# Patient Record
Sex: MALE | Race: WHITE | NOT HISPANIC OR LATINO | Employment: OTHER | URBAN - METROPOLITAN AREA
[De-identification: names, ages, dates, MRNs, and addresses within clinical notes are randomized per-mention and may not be internally consistent; named-entity substitution may affect disease eponyms.]

---

## 2020-05-20 ENCOUNTER — HOSPITAL ENCOUNTER (INPATIENT)
Facility: HOSPITAL | Age: 60
LOS: 9 days | Discharge: HOME | DRG: 280 | End: 2020-05-29
Attending: EMERGENCY MEDICINE | Admitting: INTERNAL MEDICINE
Payer: MEDICAID

## 2020-05-20 DIAGNOSIS — I50.9 CONGESTIVE HEART FAILURE, UNSPECIFIED HF CHRONICITY, UNSPECIFIED HEART FAILURE TYPE: ICD-10-CM

## 2020-05-20 DIAGNOSIS — I47.10 SVT (SUPRAVENTRICULAR TACHYCARDIA): ICD-10-CM

## 2020-05-20 DIAGNOSIS — F41.9 ANXIETY: ICD-10-CM

## 2020-05-20 DIAGNOSIS — I21.4 NSTEMI (NON-ST ELEVATED MYOCARDIAL INFARCTION): ICD-10-CM

## 2020-05-20 DIAGNOSIS — R00.0 TACHYCARDIA: ICD-10-CM

## 2020-05-20 DIAGNOSIS — R06.03 RESPIRATORY DISTRESS, ACUTE: Primary | ICD-10-CM

## 2020-05-20 DIAGNOSIS — I50.9 CHF (CONGESTIVE HEART FAILURE): ICD-10-CM

## 2020-05-20 PROBLEM — I50.41 ACUTE COMBINED SYSTOLIC AND DIASTOLIC CHF, NYHA CLASS 4: Status: ACTIVE | Noted: 2020-05-20

## 2020-05-20 PROBLEM — J44.9 COPD (CHRONIC OBSTRUCTIVE PULMONARY DISEASE): Status: ACTIVE | Noted: 2020-05-20

## 2020-05-20 PROBLEM — R79.89 ELEVATED TROPONIN: Status: ACTIVE | Noted: 2020-05-20

## 2020-05-20 PROBLEM — E87.20 LACTIC ACIDOSIS: Status: ACTIVE | Noted: 2020-05-20

## 2020-05-20 PROBLEM — R94.31 ABNORMAL ECG: Status: ACTIVE | Noted: 2020-05-20

## 2020-05-20 PROBLEM — I10 ESSENTIAL HYPERTENSION: Status: ACTIVE | Noted: 2020-05-20

## 2020-05-20 PROBLEM — J96.00 RESPIRATORY FAILURE, ACUTE: Status: ACTIVE | Noted: 2020-05-20

## 2020-05-20 LAB
ALBUMIN SERPL BCP-MCNC: 3.5 G/DL (ref 3.5–5.2)
ALLENS TEST: ABNORMAL
ALP SERPL-CCNC: 171 U/L (ref 55–135)
ALT SERPL W/O P-5'-P-CCNC: 10 U/L (ref 10–44)
ANION GAP SERPL CALC-SCNC: 18 MMOL/L (ref 8–16)
AORTIC ROOT ANNULUS: 3.6 CM
APTT BLDCRRT: 27.6 SEC (ref 21–32)
ASCENDING AORTA: 2.81 CM
AST SERPL-CCNC: 14 U/L (ref 10–40)
AV INDEX (PROSTH): 0.72
AV MEAN GRADIENT: 2 MMHG
AV PEAK GRADIENT: 3 MMHG
AV VALVE AREA: 2.41 CM2
AV VELOCITY RATIO: 0.67
B-OH-BUTYR BLD STRIP-SCNC: 0.5 MMOL/L (ref 0–0.5)
BASOPHILS # BLD AUTO: 0.02 K/UL (ref 0–0.2)
BASOPHILS # BLD AUTO: 0.15 K/UL (ref 0–0.2)
BASOPHILS NFR BLD: 0.1 % (ref 0–1.9)
BASOPHILS NFR BLD: 1.1 % (ref 0–1.9)
BILIRUB SERPL-MCNC: 0.4 MG/DL (ref 0.1–1)
BNP SERPL-MCNC: 1197 PG/ML (ref 0–99)
BSA FOR ECHO PROCEDURE: 2.09 M2
BUN SERPL-MCNC: 20 MG/DL (ref 8–23)
CALCIUM SERPL-MCNC: 9 MG/DL (ref 8.7–10.5)
CHLORIDE SERPL-SCNC: 107 MMOL/L (ref 95–110)
CK SERPL-CCNC: 35 U/L (ref 20–200)
CO2 SERPL-SCNC: 16 MMOL/L (ref 23–29)
CREAT SERPL-MCNC: 2.1 MG/DL (ref 0.5–1.4)
CRP SERPL-MCNC: 42.9 MG/L (ref 0–8.2)
CV ECHO LV RWT: 0.78 CM
DELSYS: ABNORMAL
DIFFERENTIAL METHOD: ABNORMAL
DIFFERENTIAL METHOD: ABNORMAL
DOP CALC AO PEAK VEL: 0.9 M/S
DOP CALC AO VTI: 14.9 CM
DOP CALC LVOT AREA: 3.3 CM2
DOP CALC LVOT DIAMETER: 2.06 CM
DOP CALC LVOT PEAK VEL: 0.6 M/S
DOP CALC LVOT STROKE VOLUME: 35.84 CM3
DOP CALCLVOT PEAK VEL VTI: 10.76 CM
E WAVE DECELERATION TIME: 146.42 MSEC
E/A RATIO: 3.35
E/E' RATIO: 15.82 M/S
ECHO LV POSTERIOR WALL: 1.74 CM (ref 0.6–1.1)
EOSINOPHIL # BLD AUTO: 0 K/UL (ref 0–0.5)
EOSINOPHIL # BLD AUTO: 0.3 K/UL (ref 0–0.5)
EOSINOPHIL NFR BLD: 0 % (ref 0–8)
EOSINOPHIL NFR BLD: 2.4 % (ref 0–8)
EP: 6
ERYTHROCYTE [DISTWIDTH] IN BLOOD BY AUTOMATED COUNT: 17.5 % (ref 11.5–14.5)
ERYTHROCYTE [DISTWIDTH] IN BLOOD BY AUTOMATED COUNT: 18.4 % (ref 11.5–14.5)
ERYTHROCYTE [SEDIMENTATION RATE] IN BLOOD BY WESTERGREN METHOD: 14 MM/H
EST. GFR  (AFRICAN AMERICAN): 38 ML/MIN/1.73 M^2
EST. GFR  (NON AFRICAN AMERICAN): 33 ML/MIN/1.73 M^2
FERRITIN SERPL-MCNC: 134 NG/ML (ref 20–300)
FIO2: 60
FRACTIONAL SHORTENING: 14 % (ref 28–44)
GLUCOSE SERPL-MCNC: 291 MG/DL (ref 70–110)
HCO3 UR-SCNC: 17.6 MMOL/L (ref 24–28)
HCT VFR BLD AUTO: 40.7 % (ref 40–54)
HCT VFR BLD AUTO: 46.7 % (ref 40–54)
HGB BLD-MCNC: 14.2 G/DL (ref 14–18)
HGB BLD-MCNC: 15.5 G/DL (ref 14–18)
IMM GRANULOCYTES # BLD AUTO: 0.16 K/UL (ref 0–0.04)
IMM GRANULOCYTES # BLD AUTO: 0.28 K/UL (ref 0–0.04)
IMM GRANULOCYTES NFR BLD AUTO: 1.2 % (ref 0–0.5)
IMM GRANULOCYTES NFR BLD AUTO: 2 % (ref 0–0.5)
INR PPP: 0.9 (ref 0.8–1.2)
INTERVENTRICULAR SEPTUM: 1.64 CM (ref 0.6–1.1)
IP: 12
IVRT: 51.38 MSEC
LA MAJOR: 3.42 CM
LA MINOR: 2.79 CM
LA WIDTH: 3.73 CM
LACTATE SERPL-SCNC: 2.3 MMOL/L (ref 0.5–2.2)
LACTATE SERPL-SCNC: 6.3 MMOL/L (ref 0.5–2.2)
LDH SERPL L TO P-CCNC: 285 U/L (ref 110–260)
LEFT ATRIUM SIZE: 3.46 CM
LEFT ATRIUM VOLUME INDEX: 16.4 ML/M2
LEFT ATRIUM VOLUME: 33.71 CM3
LEFT INTERNAL DIMENSION IN SYSTOLE: 3.85 CM (ref 2.1–4)
LEFT VENTRICLE DIASTOLIC VOLUME INDEX: 44.92 ML/M2
LEFT VENTRICLE DIASTOLIC VOLUME: 92.19 ML
LEFT VENTRICLE MASS INDEX: 161 G/M2
LEFT VENTRICLE SYSTOLIC VOLUME INDEX: 31.2 ML/M2
LEFT VENTRICLE SYSTOLIC VOLUME: 63.94 ML
LEFT VENTRICULAR INTERNAL DIMENSION IN DIASTOLE: 4.49 CM (ref 3.5–6)
LEFT VENTRICULAR MASS: 330.84 G
LV LATERAL E/E' RATIO: 12.43 M/S
LV SEPTAL E/E' RATIO: 21.75 M/S
LYMPHOCYTES # BLD AUTO: 1 K/UL (ref 1–4.8)
LYMPHOCYTES # BLD AUTO: 3.8 K/UL (ref 1–4.8)
LYMPHOCYTES NFR BLD: 27.1 % (ref 18–48)
LYMPHOCYTES NFR BLD: 7 % (ref 18–48)
MAGNESIUM SERPL-MCNC: 1.9 MG/DL (ref 1.6–2.6)
MCH RBC QN AUTO: 32.3 PG (ref 27–31)
MCH RBC QN AUTO: 32.5 PG (ref 27–31)
MCHC RBC AUTO-ENTMCNC: 33.2 G/DL (ref 32–36)
MCHC RBC AUTO-ENTMCNC: 34.9 G/DL (ref 32–36)
MCV RBC AUTO: 93 FL (ref 82–98)
MCV RBC AUTO: 97 FL (ref 82–98)
MODE: ABNORMAL
MONOCYTES # BLD AUTO: 0.3 K/UL (ref 0.3–1)
MONOCYTES # BLD AUTO: 1 K/UL (ref 0.3–1)
MONOCYTES NFR BLD: 2.2 % (ref 4–15)
MONOCYTES NFR BLD: 7 % (ref 4–15)
MV PEAK A VEL: 0.26 M/S
MV PEAK E VEL: 0.87 M/S
NEUTROPHILS # BLD AUTO: 12.4 K/UL (ref 1.8–7.7)
NEUTROPHILS # BLD AUTO: 8.4 K/UL (ref 1.8–7.7)
NEUTROPHILS NFR BLD: 60.4 % (ref 38–73)
NEUTROPHILS NFR BLD: 89.5 % (ref 38–73)
NRBC BLD-RTO: 0 /100 WBC
NRBC BLD-RTO: 0 /100 WBC
PCO2 BLDA: 46.7 MMHG (ref 35–45)
PH SMN: 7.18 [PH] (ref 7.35–7.45)
PHOSPHATE SERPL-MCNC: 4 MG/DL (ref 2.7–4.5)
PISA TR MAX VEL: 3.01 M/S
PLATELET # BLD AUTO: 182 K/UL (ref 150–350)
PLATELET # BLD AUTO: 228 K/UL (ref 150–350)
PMV BLD AUTO: 11.9 FL (ref 9.2–12.9)
PMV BLD AUTO: 12.9 FL (ref 9.2–12.9)
PO2 BLDA: 82 MMHG (ref 80–100)
POC BE: -11 MMOL/L
POC SATURATED O2: 93 % (ref 95–100)
POTASSIUM SERPL-SCNC: 4.4 MMOL/L (ref 3.5–5.1)
PROCALCITONIN SERPL IA-MCNC: 0.12 NG/ML
PROT SERPL-MCNC: 7.8 G/DL (ref 6–8.4)
PROTHROMBIN TIME: 9.8 SEC (ref 9–12.5)
RA MAJOR: 3.05 CM
RBC # BLD AUTO: 4.37 M/UL (ref 4.6–6.2)
RBC # BLD AUTO: 4.8 M/UL (ref 4.6–6.2)
SAMPLE: ABNORMAL
SARS-COV-2 RDRP RESP QL NAA+PROBE: NEGATIVE
SINUS: 3.29 CM
SITE: ABNORMAL
SODIUM SERPL-SCNC: 141 MMOL/L (ref 136–145)
STJ: 3 CM
TDI LATERAL: 0.07 M/S
TDI SEPTAL: 0.04 M/S
TDI: 0.06 M/S
TR MAX PG: 36 MMHG
TRICUSPID ANNULAR PLANE SYSTOLIC EXCURSION: 1.39 CM
TROPONIN I SERPL DL<=0.01 NG/ML-MCNC: 0.09 NG/ML (ref 0–0.03)
TROPONIN I SERPL DL<=0.01 NG/ML-MCNC: 1.18 NG/ML (ref 0–0.03)
TROPONIN I SERPL DL<=0.01 NG/ML-MCNC: 2.69 NG/ML (ref 0–0.03)
WBC # BLD AUTO: 13.8 K/UL (ref 3.9–12.7)
WBC # BLD AUTO: 13.83 K/UL (ref 3.9–12.7)

## 2020-05-20 PROCEDURE — 85025 COMPLETE CBC W/AUTO DIFF WBC: CPT

## 2020-05-20 PROCEDURE — 80053 COMPREHEN METABOLIC PANEL: CPT

## 2020-05-20 PROCEDURE — 85730 THROMBOPLASTIN TIME PARTIAL: CPT

## 2020-05-20 PROCEDURE — 83605 ASSAY OF LACTIC ACID: CPT

## 2020-05-20 PROCEDURE — 93005 ELECTROCARDIOGRAM TRACING: CPT

## 2020-05-20 PROCEDURE — 25000003 PHARM REV CODE 250: Performed by: EMERGENCY MEDICINE

## 2020-05-20 PROCEDURE — 99900035 HC TECH TIME PER 15 MIN (STAT)

## 2020-05-20 PROCEDURE — 63600175 PHARM REV CODE 636 W HCPCS: Performed by: EMERGENCY MEDICINE

## 2020-05-20 PROCEDURE — 96375 TX/PRO/DX INJ NEW DRUG ADDON: CPT

## 2020-05-20 PROCEDURE — 83615 LACTATE (LD) (LDH) ENZYME: CPT

## 2020-05-20 PROCEDURE — U0002 COVID-19 LAB TEST NON-CDC: HCPCS

## 2020-05-20 PROCEDURE — 99233 SBSQ HOSP IP/OBS HIGH 50: CPT | Mod: 25,,, | Performed by: INTERNAL MEDICINE

## 2020-05-20 PROCEDURE — 82550 ASSAY OF CK (CPK): CPT

## 2020-05-20 PROCEDURE — 27000190 HC CPAP FULL FACE MASK W/VALVE

## 2020-05-20 PROCEDURE — 83735 ASSAY OF MAGNESIUM: CPT

## 2020-05-20 PROCEDURE — 83880 ASSAY OF NATRIURETIC PEPTIDE: CPT

## 2020-05-20 PROCEDURE — 82728 ASSAY OF FERRITIN: CPT

## 2020-05-20 PROCEDURE — 84100 ASSAY OF PHOSPHORUS: CPT

## 2020-05-20 PROCEDURE — 94660 CPAP INITIATION&MGMT: CPT

## 2020-05-20 PROCEDURE — 86140 C-REACTIVE PROTEIN: CPT

## 2020-05-20 PROCEDURE — 63600175 PHARM REV CODE 636 W HCPCS: Performed by: INTERNAL MEDICINE

## 2020-05-20 PROCEDURE — 84484 ASSAY OF TROPONIN QUANT: CPT | Mod: 91

## 2020-05-20 PROCEDURE — 99233 PR SUBSEQUENT HOSPITAL CARE,LEVL III: ICD-10-PCS | Mod: 25,,, | Performed by: INTERNAL MEDICINE

## 2020-05-20 PROCEDURE — 85610 PROTHROMBIN TIME: CPT

## 2020-05-20 PROCEDURE — 36600 WITHDRAWAL OF ARTERIAL BLOOD: CPT

## 2020-05-20 PROCEDURE — 99291 CRITICAL CARE FIRST HOUR: CPT | Mod: 25

## 2020-05-20 PROCEDURE — 82803 BLOOD GASES ANY COMBINATION: CPT

## 2020-05-20 PROCEDURE — 84145 PROCALCITONIN (PCT): CPT

## 2020-05-20 PROCEDURE — 93010 EKG 12-LEAD: ICD-10-PCS | Mod: ,,, | Performed by: INTERNAL MEDICINE

## 2020-05-20 PROCEDURE — 93010 ELECTROCARDIOGRAM REPORT: CPT | Mod: ,,, | Performed by: INTERNAL MEDICINE

## 2020-05-20 PROCEDURE — 87040 BLOOD CULTURE FOR BACTERIA: CPT

## 2020-05-20 PROCEDURE — 84484 ASSAY OF TROPONIN QUANT: CPT

## 2020-05-20 PROCEDURE — 96365 THER/PROPH/DIAG IV INF INIT: CPT

## 2020-05-20 PROCEDURE — 82010 KETONE BODYS QUAN: CPT

## 2020-05-20 PROCEDURE — 83605 ASSAY OF LACTIC ACID: CPT | Mod: 91

## 2020-05-20 PROCEDURE — 11000001 HC ACUTE MED/SURG PRIVATE ROOM

## 2020-05-20 RX ORDER — ACETAMINOPHEN 325 MG/1
650 TABLET ORAL EVERY 4 HOURS PRN
Status: DISCONTINUED | OUTPATIENT
Start: 2020-05-20 | End: 2020-05-29 | Stop reason: HOSPADM

## 2020-05-20 RX ORDER — ASPIRIN 325 MG
325 TABLET ORAL
Status: COMPLETED | OUTPATIENT
Start: 2020-05-20 | End: 2020-05-20

## 2020-05-20 RX ORDER — LABETALOL HYDROCHLORIDE 5 MG/ML
20 INJECTION, SOLUTION INTRAVENOUS
Status: COMPLETED | OUTPATIENT
Start: 2020-05-20 | End: 2020-05-20

## 2020-05-20 RX ORDER — ADENOSINE 3 MG/ML
6 INJECTION INTRAVENOUS
Status: DISCONTINUED | OUTPATIENT
Start: 2020-05-20 | End: 2020-05-20

## 2020-05-20 RX ORDER — METHYLPREDNISOLONE SOD SUCC 125 MG
125 VIAL (EA) INJECTION
Status: COMPLETED | OUTPATIENT
Start: 2020-05-20 | End: 2020-05-20

## 2020-05-20 RX ORDER — FUROSEMIDE 10 MG/ML
40 INJECTION INTRAMUSCULAR; INTRAVENOUS
Status: DISCONTINUED | OUTPATIENT
Start: 2020-05-20 | End: 2020-05-23

## 2020-05-20 RX ORDER — SODIUM CHLORIDE 0.9 % (FLUSH) 0.9 %
10 SYRINGE (ML) INJECTION
Status: DISCONTINUED | OUTPATIENT
Start: 2020-05-20 | End: 2020-05-29 | Stop reason: HOSPADM

## 2020-05-20 RX ORDER — HEPARIN SODIUM,PORCINE/D5W 25000/250
12 INTRAVENOUS SOLUTION INTRAVENOUS CONTINUOUS
Status: DISCONTINUED | OUTPATIENT
Start: 2020-05-20 | End: 2020-05-25

## 2020-05-20 RX ORDER — ADENOSINE 3 MG/ML
INJECTION, SOLUTION INTRAVENOUS
Status: DISPENSED
Start: 2020-05-20 | End: 2020-05-20

## 2020-05-20 RX ORDER — ADENOSINE 3 MG/ML
6 INJECTION INTRAVENOUS
Status: COMPLETED | OUTPATIENT
Start: 2020-05-20 | End: 2020-05-20

## 2020-05-20 RX ORDER — METOPROLOL TARTRATE 1 MG/ML
5 INJECTION, SOLUTION INTRAVENOUS
Status: COMPLETED | OUTPATIENT
Start: 2020-05-20 | End: 2020-05-20

## 2020-05-20 RX ORDER — FUROSEMIDE 10 MG/ML
40 INJECTION INTRAMUSCULAR; INTRAVENOUS
Status: COMPLETED | OUTPATIENT
Start: 2020-05-20 | End: 2020-05-20

## 2020-05-20 RX ORDER — IPRATROPIUM BROMIDE AND ALBUTEROL SULFATE 2.5; .5 MG/3ML; MG/3ML
3 SOLUTION RESPIRATORY (INHALATION) EVERY 6 HOURS PRN
Status: DISCONTINUED | OUTPATIENT
Start: 2020-05-21 | End: 2020-05-21

## 2020-05-20 RX ORDER — ADENOSINE 3 MG/ML
12 INJECTION, SOLUTION INTRAVENOUS
Status: COMPLETED | OUTPATIENT
Start: 2020-05-20 | End: 2020-05-20

## 2020-05-20 RX ORDER — IPRATROPIUM BROMIDE AND ALBUTEROL SULFATE 2.5; .5 MG/3ML; MG/3ML
3 SOLUTION RESPIRATORY (INHALATION)
Status: DISCONTINUED | OUTPATIENT
Start: 2020-05-20 | End: 2020-05-20

## 2020-05-20 RX ADMIN — NITROGLYCERIN 1 INCH: 20 OINTMENT TOPICAL at 09:05

## 2020-05-20 RX ADMIN — ADENOSINE 12 MG: 3 INJECTION, SOLUTION INTRAVENOUS at 08:05

## 2020-05-20 RX ADMIN — ASPIRIN 325 MG: 325 TABLET, FILM COATED ORAL at 09:05

## 2020-05-20 RX ADMIN — HEPARIN SODIUM 12 UNITS/KG/HR: 10000 INJECTION, SOLUTION INTRAVENOUS at 10:05

## 2020-05-20 RX ADMIN — LABETALOL HYDROCHLORIDE 20 MG: 5 INJECTION, SOLUTION INTRAVENOUS at 10:05

## 2020-05-20 RX ADMIN — FUROSEMIDE 40 MG: 10 INJECTION, SOLUTION INTRAMUSCULAR; INTRAVENOUS at 10:05

## 2020-05-20 RX ADMIN — ADENOSINE 6 MG: 3 INJECTION INTRAVENOUS at 08:05

## 2020-05-20 RX ADMIN — FUROSEMIDE 40 MG: 10 INJECTION, SOLUTION INTRAMUSCULAR; INTRAVENOUS at 09:05

## 2020-05-20 RX ADMIN — METOPROLOL TARTRATE 5 MG: 5 INJECTION INTRAVENOUS at 08:05

## 2020-05-20 RX ADMIN — CEFTRIAXONE 1 G: 1 INJECTION, SOLUTION INTRAVENOUS at 09:05

## 2020-05-20 RX ADMIN — METHYLPREDNISOLONE SODIUM SUCCINATE 125 MG: 125 INJECTION, POWDER, FOR SOLUTION INTRAMUSCULAR; INTRAVENOUS at 08:05

## 2020-05-20 NOTE — PROGRESS NOTES
Rt CALLED BACK TO RM. PT STATED HE COULDN'T BREATHE.  POST BEING SAT UP IN SEMI MARTÍNEZ POSITION.. Rt LAID PT BACK AND CHANGED PT BACK TO 12/6 AND INCREASED FIO2 60% SATS WERE 88% NOW 92%.

## 2020-05-20 NOTE — Clinical Note
Catheter is inserted into the ostium   right coronary artery. Angiography performed of the right coronary arteries in multiple views.All catheter exchanges occurs over the wire.

## 2020-05-20 NOTE — Clinical Note
Prepped: left radial and left brachial. Prepped with: ChloraPrep. The site was clipped. The patient was draped.

## 2020-05-20 NOTE — ED NOTES
The patient is resting quietly, eyes closed, arouses easily to stimuli. Airway is open and patent, respirations are spontaneous, normal respiratory effort and rate noted with bipap in place, skin warm and dry, appearance: in no acute distress and resting comfortably.

## 2020-05-20 NOTE — ED NOTES
Patient's sister, Lilian Hernandez ([phone]), called to offer information on the patient and would like an update. Primary nurse notified.

## 2020-05-20 NOTE — H&P (VIEW-ONLY)
[facility] -   Cardiology  Consult Note    Patient Name: Ceasar Hernandez  MRN: [id number]  Admission Date: 5/20/2020  Hospital Length of Stay: 0 days  Code Status: No Order   Attending Provider: No att. providers found   Consulting Provider: Rom Hall MD  Primary Care Physician: Provider Not[employer]ystem  Principal Problem:<principal problem not specified>    Patient information was obtained from past medical records and ER records.     Inpatient consult to Cardiology  Consult performed by: Rom Hall MD  Consult ordered by: Davis Lugo MD  Reason for consult: CHF        Subjective:     Chief Complaint:  DYSPNEA     HPI:   Pt presents with acute respiratory failure.  Per ER chart, suspicious for Covid 19.  Cardiology consult performed based on communication from ER physician and chart review.  He has h/o COPD.  Pt presented to ER with c/o dyspnea since yesterday with associated wheezing.  Per chart no cp sxs.  ECG in ER showed possible SVT.  Adenosine administered and f/u ecgs looked more like sinus tachycardia.  BP very high on arrival 217/109.  Given Labetalol in ER.    BNP 1194  Troponin 0.09  Lactic acid +  ecg with inferolateral st-t abnl.  No old records to compare/review.    Past Medical History:   Diagnosis Date    COPD (chronic obstructive pulmonary disease)        Past Surgical History:   Procedure Laterality Date    FRACTURE SURGERY         Review of patient's allergies indicates:  No Known Allergies    No current facility-administered medications on file prior to encounter.      No current outpatient medications on file prior to encounter.     Family History     None        Tobacco Use    Smoking status: Former Smoker   Substance and Sexual Activity    Alcohol use: Not Currently    Drug use: Never    Sexual activity: Not on file     Review of Systems   Constitution: Negative.   HENT: Negative.    Eyes: Negative.    Cardiovascular: Positive for dyspnea on exertion.    Respiratory: Positive for shortness of breath and wheezing.    Endocrine: Negative.    Hematologic/Lymphatic: Negative.    Skin: Negative.    Musculoskeletal: Negative.    Gastrointestinal: Negative.    Genitourinary: Negative.    Neurological: Negative.    Psychiatric/Behavioral: Negative.    Allergic/Immunologic: Negative.      Objective:     Vital Signs (Most Recent):  Temp: 98.7 °F (37.1 °C) (05/20/20 1502)  Pulse: 97 (05/20/20 1531)  Resp: (!) 24 (05/20/20 1531)  BP: (!) 139/95 (05/20/20 1531)  SpO2: 97 % (05/20/20 1531) Vital Signs (24h Range):  Temp:  [98.5 °F (36.9 °C)-98.7 °F (37.1 °C)] 98.7 °F (37.1 °C)  Pulse:  [] 97  Resp:  [21-44] 24  SpO2:  [90 %-98 %] 97 %  BP: (126-217)/() 139/95     Weight: 89.4 kg (197 lb)  Body mass index is 29.09 kg/m².    SpO2: 97 %  O2 Device (Oxygen Therapy): BiPAP      Intake/Output Summary (Last 24 hours) at 5/20/2020 1550  Last data filed at 5/20/2020 0944  Gross per 24 hour   Intake 50 ml   Output --   Net 50 ml       Lines/Drains/Airways     Peripheral Intravenous Line                 Peripheral IV - Single Lumen 05/20/20 0810 20 G Left Antecubital less than 1 day                Physical Exam     Not examined due to possible Covid 19 infection.      Significant Labs:   BMP:   Recent Labs   Lab 05/20/20  0811   *      K 4.4      CO2 16*   BUN 20   CREATININE 2.1*   CALCIUM 9.0   , CMP   Recent Labs   Lab 05/20/20  0811      K 4.4      CO2 16*   *   BUN 20   CREATININE 2.1*   CALCIUM 9.0   PROT 7.8   ALBUMIN 3.5   BILITOT 0.4   ALKPHOS 171*   AST 14   ALT 10   ANIONGAP 18*   ESTGFRAFRICA 38*   EGFRNONAA 33*   , CBC   Recent Labs   Lab 05/20/20  0811   WBC 13.83*   HGB 15.5   HCT 46.7      , INR No results for input(s): INR, PROTIME in the last 48 hours. and Troponin   Recent Labs   Lab 05/20/20  0811   TROPONINI 0.092*       Significant Imaging: Echocardiogram:   Transthoracic echo (TTE) complete (Cupid Only):    Results for orders placed or performed during the hospital encounter of 05/20/20   Echo Color Flow Doppler? Yes   Result Value Ref Range    Ascending aorta 2.81 cm    STJ 3.00 cm    AV mean gradient 2 mmHg    Ao peak papi 0.90 m/s    Ao VTI 14.90 cm    IVRT 51.38 msec    IVS 1.64 (A) 0.6 - 1.1 cm    LA size 3.46 cm    Left Atrium Major Axis 3.42 cm    Left Atrium Minor Axis 2.79 cm    LVIDD 4.49 3.5 - 6.0 cm    LVIDS 3.85 2.1 - 4.0 cm    LVOT diameter 2.06 cm    LVOT peak VTI 10.76 cm    PW 1.74 (A) 0.6 - 1.1 cm    MV Peak A Papi 0.26 m/s    E wave decelartion time 146.42 msec    MV Peak E Papi 0.87 m/s    RA Major Axis 3.05 cm    Sinus 3.29 cm    TAPSE 1.39 cm    TR Max Papi 3.01 m/s    TDI LATERAL 0.07 m/s    TDI SEPTAL 0.04 m/s    LA WIDTH 3.73 cm    Ao root annulus 3.60 cm    LV Diastolic Volume 92.19 mL    LV Systolic Volume 63.94 mL    LVOT peak papi 0.60 m/s    LV LATERAL E/E' RATIO 12.43 m/s    LV SEPTAL E/E' RATIO 21.75 m/s    FS 14 %    LA volume 33.71 cm3    LV mass 330.84 g    Left Ventricle Relative Wall Thickness 0.78 cm    AV valve area 2.41 cm2    AV Velocity Ratio 0.67     AV index (prosthetic) 0.72     E/A ratio 3.35     Mean e' 0.06 m/s    LVOT area 3.3 cm2    LVOT stroke volume 35.84 cm3    AV peak gradient 3 mmHg    E/E' ratio 15.82 m/s    LV Systolic Volume Index 31.2 mL/m2    LV Diastolic Volume Index 44.92 mL/m2    LA Volume Index 16.4 mL/m2    LV Mass Index 161 g/m2    Triscuspid Valve Regurgitation Peak Gradient 36 mmHg    BSA 2.09 m2    Narrative    · Severely decreased left ventricular systolic function. The estimated   ejection fraction is 20%.  · Grade III (severe) left ventricular diastolic dysfunction consistent   with restrictive physiology.  · Normal right ventricular systolic function.  · Concentric left ventricular hypertrophy.        Assessment and Plan:     PT HAS ACUTE CHF EXACERBATION WITH ELEVATED BNP AND ECHOCARDIOGRAM TODAY SHOWS LVEF 20%, DIASTOLIC CHF.  NORMAL RV  FUNCTION.  ELEVATED TROPONIN COULD BE SUPPLY/DEMAND ISCHEMIA WITH CHF, POSSIBLE SEPSIS OR ? COVID 19.  CANNOT RULE OUT UNDERLYING A FLUTTER ON INITIAL ECG BUT MOST RECENT APPEARS MORE SINUS TACHYCARDIA.  NEEDS ADMISSION TO ICU.  SERIAL CARDIAC ENZYMES.  RECOMMEND STARTING IV HEPARIN GTT (IF NO CONTRAINDICATIONS) FOR TIME BEING UNTIL TROPONIN LEVELS FURTHER DELINEATED.  IV LASIX 40 MG BID.  HTN CONTROL.  RECHECK LACTIC ACID.  TREAT FOR POSSIBLE SEPSIS.  POSSIBLE COVID ?  CONSIDER LOW DOSE DOBUTAMINE FOR INOTROPE SUPPORT IF FAILS TO IMPROVE BUT USE CAUTION WITH ELEVATED HR AND HTN ISSUES.  WILL FOLLOW.        Lactic acidosis  See management plan detailed above.     COPD (chronic obstructive pulmonary disease)  Per hospital medicine, critical care mgt.    Essential hypertension  HTN control.    Elevated troponin  See management plan detailed above.     Abnormal ECG  See management plan detailed above.       Acute combined systolic and diastolic CHF, NYHA class 4  See management plan detailed above.     Respiratory failure, acute  Per hospital medicine, critical care mgt.      VTE Risk Mitigation (From admission, onward)    None          Thank you for your consult.      Rom Hall MD  Cardiology   [facility] -

## 2020-05-20 NOTE — PROGRESS NOTES
RT called to ER . PT sob. Arrived via ambulance. PT on NRB at 15 lpm.  Ordered 3 nebs. .  pt. In SVT ok for RT to hold per physician,. BIPAP placed on PT 12/6 and 60 % . Post 5mins AGS drawn Results shown to Dr. Lugo.  RT than decreased fio2 50% and I- 10 and rate to 12.  RT educated pt on Indication and goals on all the above.

## 2020-05-20 NOTE — ED NOTES
"Pt lying in bed with bipap in place. AAO x 4. Skin warm and dry. Pt still slightly tachypneic but states "I feel a little better". Denies any needs or assist at this time.     "

## 2020-05-20 NOTE — Clinical Note
The catheter is removed from the  distal suprarenal abdominal aorta. All catheter exchanges occurs over the wire. .

## 2020-05-20 NOTE — CONSULTS
[facility] -   Cardiology  Consult Note    Patient Name: Ceasar Hernandez  MRN: [id number]  Admission Date: 5/20/2020  Hospital Length of Stay: 0 days  Code Status: No Order   Attending Provider: No att. providers found   Consulting Provider: Rom Hall MD  Primary Care Physician: Provider [employer]  Principal Problem:<principal problem not specified>    Patient information was obtained from past medical records and ER records.     Inpatient consult to Cardiology  Consult performed by: Rom Hall MD  Consult ordered by: Davis Lugo MD  Reason for consult: CHF        Subjective:     Chief Complaint:  DYSPNEA     HPI:   Pt presents with acute respiratory failure.  Per ER chart, suspicious for Covid 19.  Cardiology consult performed based on communication from ER physician and chart review.  He has h/o COPD.  Pt presented to ER with c/o dyspnea since yesterday with associated wheezing.  Per chart no cp sxs.  ECG in ER showed possible SVT.  Adenosine administered and f/u ecgs looked more like sinus tachycardia.  BP very high on arrival 217/109.  Given Labetalol in ER.    BNP 1194  Troponin 0.09  Lactic acid +  ecg with inferolateral st-t abnl.  No old records to compare/review.    Past Medical History:   Diagnosis Date    COPD (chronic obstructive pulmonary disease)        Past Surgical History:   Procedure Laterality Date    FRACTURE SURGERY         Review of patient's allergies indicates:  No Known Allergies    No current facility-administered medications on file prior to encounter.      No current outpatient medications on file prior to encounter.     Family History     None        Tobacco Use    Smoking status: Former Smoker   Substance and Sexual Activity    Alcohol use: Not Currently    Drug use: Never    Sexual activity: Not on file     Review of Systems   Constitution: Negative.   HENT: Negative.    Eyes: Negative.    Cardiovascular: Positive for dyspnea on exertion.    Respiratory: Positive for shortness of breath and wheezing.    Endocrine: Negative.    Hematologic/Lymphatic: Negative.    Skin: Negative.    Musculoskeletal: Negative.    Gastrointestinal: Negative.    Genitourinary: Negative.    Neurological: Negative.    Psychiatric/Behavioral: Negative.    Allergic/Immunologic: Negative.      Objective:     Vital Signs (Most Recent):  Temp: 98.7 °F (37.1 °C) (05/20/20 1502)  Pulse: 97 (05/20/20 1531)  Resp: (!) 24 (05/20/20 1531)  BP: (!) 139/95 (05/20/20 1531)  SpO2: 97 % (05/20/20 1531) Vital Signs (24h Range):  Temp:  [98.5 °F (36.9 °C)-98.7 °F (37.1 °C)] 98.7 °F (37.1 °C)  Pulse:  [] 97  Resp:  [21-44] 24  SpO2:  [90 %-98 %] 97 %  BP: (126-217)/() 139/95     Weight: 89.4 kg (197 lb)  Body mass index is 29.09 kg/m².    SpO2: 97 %  O2 Device (Oxygen Therapy): BiPAP      Intake/Output Summary (Last 24 hours) at 5/20/2020 1550  Last data filed at 5/20/2020 0944  Gross per 24 hour   Intake 50 ml   Output --   Net 50 ml       Lines/Drains/Airways     Peripheral Intravenous Line                 Peripheral IV - Single Lumen 05/20/20 0810 20 G Left Antecubital less than 1 day                Physical Exam     Not examined due to possible Covid 19 infection.      Significant Labs:   BMP:   Recent Labs   Lab 05/20/20  0811   *      K 4.4      CO2 16*   BUN 20   CREATININE 2.1*   CALCIUM 9.0   , CMP   Recent Labs   Lab 05/20/20  0811      K 4.4      CO2 16*   *   BUN 20   CREATININE 2.1*   CALCIUM 9.0   PROT 7.8   ALBUMIN 3.5   BILITOT 0.4   ALKPHOS 171*   AST 14   ALT 10   ANIONGAP 18*   ESTGFRAFRICA 38*   EGFRNONAA 33*   , CBC   Recent Labs   Lab 05/20/20  0811   WBC 13.83*   HGB 15.5   HCT 46.7      , INR No results for input(s): INR, PROTIME in the last 48 hours. and Troponin   Recent Labs   Lab 05/20/20  0811   TROPONINI 0.092*       Significant Imaging: Echocardiogram:   Transthoracic echo (TTE) complete (Cupid Only):    Results for orders placed or performed during the hospital encounter of 05/20/20   Echo Color Flow Doppler? Yes   Result Value Ref Range    Ascending aorta 2.81 cm    STJ 3.00 cm    AV mean gradient 2 mmHg    Ao peak papi 0.90 m/s    Ao VTI 14.90 cm    IVRT 51.38 msec    IVS 1.64 (A) 0.6 - 1.1 cm    LA size 3.46 cm    Left Atrium Major Axis 3.42 cm    Left Atrium Minor Axis 2.79 cm    LVIDD 4.49 3.5 - 6.0 cm    LVIDS 3.85 2.1 - 4.0 cm    LVOT diameter 2.06 cm    LVOT peak VTI 10.76 cm    PW 1.74 (A) 0.6 - 1.1 cm    MV Peak A Papi 0.26 m/s    E wave decelartion time 146.42 msec    MV Peak E Papi 0.87 m/s    RA Major Axis 3.05 cm    Sinus 3.29 cm    TAPSE 1.39 cm    TR Max Papi 3.01 m/s    TDI LATERAL 0.07 m/s    TDI SEPTAL 0.04 m/s    LA WIDTH 3.73 cm    Ao root annulus 3.60 cm    LV Diastolic Volume 92.19 mL    LV Systolic Volume 63.94 mL    LVOT peak papi 0.60 m/s    LV LATERAL E/E' RATIO 12.43 m/s    LV SEPTAL E/E' RATIO 21.75 m/s    FS 14 %    LA volume 33.71 cm3    LV mass 330.84 g    Left Ventricle Relative Wall Thickness 0.78 cm    AV valve area 2.41 cm2    AV Velocity Ratio 0.67     AV index (prosthetic) 0.72     E/A ratio 3.35     Mean e' 0.06 m/s    LVOT area 3.3 cm2    LVOT stroke volume 35.84 cm3    AV peak gradient 3 mmHg    E/E' ratio 15.82 m/s    LV Systolic Volume Index 31.2 mL/m2    LV Diastolic Volume Index 44.92 mL/m2    LA Volume Index 16.4 mL/m2    LV Mass Index 161 g/m2    Triscuspid Valve Regurgitation Peak Gradient 36 mmHg    BSA 2.09 m2    Narrative    · Severely decreased left ventricular systolic function. The estimated   ejection fraction is 20%.  · Grade III (severe) left ventricular diastolic dysfunction consistent   with restrictive physiology.  · Normal right ventricular systolic function.  · Concentric left ventricular hypertrophy.        Assessment and Plan:     PT HAS ACUTE CHF EXACERBATION WITH ELEVATED BNP AND ECHOCARDIOGRAM TODAY SHOWS LVEF 20%, DIASTOLIC CHF.  NORMAL RV  FUNCTION.  ELEVATED TROPONIN COULD BE SUPPLY/DEMAND ISCHEMIA WITH CHF, POSSIBLE SEPSIS OR ? COVID 19.  CANNOT RULE OUT UNDERLYING A FLUTTER ON INITIAL ECG BUT MOST RECENT APPEARS MORE SINUS TACHYCARDIA.  NEEDS ADMISSION TO ICU.  SERIAL CARDIAC ENZYMES.  RECOMMEND STARTING IV HEPARIN GTT (IF NO CONTRAINDICATIONS) FOR TIME BEING UNTIL TROPONIN LEVELS FURTHER DELINEATED.  IV LASIX 40 MG BID.  HTN CONTROL.  RECHECK LACTIC ACID.  TREAT FOR POSSIBLE SEPSIS.  POSSIBLE COVID ?  CONSIDER LOW DOSE DOBUTAMINE FOR INOTROPE SUPPORT IF FAILS TO IMPROVE BUT USE CAUTION WITH ELEVATED HR AND HTN ISSUES.  WILL FOLLOW.        Lactic acidosis  See management plan detailed above.     COPD (chronic obstructive pulmonary disease)  Per hospital medicine, critical care mgt.    Essential hypertension  HTN control.    Elevated troponin  See management plan detailed above.     Abnormal ECG  See management plan detailed above.       Acute combined systolic and diastolic CHF, NYHA class 4  See management plan detailed above.     Respiratory failure, acute  Per hospital medicine, critical care mgt.      VTE Risk Mitigation (From admission, onward)    None          Thank you for your consult.      Rom Hall MD  Cardiology   [facility] -

## 2020-05-20 NOTE — Clinical Note
The PA catheter is repositioned to the main pulmonary artery. Hemodynamics performed. Cardiac output obtained.

## 2020-05-20 NOTE — Clinical Note
115 ml injected throughout the case. 35 mL total wasted during the case. 150 mL total used in the case.

## 2020-05-20 NOTE — ED NOTES
Dr Lugo at bedside with Susie RN and Sharron RN to push adenosine. Pt connected to EKG to run a continuous rhythm during administration

## 2020-05-20 NOTE — ED NOTES
The patient is resting quietly, eyes closed, arouses easily to stimuli. Airway is open and patent, respirations are spontaneous with bipap in place, skin warm and dry, appearance: in no acute distress and resting comfortably.

## 2020-05-20 NOTE — ED NOTES
Pt lying in bed comfortably with bipap in place. AAO x 4, pt states he feels much better at this time.  Skin warm and dry. Resp even and unlabored with equal chest rise and fall. Denies any needs or assist at this time.

## 2020-05-20 NOTE — ED PROVIDER NOTES
SCRIBE #1 NOTE: I, Natalya Aguirer, am scribing for, and in the presence of, Davis Lugo MD. I have scribed the entire note.       History     Chief Complaint   Patient presents with    Shortness of Breath     pt presents to ED with shortness of breath      Review of patient's allergies indicates:  No Known Allergies      History of Present Illness     HPI    5/20/2020, 8:11 AM  History obtained from [facility] and patient      History of Present Illness: Ceasar Hernandez is a 60 y.o. male patient with a h/o COPD who presents to the Emergency Department for evaluation of SOB which onset yesterday. Symptoms are constant and moderate in severity. No mitigating or exacerbating factors reported. Associated sxs include wheezing. Patient denies any CP, fever, n/v, chills, and all other sxs at this time. No prior Tx reported. No further complaints or concerns at this time.       Arrival mode: [facility]    PCP: [facility]      Past Medical History:  Past Medical History:   Diagnosis Date    COPD (chronic obstructive pulmonary disease)     Gout     Hypertension        Past Surgical History:  Past Surgical History:   Procedure Laterality Date    FRACTURE SURGERY           Family History:  History reviewed. No pertinent family history.    Social History:   Social History     Tobacco Use    Smoking status: Former Smoker   Substance and Sexual Activity    Alcohol use: Not Currently    Drug use: Never    Sexual activity: Unknown        Review of Systems     Review of Systems   Constitutional: Negative for chills and fever.   HENT: Negative for sore throat.    Respiratory: Positive for shortness of breath and wheezing.    Cardiovascular: Negative for chest pain.   Gastrointestinal: Negative for nausea and vomiting.   Genitourinary: Negative for dysuria.   Musculoskeletal: Negative for back pain.   Skin: Negative for rash.   Neurological: Negative for headaches.   Hematological: Does not bruise/bleed easily.    All other systems reviewed and are negative.       Physical Exam     Initial Vitals   BP Pulse Resp Temp SpO2   05/20/20 0807 05/20/20 0805 05/20/20 0807 05/20/20 0831 05/20/20 0807   (!) 217/109 (!) 144 (!) 31 98.5 °F (36.9 °C) 95 %      MAP       --                 Physical Exam  Nursing Notes and Vital Signs Reviewed.  Constitutional: Well-developed and well-nourished.   Head: Atraumatic. Normocephalic.  Eyes: EOM intact. No scleral icterus.  ENT: Mucous membranes are moist. Oropharynx is clear and symmetric.    Neck: Supple. Full ROM. No lymphadenopathy.  Cardiovascular: Tachycardic. Regular rhythm. No murmurs, rubs, or gallops. Distal pulses are 2+ and symmetric.  Pulmonary/Chest: Mild respiratory distress. Tachypneic. Diminished breath sounds bilaterally. Wheezing. No rales.  Abdominal: Soft and non-distended.  There is no tenderness.  No rebound, guarding, or rigidity. Good bowel sounds.  Genitourinary: No CVA tenderness  Musculoskeletal: Moves all extremities. No obvious deformities. No calf tenderness.  Skin: Warm and dry.  Neurological:  Alert, awake, and appropriate.  Normal speech.  No acute focal neurological deficits are appreciated.  Psychiatric: Normal affect. Good eye contact. Appropriate in content.     ED Course   Critical Care  Date/Time: 5/20/2020 10:13 AM  Performed by: Davis Lugo MD  Authorized by: Davis Lugo MD   Direct patient critical care time: 17 minutes  Additional history critical care time: 4 minutes  Ordering / reviewing critical care time: 14 minutes  Documentation critical care time: 15 minutes  Total critical care time (exclusive of procedural time) : 50 minutes  Critical care time was exclusive of separately billable procedures and treating other patients and teaching time.  Critical care was necessary to treat or prevent imminent or life-threatening deterioration of the following conditions: respiratory failure.  Critical care was time spent  personally by me on the following activities: blood draw for specimens, development of treatment plan with patient or surrogate, discussions with consultants, interpretation of cardiac output measurements, evaluation of patient's response to treatment, examination of patient, obtaining history from patient or surrogate, ordering and performing treatments and interventions, ordering and review of laboratory studies, ordering and review of radiographic studies, pulse oximetry, re-evaluation of patient's condition and review of old charts.        ED Vital Signs:  Vitals:    05/21/20 0535 05/21/20 0550 05/21/20 0605 05/21/20 0620   BP: (!) 147/112 (!) 157/93 121/65 (!) 167/109   Pulse: 105 109 (!) 112 110   Resp: 20 19 (!) 23 (!) 22   Temp:       TempSrc:       SpO2: 99% 95% 96% 96%   Weight:       Height:        05/21/20 0635 05/21/20 0650 05/21/20 0700 05/21/20 0730   BP: (!) 168/111 (!) 161/110 (!) 174/96    Pulse: 110 109 109    Resp: 17 16 17    Temp:    98 °F (36.7 °C)   TempSrc:    Skin   SpO2: 95% 96% 95%    Weight:       Height:        05/21/20 0800 05/21/20 0828 05/21/20 0900 05/21/20 1000   BP: (!) 156/100  (!) 160/97 (!) 153/91   Pulse: 110 (!) 111 (!) 111 109   Resp: (!) 25 19 (!) 22 (!) 23   Temp:       TempSrc:       SpO2: 96% 97% 95% 96%   Weight:       Height:        05/21/20 1100 05/21/20 1200 05/21/20 1300   BP: (!) 159/92 (!) 145/101 131/83   Pulse: (!) 111 (!) 116 (!) 115   Resp: 18 (!) 23    Temp: 98.1 °F (36.7 °C)     TempSrc: Skin     SpO2: 95% 96% 97%   Weight:      Height:          Abnormal Lab Results:  Labs Reviewed   CBC W/ AUTO DIFFERENTIAL - Abnormal; Notable for the following components:       Result Value    WBC 13.83 (*)     Mean Corpuscular Hemoglobin 32.3 (*)     RDW 18.4 (*)     Immature Granulocytes 2.0 (*)     Gran # (ANC) 8.4 (*)     Immature Grans (Abs) 0.28 (*)     All other components within normal limits   COMPREHENSIVE METABOLIC PANEL - Abnormal; Notable for the following  components:    CO2 16 (*)     Glucose 291 (*)     Creatinine 2.1 (*)     Alkaline Phosphatase 171 (*)     Anion Gap 18 (*)     eGFR if  38 (*)     eGFR if non  33 (*)     All other components within normal limits   C-REACTIVE PROTEIN - Abnormal; Notable for the following components:    CRP 42.9 (*)     All other components within normal limits   LACTATE DEHYDROGENASE - Abnormal; Notable for the following components:     (*)     All other components within normal limits   LACTIC ACID, PLASMA - Abnormal; Notable for the following components:    Lactate (Lactic Acid) 6.3 (*)     All other components within normal limits    Narrative:       LACTIC ACID critical result(s) called and verbal readback obtained   from LUCY FERRARI RN by VALERIE 05/20/2020 08:52   TROPONIN I - Abnormal; Notable for the following components:    Troponin I 0.092 (*)     All other components within normal limits   B-TYPE NATRIURETIC PEPTIDE - Abnormal; Notable for the following components:    BNP 1,197 (*)     All other components within normal limits   TROPONIN I - Abnormal; Notable for the following components:    Troponin I 1.180 (*)     All other components within normal limits   CBC W/ AUTO DIFFERENTIAL - Abnormal; Notable for the following components:    WBC 13.80 (*)     RBC 4.37 (*)     Mean Corpuscular Hemoglobin 32.5 (*)     RDW 17.5 (*)     Immature Granulocytes 1.2 (*)     Gran # (ANC) 12.4 (*)     Immature Grans (Abs) 0.16 (*)     Gran% 89.5 (*)     Lymph% 7.0 (*)     Mono% 2.2 (*)     All other components within normal limits    Narrative:     (if patient is on warfarin prior to heparin therapy)   LACTIC ACID, PLASMA - Abnormal; Notable for the following components:    Lactate (Lactic Acid) 2.3 (*)     All other components within normal limits    Narrative:     (if patient is on warfarin prior to heparin therapy)   TROPONIN I - Abnormal; Notable for the following components:    Troponin I 2.686  (*)     All other components within normal limits    Narrative:     (if patient is on warfarin prior to heparin therapy)   ISTAT PROCEDURE - Abnormal; Notable for the following components:    POC PH 7.185 (*)     POC PCO2 46.7 (*)     POC HCO3 17.6 (*)     POC SATURATED O2 93 (*)     All other components within normal limits   CULTURE, BLOOD    Narrative:     Aerobic and anaerobic   CULTURE, BLOOD    Narrative:     Aerobic and anaerobic   FERRITIN   CK   SARS-COV-2 RNA AMPLIFICATION, QUAL    Narrative:     What symptom criteria does the patient meet?->Shortness of  breath or difficulty breathing   PROCALCITONIN   BETA - HYDROXYBUTYRATE, SERUM   APTT    Narrative:     (if patient is on warfarin prior to heparin therapy)   PROTIME-INR    Narrative:     (if patient is on warfarin prior to heparin therapy)   MAGNESIUM    Narrative:     (if patient is on warfarin prior to heparin therapy)   PHOSPHORUS    Narrative:     (if patient is on warfarin prior to heparin therapy)        All Lab Results:  Results for orders placed or performed during the hospital encounter of 05/20/20   Blood culture x two cultures. Draw prior to antibiotics.   Result Value Ref Range    Blood Culture, Routine No Growth to date    Blood culture x two cultures. Draw prior to antibiotics.   Result Value Ref Range    Blood Culture, Routine No Growth to date    CBC auto differential   Result Value Ref Range    WBC 13.83 (H) 3.90 - 12.70 K/uL    RBC 4.80 4.60 - 6.20 M/uL    Hemoglobin 15.5 14.0 - 18.0 g/dL    Hematocrit 46.7 40.0 - 54.0 %    Mean Corpuscular Volume 97 82 - 98 fL    Mean Corpuscular Hemoglobin 32.3 (H) 27.0 - 31.0 pg    Mean Corpuscular Hemoglobin Conc 33.2 32.0 - 36.0 g/dL    RDW 18.4 (H) 11.5 - 14.5 %    Platelets 228 150 - 350 K/uL    MPV 12.9 9.2 - 12.9 fL    Immature Granulocytes 2.0 (H) 0.0 - 0.5 %    Gran # (ANC) 8.4 (H) 1.8 - 7.7 K/uL    Immature Grans (Abs) 0.28 (H) 0.00 - 0.04 K/uL    Lymph # 3.8 1.0 - 4.8 K/uL    Mono # 1.0  0.3 - 1.0 K/uL    Eos # 0.3 0.0 - 0.5 K/uL    Baso # 0.15 0.00 - 0.20 K/uL    nRBC 0 0 /100 WBC    Gran% 60.4 38.0 - 73.0 %    Lymph% 27.1 18.0 - 48.0 %    Mono% 7.0 4.0 - 15.0 %    Eosinophil% 2.4 0.0 - 8.0 %    Basophil% 1.1 0.0 - 1.9 %    Differential Method Automated    Comprehensive metabolic panel   Result Value Ref Range    Sodium 141 136 - 145 mmol/L    Potassium 4.4 3.5 - 5.1 mmol/L    Chloride 107 95 - 110 mmol/L    CO2 16 (L) 23 - 29 mmol/L    Glucose 291 (H) 70 - 110 mg/dL    BUN, Bld 20 8 - 23 mg/dL    Creatinine 2.1 (H) 0.5 - 1.4 mg/dL    Calcium 9.0 8.7 - 10.5 mg/dL    Total Protein 7.8 6.0 - 8.4 g/dL    Albumin 3.5 3.5 - 5.2 g/dL    Total Bilirubin 0.4 0.1 - 1.0 mg/dL    Alkaline Phosphatase 171 (H) 55 - 135 U/L    AST 14 10 - 40 U/L    ALT 10 10 - 44 U/L    Anion Gap 18 (H) 8 - 16 mmol/L    eGFR if African American 38 (A) >60 mL/min/1.73 m^2    eGFR if non African American 33 (A) >60 mL/min/1.73 m^2   C-Reactive Protein   Result Value Ref Range    CRP 42.9 (H) 0.0 - 8.2 mg/L   Ferritin   Result Value Ref Range    Ferritin 134 20.0 - 300.0 ng/mL   Lactate Dehydrogenase   Result Value Ref Range     (H) 110 - 260 U/L   CK   Result Value Ref Range    CPK 35 20 - 200 U/L   Lactic Acid, Plasma   Result Value Ref Range    Lactate (Lactic Acid) 6.3 (HH) 0.5 - 2.2 mmol/L   Troponin I   Result Value Ref Range    Troponin I 0.092 (H) 0.000 - 0.026 ng/mL   COVID-19 Rapid Screening   Result Value Ref Range    SARS-CoV-2 RNA, Amplification, Qual Negative Negative   Procalcitonin   Result Value Ref Range    Procalcitonin 0.12 <0.25 ng/mL   Brain Natriuretic Peptide   Result Value Ref Range    BNP 1,197 (H) 0 - 99 pg/mL   Beta - Hydroxybutyrate, Serum   Result Value Ref Range    Beta-Hydroxybutyrate 0.5 0.0 - 0.5 mmol/L   Troponin I   Result Value Ref Range    Troponin I 1.180 (H) 0.000 - 0.026 ng/mL   APTT   Result Value Ref Range    aPTT 27.6 21.0 - 32.0 sec   Protime-INR   Result Value Ref Range     Prothrombin Time 9.8 9.0 - 12.5 sec    INR 0.9 0.8 - 1.2   CBC auto differential   Result Value Ref Range    WBC 13.80 (H) 3.90 - 12.70 K/uL    RBC 4.37 (L) 4.60 - 6.20 M/uL    Hemoglobin 14.2 14.0 - 18.0 g/dL    Hematocrit 40.7 40.0 - 54.0 %    Mean Corpuscular Volume 93 82 - 98 fL    Mean Corpuscular Hemoglobin 32.5 (H) 27.0 - 31.0 pg    Mean Corpuscular Hemoglobin Conc 34.9 32.0 - 36.0 g/dL    RDW 17.5 (H) 11.5 - 14.5 %    Platelets 182 150 - 350 K/uL    MPV 11.9 9.2 - 12.9 fL    Immature Granulocytes 1.2 (H) 0.0 - 0.5 %    Gran # (ANC) 12.4 (H) 1.8 - 7.7 K/uL    Immature Grans (Abs) 0.16 (H) 0.00 - 0.04 K/uL    Lymph # 1.0 1.0 - 4.8 K/uL    Mono # 0.3 0.3 - 1.0 K/uL    Eos # 0.0 0.0 - 0.5 K/uL    Baso # 0.02 0.00 - 0.20 K/uL    nRBC 0 0 /100 WBC    Gran% 89.5 (H) 38.0 - 73.0 %    Lymph% 7.0 (L) 18.0 - 48.0 %    Mono% 2.2 (L) 4.0 - 15.0 %    Eosinophil% 0.0 0.0 - 8.0 %    Basophil% 0.1 0.0 - 1.9 %    Differential Method Automated    Lactic acid, plasma   Result Value Ref Range    Lactate (Lactic Acid) 2.3 (H) 0.5 - 2.2 mmol/L   Magnesium   Result Value Ref Range    Magnesium 1.9 1.6 - 2.6 mg/dL   Phosphorus   Result Value Ref Range    Phosphorus 4.0 2.7 - 4.5 mg/dL   Troponin I   Result Value Ref Range    Troponin I 2.686 (H) 0.000 - 0.026 ng/mL   APTT   Result Value Ref Range    aPTT 27.4 21.0 - 32.0 sec   Renal function panel   Result Value Ref Range    Glucose 151 (H) 70 - 110 mg/dL    Sodium 140 136 - 145 mmol/L    Potassium 4.4 3.5 - 5.1 mmol/L    Chloride 107 95 - 110 mmol/L    CO2 18 (L) 23 - 29 mmol/L    BUN, Bld 31 (H) 8 - 23 mg/dL    Calcium 8.8 8.7 - 10.5 mg/dL    Creatinine 2.0 (H) 0.5 - 1.4 mg/dL    Albumin 3.2 (L) 3.5 - 5.2 g/dL    Phosphorus 4.0 2.7 - 4.5 mg/dL    eGFR if African American 40 (A) >60 mL/min/1.73 m^2    eGFR if non African American 35 (A) >60 mL/min/1.73 m^2    Anion Gap 15 8 - 16 mmol/L   CBC auto differential   Result Value Ref Range    WBC 16.00 (H) 3.90 - 12.70 K/uL    RBC 4.50  (L) 4.60 - 6.20 M/uL    Hemoglobin 14.3 14.0 - 18.0 g/dL    Hematocrit 42.2 40.0 - 54.0 %    Mean Corpuscular Volume 94 82 - 98 fL    Mean Corpuscular Hemoglobin 31.8 (H) 27.0 - 31.0 pg    Mean Corpuscular Hemoglobin Conc 33.9 32.0 - 36.0 g/dL    RDW 17.6 (H) 11.5 - 14.5 %    Platelets 180 150 - 350 K/uL    MPV SEE COMMENT 9.2 - 12.9 fL    Immature Granulocytes 1.3 (H) 0.0 - 0.5 %    Gran # (ANC) 13.5 (H) 1.8 - 7.7 K/uL    Immature Grans (Abs) 0.21 (H) 0.00 - 0.04 K/uL    Lymph # 1.4 1.0 - 4.8 K/uL    Mono # 0.8 0.3 - 1.0 K/uL    Eos # 0.0 0.0 - 0.5 K/uL    Baso # 0.03 0.00 - 0.20 K/uL    nRBC 0 0 /100 WBC    Gran% 84.4 (H) 38.0 - 73.0 %    Lymph% 8.9 (L) 18.0 - 48.0 %    Mono% 5.2 4.0 - 15.0 %    Eosinophil% 0.0 0.0 - 8.0 %    Basophil% 0.2 0.0 - 1.9 %    Platelet Estimate Appears normal     Aniso Slight     Poik Slight     Ovalocytes Occasional     Target Cells Occasional     Tear Drop Cells Occasional     Stomatocytes Present     Differential Method Automated    Troponin I   Result Value Ref Range    Troponin I 2.573 (H) 0.000 - 0.026 ng/mL   APTT   Result Value Ref Range    aPTT 34.2 (H) 21.0 - 32.0 sec   Lactic acid, plasma   Result Value Ref Range    Lactate (Lactic Acid) 1.8 0.5 - 2.2 mmol/L   Echo Color Flow Doppler? Yes   Result Value Ref Range    Ascending aorta 2.81 cm    STJ 3.00 cm    AV mean gradient 2 mmHg    Ao peak papi 0.90 m/s    Ao VTI 14.90 cm    IVRT 51.38 msec    IVS 1.64 (A) 0.6 - 1.1 cm    LA size 3.46 cm    Left Atrium Major Axis 3.42 cm    Left Atrium Minor Axis 2.79 cm    LVIDD 4.49 3.5 - 6.0 cm    LVIDS 3.85 2.1 - 4.0 cm    LVOT diameter 2.06 cm    LVOT peak VTI 10.76 cm    PW 1.74 (A) 0.6 - 1.1 cm    MV Peak A Papi 0.26 m/s    E wave decelartion time 146.42 msec    MV Peak E Papi 0.87 m/s    RA Major Axis 3.05 cm    Sinus 3.29 cm    TAPSE 1.39 cm    TR Max Papi 3.01 m/s    TDI LATERAL 0.07 m/s    TDI SEPTAL 0.04 m/s    LA WIDTH 3.73 cm    Ao root annulus 3.60 cm    LV Diastolic Volume 92.19  mL    LV Systolic Volume 63.94 mL    LVOT peak neeru 0.60 m/s    LV LATERAL E/E' RATIO 12.43 m/s    LV SEPTAL E/E' RATIO 21.75 m/s    FS 14 %    LA volume 33.71 cm3    LV mass 330.84 g    Left Ventricle Relative Wall Thickness 0.78 cm    AV valve area 2.41 cm2    AV Velocity Ratio 0.67     AV index (prosthetic) 0.72     E/A ratio 3.35     Mean e' 0.06 m/s    LVOT area 3.3 cm2    LVOT stroke volume 35.84 cm3    AV peak gradient 3 mmHg    E/E' ratio 15.82 m/s    LV Systolic Volume Index 31.2 mL/m2    LV Diastolic Volume Index 44.92 mL/m2    LA Volume Index 16.4 mL/m2    LV Mass Index 161 g/m2    Triscuspid Valve Regurgitation Peak Gradient 36 mmHg    BSA 2.09 m2   ISTAT PROCEDURE   Result Value Ref Range    POC PH 7.185 (LL) 7.35 - 7.45    POC PCO2 46.7 (H) 35 - 45 mmHg    POC PO2 82 80 - 100 mmHg    POC HCO3 17.6 (L) 24 - 28 mmol/L    POC BE -11 -2 to 2 mmol/L    POC SATURATED O2 93 (L) 95 - 100 %    Rate 14     Sample ARTERIAL     Site RR     Allens Test Pass     DelSys CPAP/BiPAP     Mode BiPAP     FiO2 60     IP 12     EP 6          Imaging Results          X-Ray Chest AP Portable (Final result)  Result time 05/20/20 09:15:16    Final result by Jose De Paz MD (05/20/20 09:15:16)                 Impression:      Finding compatible with pulmonary edema. Mild cardiomegaly. No pleural effusion.    Right apical emphysematous bleb.  Moderate severity upper lobe predominant centrilobular emphysema.      Electronically signed by: Jose De Paz  Date:    05/20/2020  Time:    09:15             Narrative:    EXAMINATION:  XR CHEST AP PORTABLE    CLINICAL HISTORY:  Suspected Covid-19 Virus Infection;.    TECHNIQUE:  Single frontal portable view of the chest was performed.    COMPARISON:  None    FINDINGS:  Support devices: None    Right apical emphysematous bleb.  Moderate severity upper lobe predominant centrilobular emphysema.    Diffuse hazy alveolar and interstitial opacification compatible with pulmonary edema.  Mild  cardiomegaly.  No pleural effusion.    Bones are intact.                                 The EKG was ordered, reviewed, and independently interpreted by the ED provider.  Interpretation time: 8:03  Rate: 144 BPM  Rhythm: supraventricular tachycardia  Interpretation: Left ventricular hypertrophy with QRS widening. Nonspecific ST and T wave abnormality. No STEMI.    The EKG was ordered, reviewed, and independently interpreted by the ED provider.  Interpretation time: 9:00  Rate: 113 BPM  Rhythm: sinus tachycardia  Interpretation: Biatrial enlargement. Cannot rule out infarct, age undetermined. No STEMI.        The Emergency Provider reviewed the vital signs and test results, which are outlined above.     ED Discussion     8:23 AM: Discussed pt's case with Dr. Hall (Cardiology) who recommends trying a few doses of adenosine and see it it terminates, it not then some metoprolol.    9:46 AM: Discussed pt's case with Dr. Hall (Cardiology) who recommends low dose metoprolol po as tolerated and might want to retest patient.    10:12 AM: Discussed case with Dr. Eng (Pulmonary Disease). Dr. Eng agrees with current care and management of pt and accepts admission.   Admitting Service: Hospital Medicine  Admit to: MICU    Re-evaluated pt. I have discussed test results, shared treatment plan, and the need for admission with patient and family at bedside. Pt and family express understanding at this time and agree with all information. All questions answered. Pt and family have no further questions or concerns at this time. Pt is ready for admit.       MDM        Medical Decision Making:   Clinical Tests:   Lab Tests: Ordered and Reviewed  Radiological Study: Ordered and Reviewed  Medical Tests: Ordered and Reviewed           ED Medication(s):  Medications   sodium chloride 0.9% flush 10 mL (has no administration in time range)   acetaminophen tablet 650 mg (has no administration in time range)   furosemide injection 40 mg  (40 mg Intravenous Given 5/21/20 1018)   heparin 25,000 units in dextrose 5% 250 mL (100 units/mL) infusion LOW INTENSITY nomogram - [facility] (17 Units/kg/hr × 78.2 kg (Adjusted) Intravenous New Bag 5/21/20 1311)   heparin 25,000 units in dextrose 5% (100 units/ml) IV bolus from bag - ADDITIONAL PRN BOLUS - 60 units/kg (max bolus 4000 units) (4,000 Units Intravenous Bolus from Bag 5/21/20 0541)   heparin 25,000 units in dextrose 5% (100 units/ml) IV bolus from bag - ADDITIONAL PRN BOLUS - 30 units/kg (max bolus 4000 units) (2,350 Units Intravenous Bolus from Bag 5/21/20 1310)   nicotine 21 mg/24 hr 1 patch (1 patch Transdermal Patch Applied 5/21/20 0315)   aspirin EC tablet 81 mg (81 mg Oral Given 5/21/20 0907)   carvediloL tablet 3.125 mg (3.125 mg Oral Given 5/21/20 1123)   lisinopriL tablet 5 mg (5 mg Oral Given 5/21/20 1124)   atorvastatin tablet 40 mg (40 mg Oral Given 5/21/20 1124)   clopidogreL tablet 75 mg (has no administration in time range)   albuterol-ipratropium 2.5 mg-0.5 mg/3 mL nebulizer solution 3 mL (has no administration in time range)   methylPREDNISolone sodium succinate injection 125 mg (125 mg Intravenous Given 5/20/20 0817)   adenosine injection 6 mg (6 mg Intravenous Given 5/20/20 0832)   adenosine injection 12 mg (12 mg Intravenous Given 5/20/20 0841)   metoprolol injection 5 mg (5 mg Intravenous Given 5/20/20 0844)   cefTRIAXone (ROCEPHIN) 1 g/50 mL D5W IVPB (0 g Intravenous Stopped 5/20/20 0944)   furosemide injection 40 mg (40 mg Intravenous Given 5/20/20 0912)   nitroGLYCERIN 2% TD oint ointment 1 inch (1 inch Topical (Top) Given 5/20/20 0927)   aspirin tablet 325 mg (325 mg Oral Given 5/20/20 0929)   labetaloL injection 20 mg (20 mg Intravenous Given 5/20/20 1032)   heparin 25,000 units in dextrose 5% (100 units/ml) IV bolus from bag INITIAL BOLUS (max bolus 4000 units) (4,000 Units Intravenous Bolus from Bag 5/20/20 2244)   clopidogreL tablet 300 mg (300 mg Oral Given 5/21/20 1125)        There are no discharge medications for this patient.              Scribe Attestation:   Scribe #1: I performed the above scribed service and the documentation accurately describes the services I performed. I attest to the accuracy of the note.     Attending:   Physician Attestation Statement for Scribe #1: I, Davis Lugo MD, personally performed the services described in this documentation, as scribed by Natalya Aguirre, in my presence, and it is both accurate and complete.           Clinical Impression       ICD-10-CM ICD-9-CM   1. Respiratory distress, acute R06.03 518.82   2. Tachycardia R00.0 785.0   3. SVT (supraventricular tachycardia) I47.1 427.89   4. Congestive heart failure, unspecified HF chronicity, unspecified heart failure type I50.9 428.0   5. CHF (congestive heart failure) I50.9 428.0       Disposition:   Disposition: Admitted  Condition: Serious         Davis Lugo MD  05/21/20 9616

## 2020-05-20 NOTE — ED NOTES
Dr Lugo at bedside with Susie RN and Sharron RN to push 12 mg adenosine. Pt connected to EKG to run a continuous rhythm during administration

## 2020-05-20 NOTE — Clinical Note
Angiography performed of the left coronary arteries in multiple views.All catheter exchanges occurs over the wire.

## 2020-05-20 NOTE — PROGRESS NOTES
RT attempted 50 VM pt xiomy x 5min. RR increased 40 . Oxygen sats stayed 97 %. PT stated he could breath better on BIPAP. Placed pt back on BIPAP. NO resp distress obs.

## 2020-05-20 NOTE — HPI
Pt presents with acute respiratory failure.  Per ER chart, suspicious for Covid 19.  Cardiology consult performed based on communication from ER physician and chart review.  He has h/o COPD.  Pt presented to ER with c/o dyspnea since yesterday with associated wheezing.  Per chart no cp sxs.  ECG in ER showed possible SVT.  Adenosine administered and f/u ecgs looked more like sinus tachycardia.  BP very high on arrival 217/109.  Given Labetalol in ER.    BNP 1194  Troponin 0.09  Lactic acid +  ecg with inferolateral st-t abnl.  No old records to compare/review.

## 2020-05-20 NOTE — SUBJECTIVE & OBJECTIVE
Past Medical History:   Diagnosis Date    COPD (chronic obstructive pulmonary disease)        Past Surgical History:   Procedure Laterality Date    FRACTURE SURGERY         Review of patient's allergies indicates:  No Known Allergies    No current facility-administered medications on file prior to encounter.      No current outpatient medications on file prior to encounter.     Family History     None        Tobacco Use    Smoking status: Former Smoker   Substance and Sexual Activity    Alcohol use: Not Currently    Drug use: Never    Sexual activity: Not on file     Review of Systems   Constitution: Negative.   HENT: Negative.    Eyes: Negative.    Cardiovascular: Positive for dyspnea on exertion.   Respiratory: Positive for shortness of breath and wheezing.    Endocrine: Negative.    Hematologic/Lymphatic: Negative.    Skin: Negative.    Musculoskeletal: Negative.    Gastrointestinal: Negative.    Genitourinary: Negative.    Neurological: Negative.    Psychiatric/Behavioral: Negative.    Allergic/Immunologic: Negative.      Objective:     Vital Signs (Most Recent):  Temp: 98.7 °F (37.1 °C) (05/20/20 1502)  Pulse: 97 (05/20/20 1531)  Resp: (!) 24 (05/20/20 1531)  BP: (!) 139/95 (05/20/20 1531)  SpO2: 97 % (05/20/20 1531) Vital Signs (24h Range):  Temp:  [98.5 °F (36.9 °C)-98.7 °F (37.1 °C)] 98.7 °F (37.1 °C)  Pulse:  [] 97  Resp:  [21-44] 24  SpO2:  [90 %-98 %] 97 %  BP: (126-217)/() 139/95     Weight: 89.4 kg (197 lb)  Body mass index is 29.09 kg/m².    SpO2: 97 %  O2 Device (Oxygen Therapy): BiPAP      Intake/Output Summary (Last 24 hours) at 5/20/2020 1550  Last data filed at 5/20/2020 0944  Gross per 24 hour   Intake 50 ml   Output --   Net 50 ml       Lines/Drains/Airways     Peripheral Intravenous Line                 Peripheral IV - Single Lumen 05/20/20 0810 20 G Left Antecubital less than 1 day                Physical Exam     Not examined due to possible Covid 19  infection.      Significant Labs:   BMP:   Recent Labs   Lab 05/20/20  0811   *      K 4.4      CO2 16*   BUN 20   CREATININE 2.1*   CALCIUM 9.0   , CMP   Recent Labs   Lab 05/20/20  0811      K 4.4      CO2 16*   *   BUN 20   CREATININE 2.1*   CALCIUM 9.0   PROT 7.8   ALBUMIN 3.5   BILITOT 0.4   ALKPHOS 171*   AST 14   ALT 10   ANIONGAP 18*   ESTGFRAFRICA 38*   EGFRNONAA 33*   , CBC   Recent Labs   Lab 05/20/20  0811   WBC 13.83*   HGB 15.5   HCT 46.7      , INR No results for input(s): INR, PROTIME in the last 48 hours. and Troponin   Recent Labs   Lab 05/20/20  0811   TROPONINI 0.092*       Significant Imaging: Echocardiogram:   Transthoracic echo (TTE) complete (Cupid Only):   Results for orders placed or performed during the hospital encounter of 05/20/20   Echo Color Flow Doppler? Yes   Result Value Ref Range    Ascending aorta 2.81 cm    STJ 3.00 cm    AV mean gradient 2 mmHg    Ao peak papi 0.90 m/s    Ao VTI 14.90 cm    IVRT 51.38 msec    IVS 1.64 (A) 0.6 - 1.1 cm    LA size 3.46 cm    Left Atrium Major Axis 3.42 cm    Left Atrium Minor Axis 2.79 cm    LVIDD 4.49 3.5 - 6.0 cm    LVIDS 3.85 2.1 - 4.0 cm    LVOT diameter 2.06 cm    LVOT peak VTI 10.76 cm    PW 1.74 (A) 0.6 - 1.1 cm    MV Peak A Papi 0.26 m/s    E wave decelartion time 146.42 msec    MV Peak E Papi 0.87 m/s    RA Major Axis 3.05 cm    Sinus 3.29 cm    TAPSE 1.39 cm    TR Max Papi 3.01 m/s    TDI LATERAL 0.07 m/s    TDI SEPTAL 0.04 m/s    LA WIDTH 3.73 cm    Ao root annulus 3.60 cm    LV Diastolic Volume 92.19 mL    LV Systolic Volume 63.94 mL    LVOT peak papi 0.60 m/s    LV LATERAL E/E' RATIO 12.43 m/s    LV SEPTAL E/E' RATIO 21.75 m/s    FS 14 %    LA volume 33.71 cm3    LV mass 330.84 g    Left Ventricle Relative Wall Thickness 0.78 cm    AV valve area 2.41 cm2    AV Velocity Ratio 0.67     AV index (prosthetic) 0.72     E/A ratio 3.35     Mean e' 0.06 m/s    LVOT area 3.3 cm2    LVOT stroke volume  35.84 cm3    AV peak gradient 3 mmHg    E/E' ratio 15.82 m/s    LV Systolic Volume Index 31.2 mL/m2    LV Diastolic Volume Index 44.92 mL/m2    LA Volume Index 16.4 mL/m2    LV Mass Index 161 g/m2    Triscuspid Valve Regurgitation Peak Gradient 36 mmHg    BSA 2.09 m2    Narrative    · Severely decreased left ventricular systolic function. The estimated   ejection fraction is 20%.  · Grade III (severe) left ventricular diastolic dysfunction consistent   with restrictive physiology.  · Normal right ventricular systolic function.  · Concentric left ventricular hypertrophy.

## 2020-05-21 PROBLEM — R06.03 RESPIRATORY DISTRESS, ACUTE: Status: ACTIVE | Noted: 2020-05-21

## 2020-05-21 PROBLEM — J96.00 RESPIRATORY FAILURE, ACUTE: Status: RESOLVED | Noted: 2020-05-20 | Resolved: 2020-05-21

## 2020-05-21 PROBLEM — D72.829 LEUKOCYTOSIS: Status: ACTIVE | Noted: 2020-05-21

## 2020-05-21 PROBLEM — Z87.891 SMOKING HISTORY: Status: ACTIVE | Noted: 2020-05-21

## 2020-05-21 LAB
ALBUMIN SERPL BCP-MCNC: 3.2 G/DL (ref 3.5–5.2)
ANION GAP SERPL CALC-SCNC: 15 MMOL/L (ref 8–16)
ANISOCYTOSIS BLD QL SMEAR: SLIGHT
APTT BLDCRRT: 27.4 SEC (ref 21–32)
APTT BLDCRRT: 33.3 SEC (ref 21–32)
APTT BLDCRRT: 34.2 SEC (ref 21–32)
BASOPHILS # BLD AUTO: 0.03 K/UL (ref 0–0.2)
BASOPHILS NFR BLD: 0.2 % (ref 0–1.9)
BUN SERPL-MCNC: 31 MG/DL (ref 8–23)
CALCIUM SERPL-MCNC: 8.8 MG/DL (ref 8.7–10.5)
CHLORIDE SERPL-SCNC: 107 MMOL/L (ref 95–110)
CO2 SERPL-SCNC: 18 MMOL/L (ref 23–29)
CREAT SERPL-MCNC: 2 MG/DL (ref 0.5–1.4)
DACRYOCYTES BLD QL SMEAR: ABNORMAL
DIFFERENTIAL METHOD: ABNORMAL
EOSINOPHIL # BLD AUTO: 0 K/UL (ref 0–0.5)
EOSINOPHIL NFR BLD: 0 % (ref 0–8)
ERYTHROCYTE [DISTWIDTH] IN BLOOD BY AUTOMATED COUNT: 17.6 % (ref 11.5–14.5)
EST. GFR  (AFRICAN AMERICAN): 40 ML/MIN/1.73 M^2
EST. GFR  (NON AFRICAN AMERICAN): 35 ML/MIN/1.73 M^2
GLUCOSE SERPL-MCNC: 151 MG/DL (ref 70–110)
HCT VFR BLD AUTO: 42.2 % (ref 40–54)
HGB BLD-MCNC: 14.3 G/DL (ref 14–18)
IMM GRANULOCYTES # BLD AUTO: 0.21 K/UL (ref 0–0.04)
IMM GRANULOCYTES NFR BLD AUTO: 1.3 % (ref 0–0.5)
LACTATE SERPL-SCNC: 1.8 MMOL/L (ref 0.5–2.2)
LYMPHOCYTES # BLD AUTO: 1.4 K/UL (ref 1–4.8)
LYMPHOCYTES NFR BLD: 8.9 % (ref 18–48)
MCH RBC QN AUTO: 31.8 PG (ref 27–31)
MCHC RBC AUTO-ENTMCNC: 33.9 G/DL (ref 32–36)
MCV RBC AUTO: 94 FL (ref 82–98)
MONOCYTES # BLD AUTO: 0.8 K/UL (ref 0.3–1)
MONOCYTES NFR BLD: 5.2 % (ref 4–15)
NEUTROPHILS # BLD AUTO: 13.5 K/UL (ref 1.8–7.7)
NEUTROPHILS NFR BLD: 84.4 % (ref 38–73)
NRBC BLD-RTO: 0 /100 WBC
OVALOCYTES BLD QL SMEAR: ABNORMAL
PHOSPHATE SERPL-MCNC: 4 MG/DL (ref 2.7–4.5)
PLATELET # BLD AUTO: 180 K/UL (ref 150–350)
PLATELET BLD QL SMEAR: ABNORMAL
PMV BLD AUTO: ABNORMAL FL (ref 9.2–12.9)
POIKILOCYTOSIS BLD QL SMEAR: SLIGHT
POTASSIUM SERPL-SCNC: 4.4 MMOL/L (ref 3.5–5.1)
RBC # BLD AUTO: 4.5 M/UL (ref 4.6–6.2)
SODIUM SERPL-SCNC: 140 MMOL/L (ref 136–145)
STOMATOCYTES BLD QL SMEAR: PRESENT
TARGETS BLD QL SMEAR: ABNORMAL
TROPONIN I SERPL DL<=0.01 NG/ML-MCNC: 2.57 NG/ML (ref 0–0.03)
WBC # BLD AUTO: 16 K/UL (ref 3.9–12.7)

## 2020-05-21 PROCEDURE — 99291 PR CRITICAL CARE, E/M 30-74 MINUTES: ICD-10-PCS | Mod: ,,, | Performed by: INTERNAL MEDICINE

## 2020-05-21 PROCEDURE — 99233 PR SUBSEQUENT HOSPITAL CARE,LEVL III: ICD-10-PCS | Mod: ,,, | Performed by: INTERNAL MEDICINE

## 2020-05-21 PROCEDURE — 80069 RENAL FUNCTION PANEL: CPT

## 2020-05-21 PROCEDURE — 85730 THROMBOPLASTIN TIME PARTIAL: CPT

## 2020-05-21 PROCEDURE — 25000003 PHARM REV CODE 250: Performed by: INTERNAL MEDICINE

## 2020-05-21 PROCEDURE — 63600175 PHARM REV CODE 636 W HCPCS: Performed by: INTERNAL MEDICINE

## 2020-05-21 PROCEDURE — 83605 ASSAY OF LACTIC ACID: CPT

## 2020-05-21 PROCEDURE — S4991 NICOTINE PATCH NONLEGEND: HCPCS | Performed by: INTERNAL MEDICINE

## 2020-05-21 PROCEDURE — 99291 CRITICAL CARE FIRST HOUR: CPT | Mod: ,,, | Performed by: INTERNAL MEDICINE

## 2020-05-21 PROCEDURE — 85025 COMPLETE CBC W/AUTO DIFF WBC: CPT

## 2020-05-21 PROCEDURE — 36415 COLL VENOUS BLD VENIPUNCTURE: CPT

## 2020-05-21 PROCEDURE — 84484 ASSAY OF TROPONIN QUANT: CPT

## 2020-05-21 PROCEDURE — 21400001 HC TELEMETRY ROOM

## 2020-05-21 PROCEDURE — 85730 THROMBOPLASTIN TIME PARTIAL: CPT | Mod: 91

## 2020-05-21 PROCEDURE — 99900035 HC TECH TIME PER 15 MIN (STAT)

## 2020-05-21 PROCEDURE — 27000221 HC OXYGEN, UP TO 24 HOURS

## 2020-05-21 PROCEDURE — 99233 SBSQ HOSP IP/OBS HIGH 50: CPT | Mod: ,,, | Performed by: INTERNAL MEDICINE

## 2020-05-21 RX ORDER — ASPIRIN 81 MG/1
81 TABLET ORAL DAILY
Status: DISCONTINUED | OUTPATIENT
Start: 2020-05-21 | End: 2020-05-26

## 2020-05-21 RX ORDER — LISINOPRIL 5 MG/1
5 TABLET ORAL DAILY
Status: DISCONTINUED | OUTPATIENT
Start: 2020-05-21 | End: 2020-05-23

## 2020-05-21 RX ORDER — IBUPROFEN 200 MG
1 TABLET ORAL DAILY
Status: DISCONTINUED | OUTPATIENT
Start: 2020-05-21 | End: 2020-05-21

## 2020-05-21 RX ORDER — CLOPIDOGREL BISULFATE 75 MG/1
75 TABLET ORAL DAILY
Status: DISCONTINUED | OUTPATIENT
Start: 2020-05-22 | End: 2020-05-29 | Stop reason: HOSPADM

## 2020-05-21 RX ORDER — IBUPROFEN 200 MG
1 TABLET ORAL DAILY
Status: DISCONTINUED | OUTPATIENT
Start: 2020-05-21 | End: 2020-05-28

## 2020-05-21 RX ORDER — ATORVASTATIN CALCIUM 40 MG/1
40 TABLET, FILM COATED ORAL NIGHTLY
Status: DISCONTINUED | OUTPATIENT
Start: 2020-05-21 | End: 2020-05-27

## 2020-05-21 RX ORDER — CLOPIDOGREL BISULFATE 75 MG/1
300 TABLET ORAL ONCE
Status: COMPLETED | OUTPATIENT
Start: 2020-05-21 | End: 2020-05-21

## 2020-05-21 RX ORDER — IPRATROPIUM BROMIDE AND ALBUTEROL SULFATE 2.5; .5 MG/3ML; MG/3ML
3 SOLUTION RESPIRATORY (INHALATION) EVERY 6 HOURS PRN
Status: DISCONTINUED | OUTPATIENT
Start: 2020-05-21 | End: 2020-05-29 | Stop reason: HOSPADM

## 2020-05-21 RX ORDER — CARVEDILOL 3.12 MG/1
3.12 TABLET ORAL 2 TIMES DAILY
Status: DISCONTINUED | OUTPATIENT
Start: 2020-05-21 | End: 2020-05-29 | Stop reason: HOSPADM

## 2020-05-21 RX ADMIN — ATORVASTATIN CALCIUM 40 MG: 40 TABLET, FILM COATED ORAL at 11:05

## 2020-05-21 RX ADMIN — FUROSEMIDE 40 MG: 10 INJECTION, SOLUTION INTRAMUSCULAR; INTRAVENOUS at 09:05

## 2020-05-21 RX ADMIN — CLOPIDOGREL BISULFATE 300 MG: 75 TABLET ORAL at 11:05

## 2020-05-21 RX ADMIN — FUROSEMIDE 40 MG: 10 INJECTION, SOLUTION INTRAMUSCULAR; INTRAVENOUS at 10:05

## 2020-05-21 RX ADMIN — CARVEDILOL 3.12 MG: 3.12 TABLET, FILM COATED ORAL at 09:05

## 2020-05-21 RX ADMIN — Medication 1 PATCH: at 03:05

## 2020-05-21 RX ADMIN — ASPIRIN 81 MG: 81 TABLET, COATED ORAL at 09:05

## 2020-05-21 RX ADMIN — HEPARIN SODIUM 17 UNITS/KG/HR: 10000 INJECTION, SOLUTION INTRAVENOUS at 01:05

## 2020-05-21 RX ADMIN — LISINOPRIL 5 MG: 5 TABLET ORAL at 11:05

## 2020-05-21 RX ADMIN — CARVEDILOL 3.12 MG: 3.12 TABLET, FILM COATED ORAL at 11:05

## 2020-05-21 NOTE — NURSING
In good spirits this morning, asking what's planned for the day? I informed him that when the doctor visits will discuss his plan. Denies chest pain or SOB at present. Able to reposition self. Drinking sips of water over night without problems.

## 2020-05-21 NOTE — ASSESSMENT & PLAN NOTE
Does not appear to be in exacerbation.    Supplement oxygenation. Keep SAO2 >= 92%.                       Continue with LABA, LUCERO as needed        Continue nighttime BiPAP 10 / 5 and as needed.

## 2020-05-21 NOTE — NURSING
Took medication with water and no issues. Daughter called for a status update, emotional support provided.

## 2020-05-21 NOTE — PROVIDER TRANSFER
[facility]  Transfer of Care Note      Patient Name: Ceasar Hernandez  MRN: [id number]  Admission Date: 5/20/2020  Hospital Length of Stay: 1 days  Transfer Date: 5/21/2020  Attending Physician: Scott Eng MD   Primary Care Provider: Provider Not[employer]ystem  Reason for Admission: CHF    HPI:   Came to the ED today for worsening shortness of breath.  Started Monday and is getting worse with severe episodes.  Exertion makes it worse and resting and laying down makes it better.  He denies chest pain.  He did have several episodes of light headedness without loss of consciousness.  Denies any other recent illness.  He was in his normal state of health until Monday.     Hospital Course:   05/21: Seen and examined at bedside. Hospital chart reviewed. No acute interval detrimental events noted. He reports that he  has significantly improved. Off BIPAP. Clinically and hemodynamically stable. Reviewed with cardiology. Patient is ok for transfer to telemetry.    * Acute combined systolic and diastolic CHF, NYHA class 4  Patient reports no known chronic heart disease.  Cardiac echo showed severe systolic dysfunction - EF 20%.      Cardiology consulted and assisting with management.   Appreciate input.     Further management per cardiology  Optimize CHF regimen.  Preload and afterload reduction.[Furosemide IV 40 mg every 12 hours]  Input and Output monitoring  Daily weighing.  Dietary salt restriction.  Alcohol and tobacco avoidance.      Smoking history  Smoking cessation counseling.   NRT with Nicotine Transdermal Patch.    Lactic acidosis  Due to systemic hypoperfusion and hypoxia.  Optimizing hemodynamics.  Trend lactic acid.    COPD (chronic obstructive pulmonary disease)  Does not appear to be in exacerbation.    Supplement oxygenation. Keep SAO2 >= 92%.                       Continue with LABA, LUCERO as needed        Continue nighttime BiPAP 10 / 5 and as needed.                                              Essential hypertension  Monitor and trend.     Elevated troponin  Cardiology consulted and assisting with management.   Appreciate input.     Due to decompensated heart failure vs. Ischemic heart disease.    Continue to trend cardiac enzymes.    Continue Heparin infusion.       Continue current medical therapy and  risk factor modification           [x]        Weight management is recommended by the AHA for the secondary prevention  [x]        Treatment of High Blood Pressure   (goal of < 140/90 mm Hg // < 130/80 mm Hg in patients with DM or CKD)  []        Beta-blocker therapy.  [x]        Aspirin therapy (81 to 162 mg daily)   [x]        Statin therapy.    [x]        ACE inhibitor therapy.  [x]        Antiplatelet Agent therapy.  [x]        Influenza Vaccination.      Abnormal ECG  Acute coronary syndrome vs decompensated heart failure.  Cardiology assisting with management.  Continue to monitor.    Neuro  Neurochecks per routine.    ID  Monitor fever curve. panculture for temperatures greater than 101 degrees F. Source control: n/a    Immunology/Multi System  Immunization status reviewed and up to date.    Hematology  Monitor hemogram. Transfuse as needed.    GI  Adult cardiac diet. Stress ulcer prophylaxis.      Consults (From admission, onward)        Status Ordering Provider     Inpatient consult to Cardiology  Once     Provider:  (Not yet assigned)    Completed TUSHAR ROCHA     Inpatient consult to Pulmonology  Once     Provider:  (Not yet assigned)    Acknowledged ROLANDO AMBROSIO        * No surgery found *    Diet Orders          Diet Cardiac: Cardiac starting at 05/21 1052        Activity Orders          Diet Cardiac: Cardiac starting at 05/21 1052        Pending Diagnostic Studies:     Procedure Component Value Units Date/Time    Lactic acid, plasma [[id number]] Collected:  05/21/20 1151    Order Status:  Sent Lab Status:  In process Updated:  05/21/20 1156    Specimen:  Blood          Scott Eng MD  [facility]

## 2020-05-21 NOTE — ASSESSMENT & PLAN NOTE
Cardiology following  Heparin infusion in progress  Plavix, BB, ACE inhibitor, STATIN and ASA in progress  Possibly demand ischemia secondary to CHF, Sepsis or Aflutter vs sinus tach

## 2020-05-21 NOTE — ASSESSMENT & PLAN NOTE
Elevated upon admission  Fluctuating  Diuresis with IV lasix continued  On Coreg and lisinopril  Hydralazine 10 mg IV every 6 hours prn SBP > 170

## 2020-05-21 NOTE — ASSESSMENT & PLAN NOTE
6.3 >> 2.3 >> 1.8 thought secondary to hypoperfusion and hypoxia  Now resolved  Blood cultures NGTD  CXR no signs of infectious source

## 2020-05-21 NOTE — SUBJECTIVE & OBJECTIVE
Past Medical History:   Diagnosis Date    COPD (chronic obstructive pulmonary disease)        Past Surgical History:   Procedure Laterality Date    FRACTURE SURGERY         Review of patient's allergies indicates:  No Known Allergies    Family History     Reviewed and not pertinent.        Tobacco Use    Smoking status: Former Smoker   Substance and Sexual Activity    Alcohol use: Not Currently    Drug use: Never    Sexual activity: Not on file         Review of Systems   Constitutional: Negative.  Negative for chills and fever.   HENT: Negative.  Negative for congestion and sore throat.    Eyes: Negative.  Negative for visual disturbance.   Respiratory: Positive for shortness of breath. Negative for cough and wheezing.    Cardiovascular: Negative.  Negative for chest pain.   Gastrointestinal: Negative for abdominal pain, diarrhea, nausea and vomiting.   Endocrine: Negative.    Genitourinary: Negative.    Musculoskeletal: Negative.  Negative for myalgias and neck stiffness.   Skin: Negative.  Negative for color change and pallor.   Allergic/Immunologic: Negative.    Neurological: Negative.    Hematological: Negative.    Psychiatric/Behavioral: Negative.    All other systems reviewed and are negative.    Objective:     Vital Signs (Most Recent):  Temp: 98.5 °F (36.9 °C) (05/20/20 1803)  Pulse: 103 (05/20/20 2002)  Resp: (!) 25 (05/20/20 2002)  BP: (!) 142/86 (05/20/20 2002)  SpO2: 98 % (05/20/20 2002) Vital Signs (24h Range):  Temp:  [98.5 °F (36.9 °C)-98.7 °F (37.1 °C)] 98.5 °F (36.9 °C)  Pulse:  [] 103  Resp:  [19-44] 25  SpO2:  [90 %-99 %] 98 %  BP: (126-217)/() 142/86     Weight: 89.4 kg (197 lb)  Body mass index is 29.09 kg/m².      Intake/Output Summary (Last 24 hours) at 5/20/2020 2112  Last data filed at 5/20/2020 1423  Gross per 24 hour   Intake 50 ml   Output 320 ml   Net -270 ml       Physical Exam   Constitutional: He is oriented to person, place, and time. He appears well-developed and  well-nourished. No distress.   HENT:   Head: Normocephalic and atraumatic.   Mouth/Throat: Oropharynx is clear and moist.   Eyes: Pupils are equal, round, and reactive to light. Conjunctivae and EOM are normal.   Neck: No JVD present. No thyromegaly present.   Cardiovascular: Regular rhythm and normal heart sounds. Exam reveals no gallop and no friction rub.   No murmur heard.  Regular tachycardia   Pulmonary/Chest: No respiratory distress. He has no wheezes. He has no rales.   Mild tachypnea.  Breathing regularly on continuous BiPAP.  Crackles bilaterally in the bases with reduced breath sounds upper lung on the right.   Abdominal: Soft. Bowel sounds are normal. He exhibits no distension. There is no tenderness. There is no rebound and no guarding.   Musculoskeletal: Normal range of motion. He exhibits no edema or tenderness.   No peripheral edema.   Lymphadenopathy:     He has no cervical adenopathy.   Neurological: He is alert and oriented to person, place, and time. He has normal reflexes. He displays normal reflexes. No cranial nerve deficit.   Skin: Skin is warm and dry. No rash noted. He is not diaphoretic. No erythema.   Psychiatric: He has a normal mood and affect. His behavior is normal. Judgment and thought content normal.       Vents:  Oxygen Concentration (%): 40 (05/20/20 1552)    Lines/Drains/Airways     Peripheral Intravenous Line                 Peripheral IV - Single Lumen 05/20/20 0810 20 G Left Antecubital less than 1 day                Significant Labs:    CBC/Anemia Profile:  Recent Labs   Lab 05/20/20  0811   WBC 13.83*   HGB 15.5   HCT 46.7      MCV 97   RDW 18.4*        Chemistries:  Recent Labs   Lab 05/20/20  0811      K 4.4      CO2 16*   BUN 20   CREATININE 2.1*   CALCIUM 9.0   ALBUMIN 3.5   PROT 7.8   BILITOT 0.4   ALKPHOS 171*   ALT 10   AST 14       All pertinent labs within the past 24 hours have been reviewed.    Significant Imaging:   I have reviewed all  pertinent imaging results/findings within the past 24 hours.

## 2020-05-21 NOTE — ASSESSMENT & PLAN NOTE
2 d ECHO  · Severely decreased left ventricular systolic function. The estimated ejection fraction is 20%.  · Grade III (severe) left ventricular diastolic dysfunction consistent with restrictive physiology.  · Normal right ventricular systolic function.  · Concentric left ventricular hypertrophy.    Telemetry monitoring, daily weights, cardiac diet, 1500 cc fluid restriction, Lasix 40 mg IV bid  Lisinopril  Strict I & O  BNP = 1197  Has diuresed close to 2 L fluid  Cardiology following

## 2020-05-21 NOTE — H&P
[facility]  Critical Care Medicine  History & Physical    Patient Name: Ceasar Hernandez  MRN: [id number]  Admission Date: 5/20/2020  Hospital Length of Stay: 1 days  Code Status: Full Code  Attending Physician: No att. providers found   Primary Care Provider: Provider Not[employer]ystem   Principal Problem: Acute combined systolic and diastolic CHF, NYHA class 4    Subjective:     HPI:  Came to the ED today for worsening shortness of breath.  Started Monday and is getting worse with severe episodes.  Exertion makes it worse and resting and laying down makes it better.  He denies chest pain.  He did have several episodes of light headedness without loss of consciousness.  Denies any other recent illness.  He was in his normal state of health until Monday.    Hospital/ICU Course:  No notes on file     Past Medical History:   Diagnosis Date    COPD (chronic obstructive pulmonary disease)        Past Surgical History:   Procedure Laterality Date    FRACTURE SURGERY         Review of patient's allergies indicates:  No Known Allergies    Family History     Reviewed and not pertinent.        Tobacco Use    Smoking status: Former Smoker   Substance and Sexual Activity    Alcohol use: Not Currently    Drug use: Never    Sexual activity: Not on file         Review of Systems   Constitutional: Negative.  Negative for chills and fever.   HENT: Negative.  Negative for congestion and sore throat.    Eyes: Negative.  Negative for visual disturbance.   Respiratory: Positive for shortness of breath. Negative for cough and wheezing.    Cardiovascular: Negative.  Negative for chest pain.   Gastrointestinal: Negative for abdominal pain, diarrhea, nausea and vomiting.   Endocrine: Negative.    Genitourinary: Negative.    Musculoskeletal: Negative.  Negative for myalgias and neck stiffness.   Skin: Negative.  Negative for color change and pallor.   Allergic/Immunologic: Negative.    Neurological: Negative.    Hematological:  Negative.    Psychiatric/Behavioral: Negative.    All other systems reviewed and are negative.    Objective:     Vital Signs (Most Recent):  Temp: 98.5 °F (36.9 °C) (05/20/20 1803)  Pulse: 103 (05/20/20 2002)  Resp: (!) 25 (05/20/20 2002)  BP: (!) 142/86 (05/20/20 2002)  SpO2: 98 % (05/20/20 2002) Vital Signs (24h Range):  Temp:  [98.5 °F (36.9 °C)-98.7 °F (37.1 °C)] 98.5 °F (36.9 °C)  Pulse:  [] 103  Resp:  [19-44] 25  SpO2:  [90 %-99 %] 98 %  BP: (126-217)/() 142/86     Weight: 89.4 kg (197 lb)  Body mass index is 29.09 kg/m².      Intake/Output Summary (Last 24 hours) at 5/20/2020 2112  Last data filed at 5/20/2020 1423  Gross per 24 hour   Intake 50 ml   Output 320 ml   Net -270 ml       Physical Exam   Constitutional: He is oriented to person, place, and time. He appears well-developed and well-nourished. No distress.   HENT:   Head: Normocephalic and atraumatic.   Mouth/Throat: Oropharynx is clear and moist.   Eyes: Pupils are equal, round, and reactive to light. Conjunctivae and EOM are normal.   Neck: No JVD present. No thyromegaly present.   Cardiovascular: Regular rhythm and normal heart sounds. Exam reveals no gallop and no friction rub.   No murmur heard.  Regular tachycardia   Pulmonary/Chest: No respiratory distress. He has no wheezes. He has no rales.   Mild tachypnea.  Breathing regularly on continuous BiPAP.  Crackles bilaterally in the bases with reduced breath sounds upper lung on the right.   Abdominal: Soft. Bowel sounds are normal. He exhibits no distension. There is no tenderness. There is no rebound and no guarding.   Musculoskeletal: Normal range of motion. He exhibits no edema or tenderness.   No peripheral edema.   Lymphadenopathy:     He has no cervical adenopathy.   Neurological: He is alert and oriented to person, place, and time. He has normal reflexes. He displays normal reflexes. No cranial nerve deficit.   Skin: Skin is warm and dry. No rash noted. He is not diaphoretic.  No erythema.   Psychiatric: He has a normal mood and affect. His behavior is normal. Judgment and thought content normal.       Vents:  Oxygen Concentration (%): 40 (05/20/20 1552)    Lines/Drains/Airways     Peripheral Intravenous Line                 Peripheral IV - Single Lumen 05/20/20 0810 20 G Left Antecubital less than 1 day                Significant Labs:    CBC/Anemia Profile:  Recent Labs   Lab 05/20/20  0811   WBC 13.83*   HGB 15.5   HCT 46.7      MCV 97   RDW 18.4*        Chemistries:  Recent Labs   Lab 05/20/20  0811      K 4.4      CO2 16*   BUN 20   CREATININE 2.1*   CALCIUM 9.0   ALBUMIN 3.5   PROT 7.8   BILITOT 0.4   ALKPHOS 171*   ALT 10   AST 14       All pertinent labs within the past 24 hours have been reviewed.    Significant Imaging:   I have reviewed all pertinent imaging results/findings within the past 24 hours.    Assessment/Plan:     Pulmonary  COPD (chronic obstructive pulmonary disease)  Does not appear to be in exacerbation.  Duonebs as needed.    Respiratory failure, acute  Due to decompensated heart failure.  Continuous BiPAP to maintain ventilation.    Cardiac/Vascular  * Acute combined systolic and diastolic CHF, NYHA class 4  Patient reports no known chronic heart disease.  Cardiac echo showed severe systolic dysfunction.  Furosemide IV 40 mg every 12 hours.  Strict I&O.  Currently NPO while on continuous BiPAP.  When appropriate start Cardiac Diet with Sodium and Fluid restriction.    Abnormal ECG  Acute coronary syndrome vs decompensated heart failure.  Cardiology assisting with management.  Continue to monitor.    Essential hypertension  Evaluate when hemodynamically more stable.    Elevated troponin  Due to decompensated heart failure vs. Ischemic heart disease.  Continue to trend cardiac enzymes.  Cardiology evaluated and assisting with management.  Starting Heparin infusion.    Renal/  Lactic acidosis  Due to systemic hypoperfusion and hypoxia.   Optimizing cardiac medication.  Trend lactic acid.        Critical Care Daily Checklist:    A: Awake: RASS Goal/Actual Goal:    Actual:     B: Spontaneous Breathing Trial Performed?     C: SAT & SBT Coordinated?  N/A                      D: Delirium: CAM-ICU     E: Early Mobility Performed? Yes   F: Feeding Goal:    Status:     Current Diet Order   No orders of the defined types were placed in this encounter.      AS: Analgesia/Sedation N/A   T: Thromboembolic Prophylaxis Heparin infusion   H: HOB > 300 Yes   U: Stress Ulcer Prophylaxis (if needed) Famotidine   G: Glucose Control N/A   B: Bowel Function     I: Indwelling Catheter (Lines & Ac) Necessity Yes   D: De-escalation of Antimicrobials/Pharmacotherapies N/A    Plan for the day/ETD Yes    Code Status:  Family/Goals of Care: Full Code  Yes     Critical Care Time: 45 minutes  Critical secondary to Patient has a condition that poses threat to life and bodily function: Severe Respiratory Distress     Critical care was time spent personally by me on the following activities: development of treatment plan with patient or surrogate and bedside caregivers, discussions with consultants, evaluation of patient's response to treatment, examination of patient, ordering and performing treatments and interventions, ordering and review of laboratory studies, ordering and review of radiographic studies, pulse oximetry, re-evaluation of patient's condition. This critical care time did not overlap with that of any other provider or involve time for any procedures.     Leonel Mccartney MD  Critical Care Medicine  [facility]

## 2020-05-21 NOTE — HPI
Came to the ED today for worsening shortness of breath.  Started Monday and is getting worse with severe episodes.  Exertion makes it worse and resting and laying down makes it better.  He denies chest pain.  He did have several episodes of light headedness without loss of consciousness.  Denies any other recent illness.  He was in his normal state of health until Monday.     5/22   61 y/o with 40 pack year history of smoking presented with 4-5 day history of paroxysmal nocturnal dyspnea, shortness of breath and chest tightness. Stopped his cardiac and blood pressure medications prior to admission. Treated for Congestive Heart failure, acute hypoxemic respiratory failure,  Non ST segment myocardial infarction - with nitrates, diuretics  and transferred to floor.

## 2020-05-21 NOTE — PLAN OF CARE
Pt received from ED on heparin gtt and nasal canula. ST 100s. BP stable. No chest pain. A little short of breath but pt not requiring BiPAP. Afebrile. Voids per urinal. JARAMILLO. Turns self. Call light in reach.

## 2020-05-21 NOTE — SUBJECTIVE & OBJECTIVE
Interval History:   Seen and examined at bedside. Hospital chart reviewed. No acute interval detrimental events noted. He reports that he  has significantly improved. Off BIPAP. Clinically and hemodynamically stable. Denies any chest pain.    Review of Systems   Constitutional: Negative.  Negative for chills and fever.   HENT: Negative.  Negative for congestion and sore throat.    Eyes: Negative.  Negative for visual disturbance.   Respiratory: Positive for shortness of breath. Negative for cough and wheezing.    Cardiovascular: Negative.  Negative for chest pain.   Gastrointestinal: Negative for abdominal pain, diarrhea, nausea and vomiting.   Endocrine: Negative.    Genitourinary: Negative.    Musculoskeletal: Negative.  Negative for myalgias and neck stiffness.   Skin: Negative.  Negative for color change and pallor.   Allergic/Immunologic: Negative.    Neurological: Negative.    Hematological: Negative.    Psychiatric/Behavioral: Negative.    All other systems reviewed and are negative.      Scheduled Meds:   aspirin  81 mg Oral Daily    furosemide (LASIX) IV  40 mg Intravenous Q12H    nicotine  1 patch Transdermal Daily     Continuous Infusions:   heparin (porcine) in D5W 14.962 Units/kg/hr (05/21/20 0600)     PRN Meds:.acetaminophen, albuterol-ipratropium, heparin (PORCINE), heparin (PORCINE), sodium chloride 0.9%    Objective:     Vital Signs (Most Recent):  Temp: 98 °F (36.7 °C) (05/21/20 0730)  Pulse: (!) 111 (05/21/20 0900)  Resp: (!) 22 (05/21/20 0900)  BP: (!) 160/97 (05/21/20 0900)  SpO2: 95 % (05/21/20 0900) Vital Signs (24h Range):  Temp:  [98 °F (36.7 °C)-98.7 °F (37.1 °C)] 98 °F (36.7 °C)  Pulse:  [] 111  Resp:  [16-44] 22  SpO2:  [90 %-99 %] 95 %  BP: (116-174)/() 160/97     Weight: 86.1 kg (189 lb 13.1 oz)  Body mass index is 28.03 kg/m².      Intake/Output Summary (Last 24 hours) at 5/21/2020 1026  Last data filed at 5/21/2020 0600  Gross per 24 hour   Intake 99.46 ml   Output  2210 ml   Net -2110.54 ml       Physical Exam   Constitutional: He is oriented to person, place, and time. He appears well-developed and well-nourished. No distress.   HENT:   Head: Normocephalic and atraumatic.   Mouth/Throat: Oropharynx is clear and moist.   Eyes: Pupils are equal, round, and reactive to light. Conjunctivae and EOM are normal.   Neck: No JVD present. No thyromegaly present.   Cardiovascular: Regular rhythm and normal heart sounds. Exam reveals no gallop and no friction rub.   No murmur heard.  Regular tachycardia   Pulmonary/Chest: No respiratory distress. He has no wheezes. He has no rales.   Mild tachypnea.  Breathing regularly on continuous BiPAP.  Crackles bilaterally in the bases with reduced breath sounds upper lung on the right.   Abdominal: Soft. Bowel sounds are normal. He exhibits no distension. There is no tenderness. There is no rebound and no guarding.   Musculoskeletal: Normal range of motion. He exhibits no edema or tenderness.   No peripheral edema.   Lymphadenopathy:     He has no cervical adenopathy.   Neurological: He is alert and oriented to person, place, and time. He has normal reflexes. He displays normal reflexes. No cranial nerve deficit.   Skin: Skin is warm and dry. No rash noted. He is not diaphoretic. No erythema.   Psychiatric: He has a normal mood and affect. His behavior is normal. Judgment and thought content normal.       Vents:  Oxygen Concentration (%): 3 (05/21/20 0828)    Lines/Drains/Airways     Peripheral Intravenous Line                 Peripheral IV - Single Lumen 05/20/20 0810 20 G Left Antecubital 1 day         Peripheral IV - Single Lumen 05/20/20 2240 20 G Left Hand less than 1 day                Significant Labs:    CBC/Anemia Profile:  Recent Labs   Lab 05/20/20  0811 05/20/20  2240 05/21/20  0355   WBC 13.83* 13.80* 16.00*   HGB 15.5 14.2 14.3   HCT 46.7 40.7 42.2    182 180   MCV 97 93 94   RDW 18.4* 17.5* 17.6*   FERRITIN 134  --   --          Chemistries:  Recent Labs   Lab 05/20/20  0811 05/20/20  2240 05/21/20  0355     --  140   K 4.4  --  4.4     --  107   CO2 16*  --  18*   BUN 20  --  31*   CREATININE 2.1*  --  2.0*   CALCIUM 9.0  --  8.8   ALBUMIN 3.5  --  3.2*   PROT 7.8  --   --    BILITOT 0.4  --   --    ALKPHOS 171*  --   --    ALT 10  --   --    AST 14  --   --    MG  --  1.9  --    PHOS  --  4.0 4.0       ABGs:   Recent Labs   Lab 05/20/20  0827   PH 7.185*   PCO2 46.7*   HCO3 17.6*   POCSATURATED 93*   BE -11     Bilirubin:   Recent Labs   Lab 05/20/20  0811   BILITOT 0.4     Blood Culture:   Recent Labs   Lab 05/20/20  0811 05/20/20  0814   LABBLOO No Growth to date No Growth to date     Coagulation:   Recent Labs   Lab 05/20/20  2240 05/21/20  0355   INR 0.9  --    APTT 27.6 27.4     Lactic Acid:   Recent Labs   Lab 05/20/20  0811 05/20/20  2240   LACTATE 6.3* 2.3*     Troponin:   Recent Labs   Lab 05/20/20  1520 05/20/20  2240 05/21/20  0840   TROPONINI 1.180* 2.686* 2.573*       Significant Imaging:    XR CHEST AP PORTABLE: 05/20/20:    Support devices: None    Right apical emphysematous bleb.  Moderate severity upper lobe predominant centrilobular emphysema.    Diffuse hazy alveolar and interstitial opacification compatible with pulmonary edema.  Mild cardiomegaly.  No pleural effusion.    Bones are intact.

## 2020-05-21 NOTE — HPI
Pt is a 59 yo male with PMhx of COPD and HTN who was admitted to ICU in early AM of 5/21/2020 for Acute Respiratory Failure requiring BIPAP and decompensated combined heart failure. Pt reported WINSTON and lightheadedness. COVID 19 negative. Troponins were elevated 1.180 > 2.686 > 2.573 and Cardiology recommended heparin infusion. Pt was in acute CHF exacerbation and 2 d ECHO showed LVEF 20 % and diastolic dysfunction. Diuresis with IV lasix given. Initially, pt required supplemental oxygen via NIPPV which has thus been weaned. Also, pt received Plavix, BB, ACE inhibitor, ASA and STATIN. Pt's respiratory status improved and he was downgraded to Telemetry unit.

## 2020-05-21 NOTE — PLAN OF CARE
05/21/20 1238   Discharge Assessment   Assessment Type Discharge Planning Assessment   Confirmed/corrected address and phone number on facesheet? Yes   Assessment information obtained from? Patient   Prior to hospitilization cognitive status: Alert/Oriented   Prior to hospitalization functional status: Independent   Current cognitive status: Alert/Oriented   Current Functional Status: Independent   Lives With sibling(s);parent(s)   Able to Return to Prior Arrangements yes   Is patient able to care for self after discharge? Yes   Who are your caregiver(s) and their phone number(s)? Lilian Bearden (sister) [phone]   Patient's perception of discharge disposition home or selfcare   Readmission Within the Last 30 Days no previous admission in last 30 days   Patient currently being followed by outpatient case management? No   Patient currently receives any other outside agency services? No   Equipment Currently Used at Home none   Do you have any problems affording any of your prescribed medications? No   Is the patient taking medications as prescribed? yes   Does the patient have transportation home? Yes   Transportation Anticipated family or friend will provide   Does the patient receive services at the [facility]? No   Discharge Plan A Home with family   Patient/Family in Agreement with Plan yes     Spoke with pt at bedside for DC assessment. Pt lives with his sister and mother and does not use any DME. No CM DC needs identified at this time. SWer provided a transitional care folder, information on advanced directives, information on pharmacy bedside delivery, and discharge planning begins on admission with contact information for any needs/questions. Pt opted out of bedside medication delivery. Pt typically uses the [facility] on [address] Information below.    [address], [place] [address]  [phone]  Martin Adorno LMSW 5/21/2020 12:41 PM

## 2020-05-21 NOTE — PROGRESS NOTES
[facility] -   Critical Care Medicine  Progress Note    Patient Name: Ceasar Hernandez  MRN: [id number]  Admission Date: 5/20/2020  Hospital Length of Stay: 1 days  Code Status: Full Code  Attending Provider: Scott Eng MD  Primary Care Provider: Provider [facility]   Principal Problem: Acute combined systolic and diastolic CHF, NYHA class 4    Subjective:     HPI:  Came to the ED today for worsening shortness of breath.  Started Monday and is getting worse with severe episodes.  Exertion makes it worse and resting and laying down makes it better.  He denies chest pain.  He did have several episodes of light headedness without loss of consciousness.  Denies any other recent illness.  He was in his normal state of health until Monday.     Hospital/ICU Course:  05/21: Seen and examined at bedside. Hospital chart reviewed. No acute interval detrimental events noted. He reports that he  has significantly improved. Off BIPAP. Clinically and hemodynamically stable.         Interval History:   Seen and examined at bedside. Hospital chart reviewed. No acute interval detrimental events noted. He reports that he  has significantly improved. Off BIPAP. Clinically and hemodynamically stable. Denies any chest pain.    Review of Systems   Constitutional: Negative.  Negative for chills and fever.   HENT: Negative.  Negative for congestion and sore throat.    Eyes: Negative.  Negative for visual disturbance.   Respiratory: Positive for shortness of breath. Negative for cough and wheezing.    Cardiovascular: Negative.  Negative for chest pain.   Gastrointestinal: Negative for abdominal pain, diarrhea, nausea and vomiting.   Endocrine: Negative.    Genitourinary: Negative.    Musculoskeletal: Negative.  Negative for myalgias and neck stiffness.   Skin: Negative.  Negative for color change and pallor.   Allergic/Immunologic: Negative.    Neurological: Negative.    Hematological: Negative.    Psychiatric/Behavioral:  Negative.    All other systems reviewed and are negative.      Scheduled Meds:   aspirin  81 mg Oral Daily    furosemide (LASIX) IV  40 mg Intravenous Q12H    nicotine  1 patch Transdermal Daily     Continuous Infusions:   heparin (porcine) in D5W 14.962 Units/kg/hr (05/21/20 0600)     PRN Meds:.acetaminophen, albuterol-ipratropium, heparin (PORCINE), heparin (PORCINE), sodium chloride 0.9%    Objective:     Vital Signs (Most Recent):  Temp: 98 °F (36.7 °C) (05/21/20 0730)  Pulse: (!) 111 (05/21/20 0900)  Resp: (!) 22 (05/21/20 0900)  BP: (!) 160/97 (05/21/20 0900)  SpO2: 95 % (05/21/20 0900) Vital Signs (24h Range):  Temp:  [98 °F (36.7 °C)-98.7 °F (37.1 °C)] 98 °F (36.7 °C)  Pulse:  [] 111  Resp:  [16-44] 22  SpO2:  [90 %-99 %] 95 %  BP: (116-174)/() 160/97     Weight: 86.1 kg (189 lb 13.1 oz)  Body mass index is 28.03 kg/m².      Intake/Output Summary (Last 24 hours) at 5/21/2020 1026  Last data filed at 5/21/2020 0600  Gross per 24 hour   Intake 99.46 ml   Output 2210 ml   Net -2110.54 ml       Physical Exam   Constitutional: He is oriented to person, place, and time. He appears well-developed and well-nourished. No distress.   HENT:   Head: Normocephalic and atraumatic.   Mouth/Throat: Oropharynx is clear and moist.   Eyes: Pupils are equal, round, and reactive to light. Conjunctivae and EOM are normal.   Neck: No JVD present. No thyromegaly present.   Cardiovascular: Regular rhythm and normal heart sounds. Exam reveals no gallop and no friction rub.   No murmur heard.  Regular tachycardia   Pulmonary/Chest: No respiratory distress. He has no wheezes. He has no rales.   Mild tachypnea.  Breathing regularly on continuous BiPAP.  Crackles bilaterally in the bases with reduced breath sounds upper lung on the right.   Abdominal: Soft. Bowel sounds are normal. He exhibits no distension. There is no tenderness. There is no rebound and no guarding.   Musculoskeletal: Normal range of motion. He exhibits  no edema or tenderness.   No peripheral edema.   Lymphadenopathy:     He has no cervical adenopathy.   Neurological: He is alert and oriented to person, place, and time. He has normal reflexes. He displays normal reflexes. No cranial nerve deficit.   Skin: Skin is warm and dry. No rash noted. He is not diaphoretic. No erythema.   Psychiatric: He has a normal mood and affect. His behavior is normal. Judgment and thought content normal.       Vents:  Oxygen Concentration (%): 3 (05/21/20 0828)    Lines/Drains/Airways     Peripheral Intravenous Line                 Peripheral IV - Single Lumen 05/20/20 0810 20 G Left Antecubital 1 day         Peripheral IV - Single Lumen 05/20/20 2240 20 G Left Hand less than 1 day                Significant Labs:    CBC/Anemia Profile:  Recent Labs   Lab 05/20/20 0811 05/20/20 2240 05/21/20  0355   WBC 13.83* 13.80* 16.00*   HGB 15.5 14.2 14.3   HCT 46.7 40.7 42.2    182 180   MCV 97 93 94   RDW 18.4* 17.5* 17.6*   FERRITIN 134  --   --         Chemistries:  Recent Labs   Lab 05/20/20 0811 05/20/20 2240 05/21/20  0355     --  140   K 4.4  --  4.4     --  107   CO2 16*  --  18*   BUN 20  --  31*   CREATININE 2.1*  --  2.0*   CALCIUM 9.0  --  8.8   ALBUMIN 3.5  --  3.2*   PROT 7.8  --   --    BILITOT 0.4  --   --    ALKPHOS 171*  --   --    ALT 10  --   --    AST 14  --   --    MG  --  1.9  --    PHOS  --  4.0 4.0       ABGs:   Recent Labs   Lab 05/20/20  0827   PH 7.185*   PCO2 46.7*   HCO3 17.6*   POCSATURATED 93*   BE -11     Bilirubin:   Recent Labs   Lab 05/20/20  0811   BILITOT 0.4     Blood Culture:   Recent Labs   Lab 05/20/20 0811 05/20/20  0814   LABBLOO No Growth to date No Growth to date     Coagulation:   Recent Labs   Lab 05/20/20 2240 05/21/20  0355   INR 0.9  --    APTT 27.6 27.4     Lactic Acid:   Recent Labs   Lab 05/20/20  0811 05/20/20 2240   LACTATE 6.3* 2.3*     Troponin:   Recent Labs   Lab 05/20/20  1520 05/20/20  2240 05/21/20  0840    TROPONINI 1.180* 2.686* 2.573*       Significant Imaging:    XR CHEST AP PORTABLE: 05/20/20:    Support devices: None    Right apical emphysematous bleb.  Moderate severity upper lobe predominant centrilobular emphysema.    Diffuse hazy alveolar and interstitial opacification compatible with pulmonary edema.  Mild cardiomegaly.  No pleural effusion.    Bones are intact.        Assessment/Plan:       I have reviewed all labs and imaging studies and compared to previous results. I have also discussed labs with all the teams in the medical care of the patient and my plan is outlined below     Neuro  Neurochecks per routine.    Pulmonary  COPD (chronic obstructive pulmonary disease)  Does not appear to be in exacerbation.    Supplement oxygenation. Keep SAO2 >= 92%.                       Continue with LABA, LUCERO as needed        Continue nighttime BiPAP 10 / 5 and as needed.                                             Cardiac/Vascular  * Acute combined systolic and diastolic CHF, NYHA class 4  Patient reports no known chronic heart disease.  Cardiac echo showed severe systolic dysfunction - EF 20%.      Cardiology consulted and assisting with management.   Appreciate input.     Further management per cardiology  Optimize CHF regimen.  Preload and afterload reduction.[Furosemide IV 40 mg every 12 hours]  Input and Output monitoring  Daily weighing.  Dietary salt restriction.  Alcohol and tobacco avoidance.      Essential hypertension  Monitor and trend.     Elevated troponin  Cardiology consulted and assisting with management.   Appreciate input.     Due to decompensated heart failure vs. Ischemic heart disease.    Continue to trend cardiac enzymes.    Continue Heparin infusion.       Continue current medical therapy and  risk factor modification           [x]        Weight management is recommended by the AHA for the secondary prevention  [x]        Treatment of High Blood Pressure   (goal of < 140/90 mm Hg // <  130/80 mm Hg in patients with DM or CKD)  []        Beta-blocker therapy.  [x]        Aspirin therapy (81 to 162 mg daily)   [x]        Statin therapy.    [x]        ACE inhibitor therapy.  [x]        Antiplatelet Agent therapy.  [x]        Influenza Vaccination.      Abnormal ECG  Acute coronary syndrome vs decompensated heart failure.  Cardiology assisting with management.  Continue to monitor.    Renal/  Lactic acidosis  Due to systemic hypoperfusion and hypoxia.  Optimizing hemodynamics.  Trend lactic acid.    ID  Monitor fever curve. panculture for temperatures greater than 101 degrees F. Source control: n/a    Immunology/Multi System  Immunization status reviewed and up to date.    Hematology  Monitor hemogram. Transfuse as needed.    GI  Adult cardiac diet. Stress ulcer prophylaxis.    Other  Smoking history  Smoking cessation counseling.   NRT with Nicotine Transdermal Patch.     Critical Care Daily Checklist:    A: Awake: RASS Goal/Actual Goal:   0  alert and calm  Actual: Antonio Agitation Sedation Scale (RASS): Alert and calm   B: Spontaneous Breathing Trial Performed?  N/A   C: SAT & SBT Coordinated?  N/A                   D: Delirium: CAM-ICU Overall CAM-ICU: Negative   E: Early Mobility Performed? Yes   F: Feeding Goal:    Status:     Current Diet Order   Procedures    Diet Cardiac      AS: Analgesia/Sedation PRN   T: Thromboembolic Prophylaxis IV HEPARIN   H: HOB > 300 Yes   U: Stress Ulcer Prophylaxis (if needed) FAMOTIDINE   G: Glucose Control SBGM   B: Bowel Function     I: Indwelling Catheter (Lines & Ac) Necessity Lines/Drains:       Peripheral IV - Single Lumen 05/20/20 0810 20 G Left Antecubital (Active)   Site Assessment Clean;Dry;Intact 5/21/2020  7:15 AM   Line Status Flushed 5/21/2020  7:15 AM   Dressing Status Clean;Dry;Intact 5/21/2020  7:15 AM   Dressing Intervention Integrity maintained 5/21/2020  7:15 AM   Dressing Change Due 05/24/20 5/21/2020  7:15 AM   Site Change Due  05/24/20 5/21/2020  7:15 AM   Number of days: 1            Peripheral IV - Single Lumen 05/20/20 2240 20 G Left Hand (Active)   Site Assessment Clean;Dry;Intact 5/21/2020  7:15 AM   Line Status Flushed;Infusing 5/21/2020  7:15 AM   Dressing Status Clean;Dry;Intact 5/21/2020  7:15 AM   Dressing Intervention Integrity maintained 5/21/2020  7:15 AM   Dressing Change Due 05/24/20 5/21/2020  7:15 AM   Site Change Due 05/24/20 5/21/2020  7:15 AM   Number of days: 0        D: De-escalation of Antimicrobials/Pharmacotherapies N/A    Plan for the day/ETD CONTINUE CURRENT.  OK TO TRANSFER PATIENT TO TELEMETRY    Code Status:  Family/Goals of Care: Full Code  HOME     Critical Care Time: 45 minutes    Critical secondary to Patient has a condition that poses threat to life and bodily function: CHF, ELEVATED TROPONIN, ACUTE HYPOXEMIC RESPIRATORY FAILURE.       Critical care was time spent personally by me on the following activities: development of treatment plan with patient or surrogate and bedside caregivers, discussions with consultants, evaluation of patient's response to treatment, examination of patient, ordering and performing treatments and interventions, ordering and review of laboratory studies, ordering and review of radiographic studies, pulse oximetry, re-evaluation of patient's condition. This critical care time did not overlap with that of any other provider or involve time for any procedures.     Scott Eng MD  Critical Care Medicine  [facility] -

## 2020-05-21 NOTE — PROGRESS NOTES
[facility] -   Cardiology  Progress Note    Patient Name: Ceasar Hernandez  MRN: [id number]  Admission Date: 5/20/2020  Hospital Length of Stay: 1 days  Code Status: Full Code   Attending Physician: Scott Eng MD   Primary Care Physician: Provider Notinsystem  Expected Discharge Date:   Principal Problem:Acute combined systolic and diastolic CHF, NYHA class 4    Subjective:     HPI:  Pt presents with acute respiratory failure.  Per ER chart, suspicious for Covid 19.  Cardiology consult performed based on communication from ER physician and chart review.  He has h/o COPD.  Pt presented to ER with c/o dyspnea since yesterday with associated wheezing.  Per chart no cp sxs.  ECG in ER showed possible SVT.  Adenosine administered and f/u ecgs looked more like sinus tachycardia.  BP very high on arrival 217/109.  Given Labetalol in ER.    BNP 1194  Troponin 0.09  Lactic acid +  ecg with inferolateral st-t abnl.  No old records to compare/review.    Hospital Course:   5/21/20:  PT SEEN AND EXAMINED IN ICU.  HE FELT REMARKABLY IMPROVED, ON NASAL CANNULA.  DENIES CHEST PAIN SXS.  DYSPNEA MUCH IMPROVED.  TROPONIN PEAK NEAR 2.6  BP HAS BEEN VERY HIGH. LACTIC ACID NORMAL NOW.  CREATININE STABLE.        Review of Systems   Constitution: Positive for diaphoresis and malaise/fatigue.   HENT: Negative.    Eyes: Negative.    Cardiovascular: Positive for dyspnea on exertion.   Respiratory: Positive for cough and shortness of breath.    Endocrine: Negative.    Hematologic/Lymphatic: Negative.    Skin: Negative.    Musculoskeletal: Negative.    Gastrointestinal: Negative.    Genitourinary: Negative.    Neurological: Negative.    Psychiatric/Behavioral: Negative.    Allergic/Immunologic: Negative.      Objective:     Vital Signs (Most Recent):  Temp: 98.1 °F (36.7 °C) (05/21/20 1100)  Pulse: (!) 115 (05/21/20 1300)  Resp: (!) 23 (05/21/20 1200)  BP: 131/83 (05/21/20 1300)  SpO2: 97 % (05/21/20 1300) Vital Signs (24h  Range):  Temp:  [98 °F (36.7 °C)-98.7 °F (37.1 °C)] 98.1 °F (36.7 °C)  Pulse:  [] 115  Resp:  [16-35] 23  SpO2:  [94 %-99 %] 97 %  BP: (116-174)/() 131/83     Weight: 86.1 kg (189 lb 13.1 oz)  Body mass index is 28.03 kg/m².     SpO2: 97 %  O2 Device (Oxygen Therapy): nasal cannula      Intake/Output Summary (Last 24 hours) at 5/21/2020 1403  Last data filed at 5/21/2020 1115  Gross per 24 hour   Intake 757.96 ml   Output 2390 ml   Net -1632.04 ml       Lines/Drains/Airways     Peripheral Intravenous Line                 Peripheral IV - Single Lumen 05/20/20 0810 20 G Left Antecubital 1 day         Peripheral IV - Single Lumen 05/20/20 2240 20 G Left Hand less than 1 day                Physical Exam   Constitutional: He is oriented to person, place, and time. He appears well-developed and well-nourished.   HENT:   Head: Normocephalic.   Neck: Normal range of motion. Neck supple. Normal carotid pulses, no hepatojugular reflux and no JVD present. Carotid bruit is not present. No thyromegaly present.   Cardiovascular: Normal rate, regular rhythm, S1 normal and S2 normal. PMI is not displaced. Exam reveals no S3, no S4, no distant heart sounds, no friction rub, no midsystolic click and no opening snap.   No murmur heard.  Pulses:       Radial pulses are 2+ on the right side, and 2+ on the left side.   Pulmonary/Chest: Effort normal. He has decreased breath sounds. He has no wheezes. He has no rales.   Abdominal: Soft. Bowel sounds are normal. He exhibits no distension, no abdominal bruit, no ascites and no mass. There is no tenderness.   Musculoskeletal: He exhibits no edema.   Neurological: He is alert and oriented to person, place, and time.   Skin: Skin is warm.   Psychiatric: He has a normal mood and affect. His behavior is normal.   Nursing note and vitals reviewed.      Significant Labs:   BMP:   Recent Labs   Lab 05/20/20  0811 05/20/20  2240 05/21/20  0355   *  --  151*     --  140   K  4.4  --  4.4     --  107   CO2 16*  --  18*   BUN 20  --  31*   CREATININE 2.1*  --  2.0*   CALCIUM 9.0  --  8.8   MG  --  1.9  --    , CMP   Recent Labs   Lab 05/20/20  0811 05/21/20  0355    140   K 4.4 4.4    107   CO2 16* 18*   * 151*   BUN 20 31*   CREATININE 2.1* 2.0*   CALCIUM 9.0 8.8   PROT 7.8  --    ALBUMIN 3.5 3.2*   BILITOT 0.4  --    ALKPHOS 171*  --    AST 14  --    ALT 10  --    ANIONGAP 18* 15   ESTGFRAFRICA 38* 40*   EGFRNONAA 33* 35*   , CBC   Recent Labs   Lab 05/20/20  0811 05/20/20  2240 05/21/20  0355   WBC 13.83* 13.80* 16.00*   HGB 15.5 14.2 14.3   HCT 46.7 40.7 42.2    182 180   , INR   Recent Labs   Lab 05/20/20  2240   INR 0.9    and Troponin   Recent Labs   Lab 05/20/20  1520 05/20/20  2240 05/21/20  0840   TROPONINI 1.180* 2.686* 2.573*       Significant Imaging: Echocardiogram:   Transthoracic echo (TTE) complete (Cupid Only):   Results for orders placed or performed during the hospital encounter of 05/20/20   Echo Color Flow Doppler? Yes   Result Value Ref Range    Ascending aorta 2.81 cm    STJ 3.00 cm    AV mean gradient 2 mmHg    Ao peak papi 0.90 m/s    Ao VTI 14.90 cm    IVRT 51.38 msec    IVS 1.64 (A) 0.6 - 1.1 cm    LA size 3.46 cm    Left Atrium Major Axis 3.42 cm    Left Atrium Minor Axis 2.79 cm    LVIDD 4.49 3.5 - 6.0 cm    LVIDS 3.85 2.1 - 4.0 cm    LVOT diameter 2.06 cm    LVOT peak VTI 10.76 cm    PW 1.74 (A) 0.6 - 1.1 cm    MV Peak A Papi 0.26 m/s    E wave decelartion time 146.42 msec    MV Peak E Papi 0.87 m/s    RA Major Axis 3.05 cm    Sinus 3.29 cm    TAPSE 1.39 cm    TR Max Papi 3.01 m/s    TDI LATERAL 0.07 m/s    TDI SEPTAL 0.04 m/s    LA WIDTH 3.73 cm    Ao root annulus 3.60 cm    LV Diastolic Volume 92.19 mL    LV Systolic Volume 63.94 mL    LVOT peak papi 0.60 m/s    LV LATERAL E/E' RATIO 12.43 m/s    LV SEPTAL E/E' RATIO 21.75 m/s    FS 14 %    LA volume 33.71 cm3    LV mass 330.84 g    Left Ventricle Relative Wall Thickness 0.78 cm     AV valve area 2.41 cm2    AV Velocity Ratio 0.67     AV index (prosthetic) 0.72     E/A ratio 3.35     Mean e' 0.06 m/s    LVOT area 3.3 cm2    LVOT stroke volume 35.84 cm3    AV peak gradient 3 mmHg    E/E' ratio 15.82 m/s    LV Systolic Volume Index 31.2 mL/m2    LV Diastolic Volume Index 44.92 mL/m2    LA Volume Index 16.4 mL/m2    LV Mass Index 161 g/m2    Triscuspid Valve Regurgitation Peak Gradient 36 mmHg    BSA 2.09 m2    Narrative    · Severely decreased left ventricular systolic function. The estimated   ejection fraction is 20%.  · Grade III (severe) left ventricular diastolic dysfunction consistent   with restrictive physiology.  · Normal right ventricular systolic function.  · Concentric left ventricular hypertrophy.        Assessment and Plan:       ACUTE CHF EXACERBATION IN SETTING OF MARKEDLY ELEVATED BP.  NO PRIOR H/O CV DISEASE/CHF.  NEEDS OPTIMIZATION OF MED TX FOR HTN CONTROL AND FOR CHF.  TROPONIN ELEVATION MAY BE DUE TO UNCONTROLLED HTN, CHF -- NO CHEST PAIN SXS.  CONTINUE IV HEPARIN GTT.  ADD COREG.  ADD STATIN.  ADD ASA, PLAVIX.  IV LASIX 40 MG BID.  F/U LABS IN AM.  WILL NEED ISCHEMIC WORKUP PRIOR TO DISCHARGE.      * Acute combined systolic and diastolic CHF, NYHA class 4  See management plan detailed above.     Lactic acidosis  See management plan detailed above.     COPD (chronic obstructive pulmonary disease)  Per hospital medicine, critical care mgt.    Essential hypertension  HTN control.    Elevated troponin  See management plan detailed above.     Abnormal ECG  See management plan detailed above.           VTE Risk Mitigation (From admission, onward)         Ordered     heparin 25,000 units in dextrose 5% 250 mL (100 units/mL) infusion LOW INTENSITY nomogram - [facility]  Continuous     Question:  Heparin Infusion Adjustment (DO NOT MODIFY ANSWER)  Answer:  [email or web address]\epic\Images\Pharmacy\HeparinInfusions\heparin LOW INTENSITY nomogram for [facility] [id number]    05/20/20 4971     heparin  25,000 units in dextrose 5% (100 units/ml) IV bolus from bag - ADDITIONAL PRN BOLUS - 60 units/kg (max bolus 4000 units)  As needed (PRN)     Question:  Heparin Infusion Adjustment (DO NOT MODIFY ANSWER)  Answer:  \\[email or web address]\epic\Images\Pharmacy\HeparinInfusions\heparin LOW INTENSITY nomogram for [facility] [id number].pdf    05/20/20 2103     heparin 25,000 units in dextrose 5% (100 units/ml) IV bolus from bag - ADDITIONAL PRN BOLUS - 30 units/kg (max bolus 4000 units)  As needed (PRN)     Question:  Heparin Infusion Adjustment (DO NOT MODIFY ANSWER)  Answer:  \[email or web address]\epic\Images\Pharmacy\HeparinInfusions\heparin LOW INTENSITY nomogram for [facility] [id number].pdf    05/20/20 2103     IP VTE HIGH RISK PATIENT  Once      05/20/20 2058     Place sequential compression device  Until discontinued      05/20/20 2058                Rom Hall MD  Cardiology  [facility] -

## 2020-05-21 NOTE — ED NOTES
PT report rec'd.  Lying in bed in NAD, BiPAP in progress.  CM shows ST @ 105 with no ectopy.  AAOx3.  BBS mildly coarse in lower lobes and diminished in upper.  States feeling much better.

## 2020-05-21 NOTE — ASSESSMENT & PLAN NOTE
WBC 13.83 > 13.80 > 16.00  Blood cultures negative  CXR without signs of infection  Did receive Solumedrol in the ED which could cause increasing level

## 2020-05-21 NOTE — ASSESSMENT & PLAN NOTE
Patient reports no known chronic heart disease.  Cardiac echo showed severe systolic dysfunction.  Furosemide IV 40 mg every 12 hours.  Strict I&O.  Currently NPO while on continuous BiPAP.  When appropriate start Cardiac Diet with Sodium and Fluid restriction.

## 2020-05-21 NOTE — CONSULTS
[facility] Hospital Medicine  Consult Note    Patient Name: Ceasar Hernandez  MRN: [id number]  Admission Date: 5/20/2020  Hospital Length of Stay: 1 days  Attending Physician: Sun Yen MD  Primary Care Provider: Provider Notinsystem           Patient information was obtained from patient, past medical records and ER records.     Consults  Subjective:     Principal Problem: Acute combined systolic and diastolic CHF, NYHA class 4    Chief Complaint:   Chief Complaint   Patient presents with    Shortness of Breath     pt presents to ED with shortness of breath         HPI: Pt is a 61 yo male with PMhx of COPD and HTN who was admitted to ICU in early AM of 5/21/2020 for Acute Respiratory Failure requiring BIPAP and decompensated combined heart failure. Pt reported WINSTON and lightheadedness. COVID 19 negative. Troponins were elevated 1.180 > 2.686 > 2.573 and Cardiology recommended heparin infusion. Pt was in acute CHF exacerbation and 2 d ECHO showed LVEF 20 % and diastolic dysfunction. Diuresis with IV lasix given. Initially, pt required supplemental oxygen via NIPPV which has thus been weaned. Also, pt received Plavix, BB, ACE inhibitor, ASA and STATIN. Pt's respiratory status improved and he was downgraded to Telemetry unit.     Past Medical History:   Diagnosis Date    COPD (chronic obstructive pulmonary disease)     Gout     Hypertension        Past Surgical History:   Procedure Laterality Date    FRACTURE SURGERY         Review of patient's allergies indicates:  No Known Allergies    No current facility-administered medications on file prior to encounter.      No current outpatient medications on file prior to encounter.     Family History     Reviewed and not pertinent        Tobacco Use    Smoking status: Former Smoker   Substance and Sexual Activity    Alcohol use: Not Currently    Drug use: Never    Sexual activity: Not on file     Review of Systems   Constitutional: Positive for  activity change. Negative for appetite change, chills, diaphoresis, fatigue and fever.   HENT: Negative.    Respiratory: Positive for cough and shortness of breath. Negative for wheezing.    Cardiovascular: Negative for chest pain, palpitations and leg swelling.   Gastrointestinal: Negative for abdominal pain, constipation, diarrhea and vomiting.   Genitourinary: Negative for difficulty urinating, flank pain and frequency.   Musculoskeletal: Negative for arthralgias, back pain and myalgias.   Skin: Negative for pallor, rash and wound.   Neurological: Positive for light-headedness.   Psychiatric/Behavioral: Negative.      Objective:     Vital Signs (Most Recent):  Temp: 98.1 °F (36.7 °C) (05/21/20 1100)  Pulse: (!) 115 (05/21/20 1300)  Resp: (!) 23 (05/21/20 1200)  BP: 131/83 (05/21/20 1300)  SpO2: 97 % (05/21/20 1300) Vital Signs (24h Range):  Temp:  [98 °F (36.7 °C)-98.5 °F (36.9 °C)] 98.1 °F (36.7 °C)  Pulse:  [] 115  Resp:  [16-35] 23  SpO2:  [94 %-99 %] 97 %  BP: (116-174)/() 131/83     Weight: 86.1 kg (189 lb 13.1 oz)  Body mass index is 28.03 kg/m².    Physical Exam   Constitutional: He is oriented to person, place, and time. He appears well-developed and well-nourished.   HENT:   Head: Normocephalic and atraumatic.   Nose: Nose normal.   Mouth/Throat: Oropharynx is clear and moist.   Eyes: Pupils are equal, round, and reactive to light. Conjunctivae are normal. No scleral icterus.   Neck: Normal range of motion. Neck supple.   Cardiovascular: Regular rhythm and normal heart sounds. Tachycardia present. Exam reveals no gallop and no friction rub.   No murmur heard.  Pulmonary/Chest: Effort normal. He has rales in the left lower field.   Abdominal: Soft. Bowel sounds are normal.   Musculoskeletal: Normal range of motion. He exhibits no edema or tenderness.   Neurological: He is alert and oriented to person, place, and time.   Skin: Skin is warm and dry.   Psychiatric: He has a normal mood and  affect. His behavior is normal.   Vitals reviewed.      Significant Labs:   CBC:   Recent Labs   Lab 05/20/20  0811 05/20/20  2240 05/21/20  0355   WBC 13.83* 13.80* 16.00*   HGB 15.5 14.2 14.3   HCT 46.7 40.7 42.2    182 180     CMP:   Recent Labs   Lab 05/20/20  0811 05/21/20  0355    140   K 4.4 4.4    107   CO2 16* 18*   * 151*   BUN 20 31*   CREATININE 2.1* 2.0*   CALCIUM 9.0 8.8   PROT 7.8  --    ALBUMIN 3.5 3.2*   BILITOT 0.4  --    ALKPHOS 171*  --    AST 14  --    ALT 10  --    ANIONGAP 18* 15   EGFRNONAA 33* 35*     All pertinent labs within the past 24 hours have been reviewed.    Significant Imaging: I have reviewed all pertinent imaging results/findings within the past 24 hours.    Assessment/Plan:     * Acute combined systolic and diastolic CHF, NYHA class 4  2 d ECHO  · Severely decreased left ventricular systolic function. The estimated ejection fraction is 20%.  · Grade III (severe) left ventricular diastolic dysfunction consistent with restrictive physiology.  · Normal right ventricular systolic function.  · Concentric left ventricular hypertrophy.    Telemetry monitoring, daily weights, cardiac diet, 1500 cc fluid restriction, Lasix 40 mg IV bid  Lisinopril  Strict I & O  BNP = 1197  Has diuresed close to 2 L fluid  Cardiology following             Leukocytosis  WBC 13.83 > 13.80 > 16.00  Blood cultures negative  CXR without signs of infection  Did receive Solumedrol in the ED which could cause increasing level      Lactic acidosis  6.3 >> 2.3 >> 1.8 thought secondary to hypoperfusion and hypoxia  Now resolved  Blood cultures NGTD  CXR no signs of infectious source      COPD (chronic obstructive pulmonary disease)  Not in exacerbation  Supplemental oxygen to maintain sats > 92 %  DuoNebs prn        Essential hypertension  Elevated upon admission  Fluctuating  Diuresis with IV lasix continued  On Coreg and lisinopril  Hydralazine 10 mg IV every 6 hours prn SBP >  170      Elevated troponin  Cardiology following  Heparin infusion in progress  Plavix, BB, ACE inhibitor, STATIN and ASA in progress  Possibly demand ischemia secondary to CHF, Sepsis or Aflutter vs sinus tach        VTE Risk Mitigation (From admission, onward)         Ordered     heparin 25,000 units in dextrose 5% 250 mL (100 units/mL) infusion LOW INTENSITY nomogram - [facility]  Continuous     Question:  Heparin Infusion Adjustment (DO NOT MODIFY ANSWER)  Answer:  [email or web address]\epic\Images\Pharmacy\HeparinInfusions\heparin LOW INTENSITY nomogram for [facility] [id number].pdf    05/20/20 2103     heparin 25,000 units in dextrose 5% (100 units/ml) IV bolus from bag - ADDITIONAL PRN BOLUS - 60 units/kg (max bolus 4000 units)  As needed (PRN)     Question:  Heparin Infusion Adjustment (DO NOT MODIFY ANSWER)  Answer:  \[email or web address]\epic\Images\Pharmacy\HeparinInfusions\heparin LOW INTENSITY nomogram for [facility] [id number].pdf    05/20/20 2103     heparin 25,000 units in dextrose 5% (100 units/ml) IV bolus from bag - ADDITIONAL PRN BOLUS - 30 units/kg (max bolus 4000 units)  As needed (PRN)     Question:  Heparin Infusion Adjustment (DO NOT MODIFY ANSWER)  Answer:  \[email or web address]\epic\Images\Pharmacy\HeparinInfusions\heparin LOW INTENSITY nomogram for [facility] [id number].pdf    05/20/20 2103     IP VTE HIGH RISK PATIENT  Once      05/20/20 2058     Place sequential compression device  Until discontinued      05/20/20 2058                Critical care time spent on the evaluation and treatment of severe organ dysfunction, review of pertinent labs and imaging studies, discussions with consulting providers and discussions with patient/family: > 30 minutes.    Thank you for your consult. I will follow-up with patient. Please contact us if you have any additional questions.    Farzana Bueno NP  Department of Hospital Medicine   [facility]

## 2020-05-21 NOTE — NURSING TRANSFER
Nursing Transfer Note      5/21/2020     Transfer From: icu to tele 252    Transfer via bed    Transfer with to O2, cardiac monitoring    Transported by icu nurse    Medicines sent: n/a    Chart send with patient: Yes    Notified: patient notified family member.    Patient reassessed at: 5/21/2020 1620    Upon arrival to floor: cardiac monitor applied, patient oriented to room, call bell in reach and bed in lowest position

## 2020-05-21 NOTE — HOSPITAL COURSE
05/21: Seen and examined at bedside. Hospital chart reviewed. No acute interval detrimental events noted. He reports that he  has significantly improved. Off BIPAP. Clinically and hemodynamically stable. Reviewed with cardiology. Patient is ok for transfer to telemetry.  5/21 Improved, no chest pain  5/23 stable , no new pulmonary c/o  5/24 stable improved

## 2020-05-21 NOTE — ASSESSMENT & PLAN NOTE
Cardiology consulted and assisting with management.   Appreciate input.     Due to decompensated heart failure vs. Ischemic heart disease.    Continue to trend cardiac enzymes.    Continue Heparin infusion.       Continue current medical therapy and  risk factor modification           [x]        Weight management is recommended by the AHA for the secondary prevention  [x]        Treatment of High Blood Pressure   (goal of < 140/90 mm Hg // < 130/80 mm Hg in patients with DM or CKD)  []        Beta-blocker therapy.  [x]        Aspirin therapy (81 to 162 mg daily)   [x]        Statin therapy.    [x]        ACE inhibitor therapy.  [x]        Antiplatelet Agent therapy.  [x]        Influenza Vaccination.

## 2020-05-21 NOTE — NURSING
Transfer via WC to room 252 with personal affects. Confirmed heparin drip on handoff. Ptt due at 1900. In stable condition.

## 2020-05-21 NOTE — SUBJECTIVE & OBJECTIVE
Review of Systems   Constitution: Positive for diaphoresis and malaise/fatigue.   HENT: Negative.    Eyes: Negative.    Cardiovascular: Positive for dyspnea on exertion.   Respiratory: Positive for cough and shortness of breath.    Endocrine: Negative.    Hematologic/Lymphatic: Negative.    Skin: Negative.    Musculoskeletal: Negative.    Gastrointestinal: Negative.    Genitourinary: Negative.    Neurological: Negative.    Psychiatric/Behavioral: Negative.    Allergic/Immunologic: Negative.      Objective:     Vital Signs (Most Recent):  Temp: 98.1 °F (36.7 °C) (05/21/20 1100)  Pulse: (!) 115 (05/21/20 1300)  Resp: (!) 23 (05/21/20 1200)  BP: 131/83 (05/21/20 1300)  SpO2: 97 % (05/21/20 1300) Vital Signs (24h Range):  Temp:  [98 °F (36.7 °C)-98.7 °F (37.1 °C)] 98.1 °F (36.7 °C)  Pulse:  [] 115  Resp:  [16-35] 23  SpO2:  [94 %-99 %] 97 %  BP: (116-174)/() 131/83     Weight: 86.1 kg (189 lb 13.1 oz)  Body mass index is 28.03 kg/m².     SpO2: 97 %  O2 Device (Oxygen Therapy): nasal cannula      Intake/Output Summary (Last 24 hours) at 5/21/2020 1403  Last data filed at 5/21/2020 1115  Gross per 24 hour   Intake 757.96 ml   Output 2390 ml   Net -1632.04 ml       Lines/Drains/Airways     Peripheral Intravenous Line                 Peripheral IV - Single Lumen 05/20/20 0810 20 G Left Antecubital 1 day         Peripheral IV - Single Lumen 05/20/20 2240 20 G Left Hand less than 1 day                Physical Exam   Constitutional: He is oriented to person, place, and time. He appears well-developed and well-nourished.   HENT:   Head: Normocephalic.   Neck: Normal range of motion. Neck supple. Normal carotid pulses, no hepatojugular reflux and no JVD present. Carotid bruit is not present. No thyromegaly present.   Cardiovascular: Normal rate, regular rhythm, S1 normal and S2 normal. PMI is not displaced. Exam reveals no S3, no S4, no distant heart sounds, no friction rub, no midsystolic click and no opening  snap.   No murmur heard.  Pulses:       Radial pulses are 2+ on the right side, and 2+ on the left side.   Pulmonary/Chest: Effort normal. He has decreased breath sounds. He has no wheezes. He has no rales.   Abdominal: Soft. Bowel sounds are normal. He exhibits no distension, no abdominal bruit, no ascites and no mass. There is no tenderness.   Musculoskeletal: He exhibits no edema.   Neurological: He is alert and oriented to person, place, and time.   Skin: Skin is warm.   Psychiatric: He has a normal mood and affect. His behavior is normal.   Nursing note and vitals reviewed.      Significant Labs:   BMP:   Recent Labs   Lab 05/20/20  0811 05/20/20 2240 05/21/20  0355   *  --  151*     --  140   K 4.4  --  4.4     --  107   CO2 16*  --  18*   BUN 20  --  31*   CREATININE 2.1*  --  2.0*   CALCIUM 9.0  --  8.8   MG  --  1.9  --    , CMP   Recent Labs   Lab 05/20/20  0811 05/21/20  0355    140   K 4.4 4.4    107   CO2 16* 18*   * 151*   BUN 20 31*   CREATININE 2.1* 2.0*   CALCIUM 9.0 8.8   PROT 7.8  --    ALBUMIN 3.5 3.2*   BILITOT 0.4  --    ALKPHOS 171*  --    AST 14  --    ALT 10  --    ANIONGAP 18* 15   ESTGFRAFRICA 38* 40*   EGFRNONAA 33* 35*   , CBC   Recent Labs   Lab 05/20/20  0811 05/20/20 2240 05/21/20  0355   WBC 13.83* 13.80* 16.00*   HGB 15.5 14.2 14.3   HCT 46.7 40.7 42.2    182 180   , INR   Recent Labs   Lab 05/20/20 2240   INR 0.9    and Troponin   Recent Labs   Lab 05/20/20  1520 05/20/20 2240 05/21/20  0840   TROPONINI 1.180* 2.686* 2.573*       Significant Imaging: Echocardiogram:   Transthoracic echo (TTE) complete (Cupid Only):   Results for orders placed or performed during the hospital encounter of 05/20/20   Echo Color Flow Doppler? Yes   Result Value Ref Range    Ascending aorta 2.81 cm    STJ 3.00 cm    AV mean gradient 2 mmHg    Ao peak neeru 0.90 m/s    Ao VTI 14.90 cm    IVRT 51.38 msec    IVS 1.64 (A) 0.6 - 1.1 cm    LA size 3.46 cm     Left Atrium Major Axis 3.42 cm    Left Atrium Minor Axis 2.79 cm    LVIDD 4.49 3.5 - 6.0 cm    LVIDS 3.85 2.1 - 4.0 cm    LVOT diameter 2.06 cm    LVOT peak VTI 10.76 cm    PW 1.74 (A) 0.6 - 1.1 cm    MV Peak A Papi 0.26 m/s    E wave decelartion time 146.42 msec    MV Peak E Papi 0.87 m/s    RA Major Axis 3.05 cm    Sinus 3.29 cm    TAPSE 1.39 cm    TR Max Papi 3.01 m/s    TDI LATERAL 0.07 m/s    TDI SEPTAL 0.04 m/s    LA WIDTH 3.73 cm    Ao root annulus 3.60 cm    LV Diastolic Volume 92.19 mL    LV Systolic Volume 63.94 mL    LVOT peak papi 0.60 m/s    LV LATERAL E/E' RATIO 12.43 m/s    LV SEPTAL E/E' RATIO 21.75 m/s    FS 14 %    LA volume 33.71 cm3    LV mass 330.84 g    Left Ventricle Relative Wall Thickness 0.78 cm    AV valve area 2.41 cm2    AV Velocity Ratio 0.67     AV index (prosthetic) 0.72     E/A ratio 3.35     Mean e' 0.06 m/s    LVOT area 3.3 cm2    LVOT stroke volume 35.84 cm3    AV peak gradient 3 mmHg    E/E' ratio 15.82 m/s    LV Systolic Volume Index 31.2 mL/m2    LV Diastolic Volume Index 44.92 mL/m2    LA Volume Index 16.4 mL/m2    LV Mass Index 161 g/m2    Triscuspid Valve Regurgitation Peak Gradient 36 mmHg    BSA 2.09 m2    Narrative    · Severely decreased left ventricular systolic function. The estimated   ejection fraction is 20%.  · Grade III (severe) left ventricular diastolic dysfunction consistent   with restrictive physiology.  · Normal right ventricular systolic function.  · Concentric left ventricular hypertrophy.

## 2020-05-21 NOTE — ASSESSMENT & PLAN NOTE
Acute coronary syndrome vs decompensated heart failure.  Cardiology assisting with management.  Continue to monitor.

## 2020-05-21 NOTE — SUBJECTIVE & OBJECTIVE
Past Medical History:   Diagnosis Date    COPD (chronic obstructive pulmonary disease)     Gout     Hypertension        Past Surgical History:   Procedure Laterality Date    FRACTURE SURGERY         Review of patient's allergies indicates:  No Known Allergies    No current facility-administered medications on file prior to encounter.      No current outpatient medications on file prior to encounter.     Family History     Reviewed and not pertinent        Tobacco Use    Smoking status: Former Smoker   Substance and Sexual Activity    Alcohol use: Not Currently    Drug use: Never    Sexual activity: Not on file     Review of Systems   Constitutional: Positive for activity change. Negative for appetite change, chills, diaphoresis, fatigue and fever.   HENT: Negative.    Respiratory: Positive for cough and shortness of breath. Negative for wheezing.    Cardiovascular: Negative for chest pain, palpitations and leg swelling.   Gastrointestinal: Negative for abdominal pain, constipation, diarrhea and vomiting.   Genitourinary: Negative for difficulty urinating, flank pain and frequency.   Musculoskeletal: Negative for arthralgias, back pain and myalgias.   Skin: Negative for pallor, rash and wound.   Neurological: Positive for light-headedness.   Psychiatric/Behavioral: Negative.      Objective:     Vital Signs (Most Recent):  Temp: 98.1 °F (36.7 °C) (05/21/20 1100)  Pulse: (!) 115 (05/21/20 1300)  Resp: (!) 23 (05/21/20 1200)  BP: 131/83 (05/21/20 1300)  SpO2: 97 % (05/21/20 1300) Vital Signs (24h Range):  Temp:  [98 °F (36.7 °C)-98.5 °F (36.9 °C)] 98.1 °F (36.7 °C)  Pulse:  [] 115  Resp:  [16-35] 23  SpO2:  [94 %-99 %] 97 %  BP: (116-174)/() 131/83     Weight: 86.1 kg (189 lb 13.1 oz)  Body mass index is 28.03 kg/m².    Physical Exam   Constitutional: He is oriented to person, place, and time. He appears well-developed and well-nourished.   HENT:   Head: Normocephalic and atraumatic.   Nose: Nose  normal.   Mouth/Throat: Oropharynx is clear and moist.   Eyes: Pupils are equal, round, and reactive to light. Conjunctivae are normal. No scleral icterus.   Neck: Normal range of motion. Neck supple.   Cardiovascular: Regular rhythm and normal heart sounds. Tachycardia present. Exam reveals no gallop and no friction rub.   No murmur heard.  Pulmonary/Chest: Effort normal. He has rales in the left lower field.   Abdominal: Soft. Bowel sounds are normal.   Musculoskeletal: Normal range of motion. He exhibits no edema or tenderness.   Neurological: He is alert and oriented to person, place, and time.   Skin: Skin is warm and dry.   Psychiatric: He has a normal mood and affect. His behavior is normal.   Vitals reviewed.      Significant Labs:   CBC:   Recent Labs   Lab 05/20/20  0811 05/20/20  2240 05/21/20  0355   WBC 13.83* 13.80* 16.00*   HGB 15.5 14.2 14.3   HCT 46.7 40.7 42.2    182 180     CMP:   Recent Labs   Lab 05/20/20  0811 05/21/20  0355    140   K 4.4 4.4    107   CO2 16* 18*   * 151*   BUN 20 31*   CREATININE 2.1* 2.0*   CALCIUM 9.0 8.8   PROT 7.8  --    ALBUMIN 3.5 3.2*   BILITOT 0.4  --    ALKPHOS 171*  --    AST 14  --    ALT 10  --    ANIONGAP 18* 15   EGFRNONAA 33* 35*     All pertinent labs within the past 24 hours have been reviewed.    Significant Imaging: I have reviewed all pertinent imaging results/findings within the past 24 hours.

## 2020-05-21 NOTE — ASSESSMENT & PLAN NOTE
Due to decompensated heart failure vs. Ischemic heart disease.  Continue to trend cardiac enzymes.  Cardiology evaluated and assisting with management.  Starting Heparin infusion.

## 2020-05-21 NOTE — HOSPITAL COURSE
"5/21/20:  PT SEEN AND EXAMINED IN ICU.  HE FELT REMARKABLY IMPROVED, ON NASAL CANNULA.  DENIES CHEST PAIN SXS.  DYSPNEA MUCH IMPROVED.  TROPONIN PEAK NEAR 2.6  BP HAS BEEN VERY HIGH. LACTIC ACID NORMAL NOW.  CREATININE STABLE.    5/22/2020-Patient seen and examined today, resting in bed. Feeling much better, SOB greatly improved. Some mild chest pressure, overnight, has since resolved. Labs reviewed. Troponin> 6 this AM, will repeat later today. Creatinine 1.9. BNP improved but still elevated.     5/23/20:  PT SEEN AND EXAMINED THIS AM.  NO CP SXS.  STILL WITH SOME DYSPNEA BUT MUCH IMPROVED.  LABS STABLE OVERALL. CREATININE BUMPED UP SLIGHTLY.  TROPONIN TRENDING DOWN.  BP MUCH IMPROVED.  ALSO DRINKS 1 PINT ETOH EVERY FEW DAYS.     5/24/20:  PT SEEN AND EXAMINED THIS AM. FELT BETTER.  NO RECURRENT CP.  DYSPNEA IMPROVED.  CREATININE STABLE, 1.9.  BNP MUCH IMPROVED.  BP REMAINS WELL CONTROLLED NOW.  + ABNL VASC STUDIES INFRAPOPLITEAL DISEASE.    5/25/2020-Patient seen and examined, lying in bed. Feels ok. Complains of having the "shakes". No chest pain or SOB. Labs reviewed. Creatinine stable at 2.0. L/RHC postponed due to possible DT's, discussed with hospital medicine.    5/26/2020-Patient seen and examined today, lying comfortably in bed. Feeling better. Shakes have resolved. No chest pain/SOB. Labs reviewed. Creatinine improved to 1.6. L/RHC today.    5/27/2020-Patient seen and examined today, s/p LHC yesterday that showed multivessel CAD. Feels ok. No chest pain/SOB. CVT evaluated patient, not candidate for CABG given poor targets. Labs reviewed and are stable. Meds further optimized.     5/28/2020-Patient seen and examined today. Feels ok. Less joint pain. No chest pain/SOB. Labs reviewed. BMP stable. Needs LifeVest for SCD prevention.  "

## 2020-05-22 PROBLEM — I21.4 NSTEMI (NON-ST ELEVATED MYOCARDIAL INFARCTION): Status: ACTIVE | Noted: 2020-05-20

## 2020-05-22 PROBLEM — F41.9 ANXIETY: Status: ACTIVE | Noted: 2020-05-22

## 2020-05-22 PROBLEM — J43.1 PANLOBULAR EMPHYSEMA: Status: ACTIVE | Noted: 2020-05-22

## 2020-05-22 LAB
ALBUMIN SERPL BCP-MCNC: 3.5 G/DL (ref 3.5–5.2)
ALBUMIN SERPL BCP-MCNC: 3.5 G/DL (ref 3.5–5.2)
ALP SERPL-CCNC: 135 U/L (ref 55–135)
ALT SERPL W/O P-5'-P-CCNC: 13 U/L (ref 10–44)
ANION GAP SERPL CALC-SCNC: 12 MMOL/L (ref 8–16)
ANION GAP SERPL CALC-SCNC: 14 MMOL/L (ref 8–16)
APTT BLDCRRT: 39.9 SEC (ref 21–32)
APTT BLDCRRT: 48.4 SEC (ref 21–32)
AST SERPL-CCNC: 29 U/L (ref 10–40)
BASOPHILS # BLD AUTO: 0.07 K/UL (ref 0–0.2)
BASOPHILS NFR BLD: 0.5 % (ref 0–1.9)
BILIRUB SERPL-MCNC: 0.5 MG/DL (ref 0.1–1)
BNP SERPL-MCNC: 1127 PG/ML (ref 0–99)
BUN SERPL-MCNC: 36 MG/DL (ref 8–23)
BUN SERPL-MCNC: 40 MG/DL (ref 8–23)
CALCIUM SERPL-MCNC: 9.5 MG/DL (ref 8.7–10.5)
CALCIUM SERPL-MCNC: 9.7 MG/DL (ref 8.7–10.5)
CHLORIDE SERPL-SCNC: 102 MMOL/L (ref 95–110)
CHLORIDE SERPL-SCNC: 102 MMOL/L (ref 95–110)
CO2 SERPL-SCNC: 26 MMOL/L (ref 23–29)
CO2 SERPL-SCNC: 27 MMOL/L (ref 23–29)
CREAT SERPL-MCNC: 1.8 MG/DL (ref 0.5–1.4)
CREAT SERPL-MCNC: 1.9 MG/DL (ref 0.5–1.4)
DIFFERENTIAL METHOD: ABNORMAL
EOSINOPHIL # BLD AUTO: 0 K/UL (ref 0–0.5)
EOSINOPHIL NFR BLD: 0.3 % (ref 0–8)
ERYTHROCYTE [DISTWIDTH] IN BLOOD BY AUTOMATED COUNT: 17.8 % (ref 11.5–14.5)
EST. GFR  (AFRICAN AMERICAN): 43 ML/MIN/1.73 M^2
EST. GFR  (AFRICAN AMERICAN): 46 ML/MIN/1.73 M^2
EST. GFR  (NON AFRICAN AMERICAN): 37 ML/MIN/1.73 M^2
EST. GFR  (NON AFRICAN AMERICAN): 40 ML/MIN/1.73 M^2
GLUCOSE SERPL-MCNC: 105 MG/DL (ref 70–110)
GLUCOSE SERPL-MCNC: 106 MG/DL (ref 70–110)
HCT VFR BLD AUTO: 45.2 % (ref 40–54)
HGB BLD-MCNC: 15.1 G/DL (ref 14–18)
IMM GRANULOCYTES # BLD AUTO: 0.1 K/UL (ref 0–0.04)
IMM GRANULOCYTES NFR BLD AUTO: 0.7 % (ref 0–0.5)
LYMPHOCYTES # BLD AUTO: 2.1 K/UL (ref 1–4.8)
LYMPHOCYTES NFR BLD: 15.6 % (ref 18–48)
MCH RBC QN AUTO: 31.6 PG (ref 27–31)
MCHC RBC AUTO-ENTMCNC: 33.4 G/DL (ref 32–36)
MCV RBC AUTO: 95 FL (ref 82–98)
MONOCYTES # BLD AUTO: 1 K/UL (ref 0.3–1)
MONOCYTES NFR BLD: 7.5 % (ref 4–15)
NEUTROPHILS # BLD AUTO: 10.3 K/UL (ref 1.8–7.7)
NEUTROPHILS NFR BLD: 75.4 % (ref 38–73)
NRBC BLD-RTO: 0 /100 WBC
PHOSPHATE SERPL-MCNC: 3.1 MG/DL (ref 2.7–4.5)
PLATELET # BLD AUTO: 198 K/UL (ref 150–350)
PMV BLD AUTO: 12.7 FL (ref 9.2–12.9)
POTASSIUM SERPL-SCNC: 3.7 MMOL/L (ref 3.5–5.1)
POTASSIUM SERPL-SCNC: 3.7 MMOL/L (ref 3.5–5.1)
PROT SERPL-MCNC: 7.6 G/DL (ref 6–8.4)
RBC # BLD AUTO: 4.78 M/UL (ref 4.6–6.2)
SODIUM SERPL-SCNC: 141 MMOL/L (ref 136–145)
SODIUM SERPL-SCNC: 142 MMOL/L (ref 136–145)
TROPONIN I SERPL DL<=0.01 NG/ML-MCNC: 6.89 NG/ML (ref 0–0.03)
WBC # BLD AUTO: 13.65 K/UL (ref 3.9–12.7)

## 2020-05-22 PROCEDURE — 99233 SBSQ HOSP IP/OBS HIGH 50: CPT | Mod: ,,, | Performed by: INTERNAL MEDICINE

## 2020-05-22 PROCEDURE — 94760 N-INVAS EAR/PLS OXIMETRY 1: CPT

## 2020-05-22 PROCEDURE — 25000242 PHARM REV CODE 250 ALT 637 W/ HCPCS: Performed by: INTERNAL MEDICINE

## 2020-05-22 PROCEDURE — 94640 AIRWAY INHALATION TREATMENT: CPT

## 2020-05-22 PROCEDURE — 25000003 PHARM REV CODE 250: Performed by: FAMILY MEDICINE

## 2020-05-22 PROCEDURE — 99233 PR SUBSEQUENT HOSPITAL CARE,LEVL III: ICD-10-PCS | Mod: ,,, | Performed by: INTERNAL MEDICINE

## 2020-05-22 PROCEDURE — 63600175 PHARM REV CODE 636 W HCPCS: Performed by: INTERNAL MEDICINE

## 2020-05-22 PROCEDURE — 80069 RENAL FUNCTION PANEL: CPT

## 2020-05-22 PROCEDURE — 99900035 HC TECH TIME PER 15 MIN (STAT)

## 2020-05-22 PROCEDURE — 21400001 HC TELEMETRY ROOM

## 2020-05-22 PROCEDURE — 25000003 PHARM REV CODE 250: Performed by: INTERNAL MEDICINE

## 2020-05-22 PROCEDURE — 84484 ASSAY OF TROPONIN QUANT: CPT

## 2020-05-22 PROCEDURE — 83880 ASSAY OF NATRIURETIC PEPTIDE: CPT

## 2020-05-22 PROCEDURE — 36415 COLL VENOUS BLD VENIPUNCTURE: CPT

## 2020-05-22 PROCEDURE — 25000003 PHARM REV CODE 250: Performed by: PHYSICIAN ASSISTANT

## 2020-05-22 PROCEDURE — 85025 COMPLETE CBC W/AUTO DIFF WBC: CPT

## 2020-05-22 PROCEDURE — 94761 N-INVAS EAR/PLS OXIMETRY MLT: CPT

## 2020-05-22 PROCEDURE — 27000221 HC OXYGEN, UP TO 24 HOURS

## 2020-05-22 PROCEDURE — S4991 NICOTINE PATCH NONLEGEND: HCPCS | Performed by: INTERNAL MEDICINE

## 2020-05-22 PROCEDURE — 85730 THROMBOPLASTIN TIME PARTIAL: CPT | Mod: 91

## 2020-05-22 PROCEDURE — 80053 COMPREHEN METABOLIC PANEL: CPT

## 2020-05-22 RX ORDER — ALPRAZOLAM 0.5 MG/1
0.5 TABLET ORAL ONCE
Status: COMPLETED | OUTPATIENT
Start: 2020-05-22 | End: 2020-05-22

## 2020-05-22 RX ORDER — ALBUTEROL SULFATE 0.83 MG/ML
2.5 SOLUTION RESPIRATORY (INHALATION) EVERY 6 HOURS
Status: DISCONTINUED | OUTPATIENT
Start: 2020-05-22 | End: 2020-05-23

## 2020-05-22 RX ORDER — IPRATROPIUM BROMIDE 0.5 MG/2.5ML
0.5 SOLUTION RESPIRATORY (INHALATION) EVERY 6 HOURS
Status: DISCONTINUED | OUTPATIENT
Start: 2020-05-22 | End: 2020-05-23

## 2020-05-22 RX ORDER — ISOSORBIDE MONONITRATE 30 MG/1
30 TABLET, EXTENDED RELEASE ORAL DAILY
Status: DISCONTINUED | OUTPATIENT
Start: 2020-05-22 | End: 2020-05-29 | Stop reason: HOSPADM

## 2020-05-22 RX ORDER — ALPRAZOLAM 0.5 MG/1
0.5 TABLET ORAL 3 TIMES DAILY PRN
Status: DISCONTINUED | OUTPATIENT
Start: 2020-05-22 | End: 2020-05-29 | Stop reason: HOSPADM

## 2020-05-22 RX ADMIN — CLOPIDOGREL BISULFATE 75 MG: 75 TABLET ORAL at 09:05

## 2020-05-22 RX ADMIN — HEPARIN SODIUM 19 UNITS/KG/HR: 10000 INJECTION, SOLUTION INTRAVENOUS at 09:05

## 2020-05-22 RX ADMIN — ALPRAZOLAM 0.5 MG: 0.5 TABLET ORAL at 06:05

## 2020-05-22 RX ADMIN — ALBUTEROL SULFATE 2.5 MG: 2.5 SOLUTION RESPIRATORY (INHALATION) at 07:05

## 2020-05-22 RX ADMIN — HEPARIN SODIUM 19 UNITS/KG/HR: 10000 INJECTION, SOLUTION INTRAVENOUS at 02:05

## 2020-05-22 RX ADMIN — ALBUTEROL SULFATE 2.5 MG: 2.5 SOLUTION RESPIRATORY (INHALATION) at 11:05

## 2020-05-22 RX ADMIN — ASPIRIN 81 MG: 81 TABLET, COATED ORAL at 09:05

## 2020-05-22 RX ADMIN — ATORVASTATIN CALCIUM 40 MG: 40 TABLET, FILM COATED ORAL at 09:05

## 2020-05-22 RX ADMIN — LISINOPRIL 5 MG: 5 TABLET ORAL at 09:05

## 2020-05-22 RX ADMIN — IPRATROPIUM BROMIDE 0.5 MG: 0.5 SOLUTION RESPIRATORY (INHALATION) at 11:05

## 2020-05-22 RX ADMIN — Medication 1 PATCH: at 09:05

## 2020-05-22 RX ADMIN — FUROSEMIDE 40 MG: 10 INJECTION, SOLUTION INTRAMUSCULAR; INTRAVENOUS at 09:05

## 2020-05-22 RX ADMIN — ISOSORBIDE MONONITRATE 30 MG: 30 TABLET, EXTENDED RELEASE ORAL at 01:05

## 2020-05-22 RX ADMIN — IPRATROPIUM BROMIDE 0.5 MG: 0.5 SOLUTION RESPIRATORY (INHALATION) at 07:05

## 2020-05-22 RX ADMIN — CARVEDILOL 3.12 MG: 3.12 TABLET, FILM COATED ORAL at 09:05

## 2020-05-22 RX ADMIN — HEPARIN SODIUM 19 UNITS/KG/HR: 10000 INJECTION, SOLUTION INTRAVENOUS at 12:05

## 2020-05-22 NOTE — ASSESSMENT & PLAN NOTE
05/22/2020  ASSESSMENT: No infectious source. Could be reaction to corticosteroids or stress.   PLAN:  Monitor.    Recent Labs   Lab 05/20/20  0811 05/20/20  2240 05/21/20  0355 05/22/20  0707   WBC 13.83* 13.80* 16.00* 13.65*     Microbiology Results (last 7 days)     Procedure Component Value Units Date/Time    Blood culture x two cultures. Draw prior to antibiotics. [[id number]] Collected:  05/20/20 0814    Order Status:  Completed Specimen:  Blood from Peripheral, Antecubital, Right Updated:  05/22/20 1612     Blood Culture, Routine No Growth to date      No Growth to date      No Growth to date    Narrative:       Aerobic and anaerobic    Blood culture x two cultures. Draw prior to antibiotics. [[id number]] Collected:  05/20/20 0811    Order Status:  Completed Specimen:  Blood from Peripheral, Antecubital, Left Updated:  05/22/20 1612     Blood Culture, Routine No Growth to date      No Growth to date      No Growth to date    Narrative:       Aerobic and anaerobic         Antibiotics (From admission, onward)    None

## 2020-05-22 NOTE — PLAN OF CARE
POC reviewed with pt. Pt verbalizes understanding of POC. No questions at this time.  AAOx3. NADN.  NSR on cardiac monitor.  Heparin infusing at 19 units/kg/hr.  Pt remains free of falls.  No complaints at this time.  Safety measures in place. Will continue to monitor.  Informed pt to call for assistance before getting up. Pt verbalizes understanding.

## 2020-05-22 NOTE — ASSESSMENT & PLAN NOTE
05/22/2020  ASSESSMENT: He is currently at the action (quitting) stage of smoking cessation. Smoking cessation encouraged.    PLAN:  Continue nicotine patch. Referral to tobacco cessation program after discharge.

## 2020-05-22 NOTE — ASSESSMENT & PLAN NOTE
05/22/2020  ASSESSMENT: No chest pain at present. Followed by cardiology. CV Meds include heparin, carvedilol, lisinopril, furosemide, isosorbide mononitrate, atorvastatin, aspirin. PLAN:  Continue management per cardiology.   Recent Labs   Lab 05/20/20  2240 05/21/20  0840 05/22/20  0707   TROPONINI 2.686* 2.573* 6.891*     Results for orders placed or performed during the hospital encounter of 05/20/20   EKG 12-lead    Collection Time: 05/21/20  5:55 AM    Narrative    Test Reason :     Vent. Rate : 110 BPM     Atrial Rate : 110 BPM     P-R Int : 176 ms          QRS Dur : 116 ms      QT Int : 372 ms       P-R-T Axes : 060 057 129 degrees     QTc Int : 503 ms    Program found technically poor ECG  Sinus tachycardia  Possible Left atrial enlargement  ST and T wave abnormality, consider lateral ischemia  Abnormal ECG  When compared with ECG of 20-MAY-2020 09:00,  Nonspecific T wave abnormality has replaced inverted T waves in Inferior  leads  Confirmed by BOWEN ASIF MD ([id number]) on 5/21/2020 8:51:28 PM    Referred By: [email or web address]   SELF           Confirmed By:BOWEN ASIF MD

## 2020-05-22 NOTE — PROGRESS NOTES
[facility] -   Cardiology  Progress Note    Patient Name: Ceasar Hernandez  MRN: [id number]  Admission Date: 5/20/2020  Hospital Length of Stay: 2 days  Code Status: Full Code   Attending Physician: REGIS Delgado MD   Primary Care Physician: Provider Not[facility]  Expected Discharge Date:   Principal Problem:Acute combined systolic and diastolic CHF, NYHA class 4    Subjective:   HPI:  Pt presents with acute respiratory failure.  Per ER chart, suspicious for Covid 19.  Cardiology consult performed based on communication from ER physician and chart review.  He has h/o COPD.  Pt presented to ER with c/o dyspnea since yesterday with associated wheezing.  Per chart no cp sxs.  ECG in ER showed possible SVT.  Adenosine administered and f/u ecgs looked more like sinus tachycardia.  BP very high on arrival 217/109.  Given Labetalol in ER.    BNP 1194  Troponin 0.09  Lactic acid +  ecg with inferolateral st-t abnl.  No old records to compare/review.    Hospital Course:   5/21/20:  PT SEEN AND EXAMINED IN ICU.  HE FELT REMARKABLY IMPROVED, ON NASAL CANNULA.  DENIES CHEST PAIN SXS.  DYSPNEA MUCH IMPROVED.  TROPONIN PEAK NEAR 2.6  BP HAS BEEN VERY HIGH. LACTIC ACID NORMAL NOW.  CREATININE STABLE.    5/22/2020-Patient seen and examined today, resting in bed. Feeling much better, SOB greatly improved. Some mild chest pressure, overnight, has since resolved. Labs reviewed. Troponin> 6 this AM, will repeat later today. Creatinine 1.9. BNP improved but still elevated.         Review of Systems   Constitution: Positive for malaise/fatigue.   HENT: Negative.    Eyes: Negative.    Cardiovascular: Positive for chest pain (chest pressure (now resolved)) and dyspnea on exertion.   Respiratory: Positive for shortness of breath.    Endocrine: Negative.    Skin: Negative.    Musculoskeletal: Negative.      Objective:     Vital Signs (Most Recent):  Temp: 98.3 °F (36.8 °C) (05/22/20 1140)  Pulse: 96 (05/22/20 1300)  Resp: 20  (05/22/20 1140)  BP: 122/88 (05/22/20 1140)  SpO2: (!) 93 % (05/22/20 1140) Vital Signs (24h Range):  Temp:  [97.5 °F (36.4 °C)-99.2 °F (37.3 °C)] 98.3 °F (36.8 °C)  Pulse:  [] 96  Resp:  [18-24] 20  SpO2:  [93 %-98 %] 93 %  BP: (117-145)/(79-92) 122/88     Weight: 86.9 kg (191 lb 9.3 oz)  Body mass index is 28.29 kg/m².     SpO2: (!) 93 %  O2 Device (Oxygen Therapy): nasal cannula      Intake/Output Summary (Last 24 hours) at 5/22/2020 1337  Last data filed at 5/22/2020 0600  Gross per 24 hour   Intake 255.53 ml   Output 2550 ml   Net -2294.47 ml       Lines/Drains/Airways     Peripheral Intravenous Line                 Peripheral IV - Single Lumen 05/20/20 0810 20 G Left Antecubital 2 days         Peripheral IV - Single Lumen 05/20/20 2240 20 G Left Hand 1 day                Physical Exam   Constitutional: He is oriented to person, place, and time. He appears well-developed and well-nourished. No distress.   HENT:   Head: Normocephalic and atraumatic.   Eyes: Pupils are equal, round, and reactive to light. Right eye exhibits no discharge. Left eye exhibits no discharge.   Neck: Neck supple. No JVD present. No tracheal deviation present.   Cardiovascular: Normal rate, regular rhythm, S1 normal, S2 normal and normal heart sounds.   No murmur heard.  Pulmonary/Chest: Effort normal. No respiratory distress. He has no wheezes.   Diminished BS at bases     Abdominal: Soft. He exhibits no distension.   Musculoskeletal: He exhibits no edema.   Neurological: He is alert and oriented to person, place, and time.   Skin: Skin is warm and dry. He is not diaphoretic. No erythema.   Psychiatric: He has a normal mood and affect. His behavior is normal. Thought content normal.   Nursing note and vitals reviewed.      Significant Labs:   CMP   Recent Labs   Lab 05/21/20  0355 05/22/20  0707    142  141   K 4.4 3.7  3.7    102  102   CO2 18* 26  27   * 106  105   BUN 31* 40*  36*   CREATININE 2.0* 1.9*   1.8*   CALCIUM 8.8 9.5  9.7   PROT  --  7.6   ALBUMIN 3.2* 3.5  3.5   BILITOT  --  0.5   ALKPHOS  --  135   AST  --  29   ALT  --  13   ANIONGAP 15 14  12   ESTGFRAFRICA 40* 43*  46*   EGFRNONAA 35* 37*  40*   , CBC   Recent Labs   Lab 05/20/20  2240 05/21/20  0355 05/22/20  0707   WBC 13.80* 16.00* 13.65*   HGB 14.2 14.3 15.1   HCT 40.7 42.2 45.2    180 198   , Troponin   Recent Labs   Lab 05/20/20  2240 05/21/20  0840 05/22/20  0707   TROPONINI 2.686* 2.573* 6.891*    and All pertinent lab results from the last 24 hours have been reviewed.    Significant Imaging: Echocardiogram: 2D echo with color flow doppler: No results found for this or any previous visit., EKG: Reviewed and X-Ray: CXR: X-Ray Chest 1 View (CXR): No results found for this visit on 05/20/20. and X-Ray Chest PA and Lateral (CXR): No results found for this visit on 05/20/20.    Assessment and Plan:   Patient who presents with combined CHF/COPD/elevated troponin. Improving. Troponin bumped to 6, repeat level today. Continue heparin gtt for additional day. Add Imdur 30 mg daily. Will need ischemic workup prior to d/c.     * Acute combined systolic and diastolic CHF, NYHA class 4  -Presents with acute decompensated combined CHF  -EF 20%  -Continue IV diuresis for additional day  -Continue BB, ACEi  -Add Imdur  -Strict I's/O's  -Ischemic evaluation prior to d/c    Smoking history  -Smoking cessation      Lactic acidosis  -Normalized since admission    COPD (chronic obstructive pulmonary disease)  -Per hospital medicine, critical care mgt.    Essential hypertension  -BP improved  -Continue Coreg, ACEi  -Imdur 30 mg daily added  -Monitor    Elevated troponin  -Troponin > 6 this AM, repeat this afternoon  -Continue ASA, Plavix, BB, statin, ACEi  -Add Imdur 30 mg daily  -Continue heparin gtt for additional day  -EF 20%  -Will need ischemic evaluation with probable LHC pending clinical course    Abnormal ECG  -Ischemic evaluation with LHC vs  MPI stress test pending clinical course        VTE Risk Mitigation (From admission, onward)         Ordered     heparin 25,000 units in dextrose 5% 250 mL (100 units/mL) infusion LOW INTENSITY nomogram - [facility]  Continuous     Question:  Heparin Infusion Adjustment (DO NOT MODIFY ANSWER)  Answer:  \\[email or web address]\epic\Images\Pharmacy\HeparinInfusions\heparin LOW INTENSITY nomogram for [facility] [id number].pdf    05/20/20 2103     heparin 25,000 units in dextrose 5% (100 units/ml) IV bolus from bag - ADDITIONAL PRN BOLUS - 60 units/kg (max bolus 4000 units)  As needed (PRN)     Question:  Heparin Infusion Adjustment (DO NOT MODIFY ANSWER)  Answer:  \\[email or web address]\epic\Images\Pharmacy\HeparinInfusions\heparin LOW INTENSITY nomogram for [facility] [id number].pdf    05/20/20 2103     heparin 25,000 units in dextrose 5% (100 units/ml) IV bolus from bag - ADDITIONAL PRN BOLUS - 30 units/kg (max bolus 4000 units)  As needed (PRN)     Question:  Heparin Infusion Adjustment (DO NOT MODIFY ANSWER)  Answer:  \\[email or web address]\epic\Images\Pharmacy\HeparinInfusions\heparin LOW INTENSITY nomogram for [facility] [id number].pdf    05/20/20 2103     IP VTE HIGH RISK PATIENT  Once      05/20/20 2058     Place sequential compression device  Until discontinued      05/20/20 2058                Renata Chowdhury PA-C  Cardiology  [facility]

## 2020-05-22 NOTE — SUBJECTIVE & OBJECTIVE
Past Medical History:   Diagnosis Date    COPD (chronic obstructive pulmonary disease)     Gout     Hypertension     NSTEMI (non-ST elevated myocardial infarction) 5/20/2020       Past Surgical History:   Procedure Laterality Date    FRACTURE SURGERY         Review of patient's allergies indicates:  No Known Allergies    Family History     None        Tobacco Use    Smoking status: Current Every Day Smoker     Packs/day: 1.00     Years: 40.00     Pack years: 40.00     Types: Cigarettes     Start date: 1/1/1979    Smokeless tobacco: Never Used   Substance and Sexual Activity    Alcohol use: Yes     Comment:  PINT/DAILY    Drug use: Not Currently    Sexual activity: Not Currently     Partners: Female         Review of Systems   Constitutional: Positive for activity change and diaphoresis.   HENT: Positive for postnasal drip and rhinorrhea.    Respiratory: Positive for cough, shortness of breath and wheezing.    Cardiovascular: Positive for chest pain, palpitations and leg swelling.        Paroxysmal nocturnal dyspnea     Gastrointestinal: Negative.    Endocrine: Negative.    Genitourinary: Negative.    Musculoskeletal: Positive for back pain.   Skin: Negative.    Allergic/Immunologic: Negative.    Neurological: Positive for weakness.   Hematological: Negative.      Objective:     Vital Signs (Most Recent):  Temp: 98.3 °F (36.8 °C) (05/22/20 1530)  Pulse: 96 (05/22/20 1530)  Resp: 20 (05/22/20 1530)  BP: 119/74 (05/22/20 1530)  SpO2: (!) 94 % (05/22/20 1530) Vital Signs (24h Range):  Temp:  [97.5 °F (36.4 °C)-98.3 °F (36.8 °C)] 98.3 °F (36.8 °C)  Pulse:  [] 96  Resp:  [18-20] 20  SpO2:  [93 %-98 %] 94 %  BP: (117-134)/(74-88) 119/74     Weight: 86.9 kg (191 lb 9.3 oz)  Body mass index is 28.29 kg/m².      Intake/Output Summary (Last 24 hours) at 5/22/2020 1722  Last data filed at 5/22/2020 1600  Gross per 24 hour   Intake 723.07 ml   Output 2575 ml   Net -1851.93 ml       Physical Exam   Constitutional:  He is oriented to person, place, and time. He appears well-developed and well-nourished.   HENT:   Head: Normocephalic and atraumatic.   Eyes: Pupils are equal, round, and reactive to light. Conjunctivae are normal.   Neck: Neck supple. JVD present. No tracheal deviation present. No thyromegaly present.   Cardiovascular: Normal rate. A regularly irregular rhythm present. PMI is displaced.   Murmur heard.   Systolic murmur is present with a grade of 2/6.  Pulmonary/Chest: Effort normal. He has decreased breath sounds. He has wheezes. He has rales in the right lower field and the left lower field.   Abdominal: Soft.   Musculoskeletal: Normal range of motion. He exhibits edema.   Lymphadenopathy:     He has no cervical adenopathy.   Neurological: He is alert and oriented to person, place, and time.   Skin: Skin is warm and dry.   Nursing note and vitals reviewed.      Vents:  Oxygen Concentration (%): 3 (05/21/20 0828)    Lines/Drains/Airways     Peripheral Intravenous Line                 Peripheral IV - Single Lumen 05/20/20 0810 20 G Left Antecubital 2 days         Peripheral IV - Single Lumen 05/20/20 2240 20 G Left Hand 1 day                Significant Labs:    CBC/Anemia Profile:  Recent Labs   Lab 05/20/20 2240 05/21/20  0355 05/22/20  0707   WBC 13.80* 16.00* 13.65*   HGB 14.2 14.3 15.1   HCT 40.7 42.2 45.2    180 198   MCV 93 94 95   RDW 17.5* 17.6* 17.8*        Chemistries:  Recent Labs   Lab 05/20/20 2240 05/21/20  0355 05/22/20  0707   NA  --  140 142  141   K  --  4.4 3.7  3.7   CL  --  107 102  102   CO2  --  18* 26  27   BUN  --  31* 40*  36*   CREATININE  --  2.0* 1.9*  1.8*   CALCIUM  --  8.8 9.5  9.7   ALBUMIN  --  3.2* 3.5  3.5   PROT  --   --  7.6   BILITOT  --   --  0.5   ALKPHOS  --   --  135   ALT  --   --  13   AST  --   --  29   MG 1.9  --   --    PHOS 4.0 4.0 3.1       BMP:   Recent Labs   Lab 05/20/20  2240  05/22/20  0707   GLU  --    < > 106  105   NA  --    < > 142  141    K  --    < > 3.7  3.7   CL  --    < > 102  102   CO2  --    < > 26  27   BUN  --    < > 40*  36*   CREATININE  --    < > 1.9*  1.8*   CALCIUM  --    < > 9.5  9.7   MG 1.9  --   --     < > = values in this interval not displayed.     CMP:   Recent Labs   Lab 05/21/20  0355 05/22/20  0707    142  141   K 4.4 3.7  3.7    102  102   CO2 18* 26  27   * 106  105   BUN 31* 40*  36*   CREATININE 2.0* 1.9*  1.8*   CALCIUM 8.8 9.5  9.7   PROT  --  7.6   ALBUMIN 3.2* 3.5  3.5   BILITOT  --  0.5   ALKPHOS  --  135   AST  --  29   ALT  --  13   ANIONGAP 15 14  12   EGFRNONAA 35* 37*  40*     All pertinent labs within the past 24 hours have been reviewed.    Significant Imaging:   I have reviewed all pertinent imaging results/findings within the past 24 hours.  I have reviewed and interpreted all pertinent imaging results/findings within the past 24 hours.     X-Ray Chest AP Portable  Narrative: EXAMINATION:  XR CHEST AP PORTABLE    CLINICAL HISTORY:  follow up CHF; worsening leukocytosis;.    TECHNIQUE:  Single frontal portable view of the chest was performed.    COMPARISON:  05/20/2020    FINDINGS:  Support devices: None    Compared to 05/20/2020, unchanged appearance of the chest.  Right apical emphysematous bleb.    Unchanged moderate cardiomegaly.  No pleural effusion or pneumothorax.    Distal right clavicular reconstruction plate.  Impression: Unchanged appearance of the chest compared to 05/20/2020.    Electronically signed by: Jose De Paz  Date:    05/21/2020  Time:    13:01

## 2020-05-22 NOTE — ASSESSMENT & PLAN NOTE
05/22/2020  ASSESSMENT: Anxiety about health, exacerbated by smoking cessation and insomnia. [facility] Controlled Prescription Drug Monitoring database was queried and showed no activity to suggest abuse, diversion, or other inappropriate use of prescription medications.  PLAN:  Alprazolam PRN.

## 2020-05-22 NOTE — ASSESSMENT & PLAN NOTE
05/22/2020  ASSESSMENT: Chronic condition. Maintaining SpO2 on room air.   PLAN:  Continue present treatment plan.

## 2020-05-22 NOTE — ASSESSMENT & PLAN NOTE
05/22/2020  ASSESSMENT: Controlled.   PLAN:  Continue present treatment plan.  Temp:  [97.5 °F (36.4 °C)-99.2 °F (37.3 °C)] 98.3 °F (36.8 °C)  Pulse:  [] 96  Resp:  [18-20] 20  SpO2:  [93 %-98 %] 94 %  BP: (117-145)/(74-92) 119/74

## 2020-05-22 NOTE — SUBJECTIVE & OBJECTIVE
Review of Systems   Constitution: Positive for malaise/fatigue.   HENT: Negative.    Eyes: Negative.    Cardiovascular: Positive for chest pain (chest pressure (now resolved)) and dyspnea on exertion.   Respiratory: Positive for shortness of breath.    Endocrine: Negative.    Skin: Negative.    Musculoskeletal: Negative.      Objective:     Vital Signs (Most Recent):  Temp: 98.3 °F (36.8 °C) (05/22/20 1140)  Pulse: 96 (05/22/20 1300)  Resp: 20 (05/22/20 1140)  BP: 122/88 (05/22/20 1140)  SpO2: (!) 93 % (05/22/20 1140) Vital Signs (24h Range):  Temp:  [97.5 °F (36.4 °C)-99.2 °F (37.3 °C)] 98.3 °F (36.8 °C)  Pulse:  [] 96  Resp:  [18-24] 20  SpO2:  [93 %-98 %] 93 %  BP: (117-145)/(79-92) 122/88     Weight: 86.9 kg (191 lb 9.3 oz)  Body mass index is 28.29 kg/m².     SpO2: (!) 93 %  O2 Device (Oxygen Therapy): nasal cannula      Intake/Output Summary (Last 24 hours) at 5/22/2020 1337  Last data filed at 5/22/2020 0600  Gross per 24 hour   Intake 255.53 ml   Output 2550 ml   Net -2294.47 ml       Lines/Drains/Airways     Peripheral Intravenous Line                 Peripheral IV - Single Lumen 05/20/20 0810 20 G Left Antecubital 2 days         Peripheral IV - Single Lumen 05/20/20 2240 20 G Left Hand 1 day                Physical Exam   Constitutional: He is oriented to person, place, and time. He appears well-developed and well-nourished. No distress.   HENT:   Head: Normocephalic and atraumatic.   Eyes: Pupils are equal, round, and reactive to light. Right eye exhibits no discharge. Left eye exhibits no discharge.   Neck: Neck supple. No JVD present. No tracheal deviation present.   Cardiovascular: Normal rate, regular rhythm, S1 normal, S2 normal and normal heart sounds.   No murmur heard.  Pulmonary/Chest: Effort normal. No respiratory distress. He has no wheezes.   Diminished BS at bases     Abdominal: Soft. He exhibits no distension.   Musculoskeletal: He exhibits no edema.   Neurological: He is alert and  oriented to person, place, and time.   Skin: Skin is warm and dry. He is not diaphoretic. No erythema.   Psychiatric: He has a normal mood and affect. His behavior is normal. Thought content normal.   Nursing note and vitals reviewed.      Significant Labs:   CMP   Recent Labs   Lab 05/21/20  0355 05/22/20  0707    142  141   K 4.4 3.7  3.7    102  102   CO2 18* 26  27   * 106  105   BUN 31* 40*  36*   CREATININE 2.0* 1.9*  1.8*   CALCIUM 8.8 9.5  9.7   PROT  --  7.6   ALBUMIN 3.2* 3.5  3.5   BILITOT  --  0.5   ALKPHOS  --  135   AST  --  29   ALT  --  13   ANIONGAP 15 14  12   ESTGFRAFRICA 40* 43*  46*   EGFRNONAA 35* 37*  40*   , CBC   Recent Labs   Lab 05/20/20  2240 05/21/20  0355 05/22/20  0707   WBC 13.80* 16.00* 13.65*   HGB 14.2 14.3 15.1   HCT 40.7 42.2 45.2    180 198   , Troponin   Recent Labs   Lab 05/20/20  2240 05/21/20  0840 05/22/20  0707   TROPONINI 2.686* 2.573* 6.891*    and All pertinent lab results from the last 24 hours have been reviewed.    Significant Imaging: Echocardiogram: 2D echo with color flow doppler: No results found for this or any previous visit., EKG: Reviewed and X-Ray: CXR: X-Ray Chest 1 View (CXR): No results found for this visit on 05/20/20. and X-Ray Chest PA and Lateral (CXR): No results found for this visit on 05/20/20.

## 2020-05-22 NOTE — ASSESSMENT & PLAN NOTE
-Presents with acute decompensated combined CHF  -EF 20%  -Continue IV diuresis for additional day  -Continue BB, ACEi  -Add Imdur  -Strict I's/O's  -Ischemic evaluation prior to d/c

## 2020-05-22 NOTE — PROGRESS NOTES
Patient stated that he did not want to wear Bipap tonight- he said he was breathing fine on his nasal cannula. Patient's SpO2 is 96% on 2LNC and there is no distress noted at this time. I instructed the patient to contact Respiratory if he felt SOB or decided to wear Bipap.

## 2020-05-22 NOTE — ASSESSMENT & PLAN NOTE
-Troponin > 6 this AM, repeat this afternoon  -Continue ASA, Plavix, BB, statin, ACEi  -Add Imdur 30 mg daily  -Continue heparin gtt for additional day  -EF 20%  -Will need ischemic evaluation with probable LHC pending clinical course

## 2020-05-22 NOTE — ASSESSMENT & PLAN NOTE
05/22/2020  ASSESSMENT: RESOLVED.  Recent Labs   Lab 05/20/20  0811 05/20/20  2240 05/21/20  1151   LACTATE 6.3* 2.3* 1.8

## 2020-05-22 NOTE — PROGRESS NOTES
[facility] -   Pulmonology  Progress Note    Patient Name: Ceasar Hernandez  MRN: [id number]  Admission Date: 5/20/2020  Hospital Length of Stay: 2 days  Code Status: Full Code  Attending Provider: REGIS Delgado MD  Primary Care Provider: Provider [facility]   Principal Problem: Acute combined systolic and diastolic CHF, NYHA class 4    Subjective: slow improvment     Past Medical History:   Diagnosis Date    COPD (chronic obstructive pulmonary disease)     Gout     Hypertension     NSTEMI (non-ST elevated myocardial infarction) 5/20/2020       Past Surgical History:   Procedure Laterality Date    FRACTURE SURGERY         Review of patient's allergies indicates:  No Known Allergies    Family History     None        Tobacco Use    Smoking status: Current Every Day Smoker     Packs/day: 1.00     Years: 40.00     Pack years: 40.00     Types: Cigarettes     Start date: 1/1/1979    Smokeless tobacco: Never Used   Substance and Sexual Activity    Alcohol use: Yes     Comment:  PINT/DAILY    Drug use: Not Currently    Sexual activity: Not Currently     Partners: Female         Review of Systems   Constitutional: Positive for activity change and diaphoresis.   HENT: Positive for postnasal drip and rhinorrhea.    Respiratory: Positive for cough, shortness of breath and wheezing.    Cardiovascular: Positive for chest pain, palpitations and leg swelling.        Paroxysmal nocturnal dyspnea     Gastrointestinal: Negative.    Endocrine: Negative.    Genitourinary: Negative.    Musculoskeletal: Positive for back pain.   Skin: Negative.    Allergic/Immunologic: Negative.    Neurological: Positive for weakness.   Hematological: Negative.      Objective:     Vital Signs (Most Recent):  Temp: 98.3 °F (36.8 °C) (05/22/20 1530)  Pulse: 96 (05/22/20 1530)  Resp: 20 (05/22/20 1530)  BP: 119/74 (05/22/20 1530)  SpO2: (!) 94 % (05/22/20 1530) Vital Signs (24h Range):  Temp:  [97.5 °F (36.4 °C)-98.3 °F (36.8 °C)]  98.3 °F (36.8 °C)  Pulse:  [] 96  Resp:  [18-20] 20  SpO2:  [93 %-98 %] 94 %  BP: (117-134)/(74-88) 119/74     Weight: 86.9 kg (191 lb 9.3 oz)  Body mass index is 28.29 kg/m².      Intake/Output Summary (Last 24 hours) at 5/22/2020 1722  Last data filed at 5/22/2020 1600  Gross per 24 hour   Intake 723.07 ml   Output 2575 ml   Net -1851.93 ml       Physical Exam   Constitutional: He is oriented to person, place, and time. He appears well-developed and well-nourished.   HENT:   Head: Normocephalic and atraumatic.   Eyes: Pupils are equal, round, and reactive to light. Conjunctivae are normal.   Neck: Neck supple. JVD present. No tracheal deviation present. No thyromegaly present.   Cardiovascular: Normal rate. A regularly irregular rhythm present. PMI is displaced.   Murmur heard.   Systolic murmur is present with a grade of 2/6.  Pulmonary/Chest: Effort normal. He has decreased breath sounds. He has wheezes. He has rales in the right lower field and the left lower field.   Abdominal: Soft.   Musculoskeletal: Normal range of motion. He exhibits edema.   Lymphadenopathy:     He has no cervical adenopathy.   Neurological: He is alert and oriented to person, place, and time.   Skin: Skin is warm and dry.   Nursing note and vitals reviewed.      Vents:  Oxygen Concentration (%): 3 (05/21/20 0828)    Lines/Drains/Airways     Peripheral Intravenous Line                 Peripheral IV - Single Lumen 05/20/20 0810 20 G Left Antecubital 2 days         Peripheral IV - Single Lumen 05/20/20 2240 20 G Left Hand 1 day                Significant Labs:    CBC/Anemia Profile:  Recent Labs   Lab 05/20/20 2240 05/21/20  0355 05/22/20  0707   WBC 13.80* 16.00* 13.65*   HGB 14.2 14.3 15.1   HCT 40.7 42.2 45.2    180 198   MCV 93 94 95   RDW 17.5* 17.6* 17.8*        Chemistries:  Recent Labs   Lab 05/20/20 2240 05/21/20  0355 05/22/20  0707   NA  --  140 142  141   K  --  4.4 3.7  3.7   CL  --  107 102  102   CO2  --   18* 26  27   BUN  --  31* 40*  36*   CREATININE  --  2.0* 1.9*  1.8*   CALCIUM  --  8.8 9.5  9.7   ALBUMIN  --  3.2* 3.5  3.5   PROT  --   --  7.6   BILITOT  --   --  0.5   ALKPHOS  --   --  135   ALT  --   --  13   AST  --   --  29   MG 1.9  --   --    PHOS 4.0 4.0 3.1       BMP:   Recent Labs   Lab 05/20/20  2240  05/22/20  0707   GLU  --    < > 106  105   NA  --    < > 142  141   K  --    < > 3.7  3.7   CL  --    < > 102  102   CO2  --    < > 26  27   BUN  --    < > 40*  36*   CREATININE  --    < > 1.9*  1.8*   CALCIUM  --    < > 9.5  9.7   MG 1.9  --   --     < > = values in this interval not displayed.     CMP:   Recent Labs   Lab 05/21/20  0355 05/22/20  0707    142  141   K 4.4 3.7  3.7    102  102   CO2 18* 26  27   * 106  105   BUN 31* 40*  36*   CREATININE 2.0* 1.9*  1.8*   CALCIUM 8.8 9.5  9.7   PROT  --  7.6   ALBUMIN 3.2* 3.5  3.5   BILITOT  --  0.5   ALKPHOS  --  135   AST  --  29   ALT  --  13   ANIONGAP 15 14  12   EGFRNONAA 35* 37*  40*     All pertinent labs within the past 24 hours have been reviewed.    Significant Imaging:   I have reviewed all pertinent imaging results/findings within the past 24 hours.  I have reviewed and interpreted all pertinent imaging results/findings within the past 24 hours.     X-Ray Chest AP Portable  Narrative: EXAMINATION:  XR CHEST AP PORTABLE    CLINICAL HISTORY:  follow up CHF; worsening leukocytosis;.    TECHNIQUE:  Single frontal portable view of the chest was performed.    COMPARISON:  05/20/2020    FINDINGS:  Support devices: None    Compared to 05/20/2020, unchanged appearance of the chest.  Right apical emphysematous bleb.    Unchanged moderate cardiomegaly.  No pleural effusion or pneumothorax.    Distal right clavicular reconstruction plate.  Impression: Unchanged appearance of the chest compared to 05/20/2020.    Electronically signed by: Jose De Paz  Date:    05/21/2020  Time:    13:01          AB  [facility]    Lab 05/20/20  0827   PH 7.185*   PO2 82   PCO2 46.7*   HCO3 17.6*   BE -11     Assessment/Plan:     Panlobular emphysema  5/22/2020 short acting B2-agonist, anticholinergic      Respiratory distress, acute  Needs evaluation for home oxygen prior to discharge         Iglesia Montenegro MD  Pulmonology  [facility] -

## 2020-05-22 NOTE — SUBJECTIVE & OBJECTIVE
Interval History: Admitted 05/20/2020 to ICU for NSTEMI and Acute Combined Systolic and Diastolic heart failure. ECHO showed EF 20% and severe diastolic dysfunction. He stabilized and was transferred out of ICU to telemetry. Cardiology following. CV Meds include carvedilol, lisinopril, furosemide, isosorbide mononitrate, atorvastatin, and clopidogrel, aspirin. Symptoms MUCH improved. No present chest pain. Breathing much more comfortably. Good diuresis. Anticipating cardiac catheterization when more stable, probably Monday.    Review of Systems   Constitutional: Negative for chills and fever.   Eyes: Negative for visual disturbance.   Respiratory: Chest tightness:  minimal. Shortness of breath:  much improved.    Cardiovascular: Negative for chest pain, palpitations and leg swelling.   Gastrointestinal: Negative for abdominal pain.   Psychiatric/Behavioral: Negative for confusion and hallucinations. Dysphoric mood:  mild. The patient is nervous/anxious.      Objective:     Vital Signs (Most Recent):  Temp: 98.3 °F (36.8 °C) (05/22/20 1530)  Pulse: 96 (05/22/20 1530)  Resp: 20 (05/22/20 1530)  BP: 119/74 (05/22/20 1530)  SpO2: (!) 94 % (05/22/20 1530) Vital Signs (24h Range):  Temp:  [97.5 °F (36.4 °C)-98.3 °F (36.8 °C)] 98.3 °F (36.8 °C)  Pulse:  [] 96  Resp:  [18-20] 20  SpO2:  [93 %-98 %] 94 %  BP: (117-134)/(74-88) 119/74     Weight: 86.9 kg (191 lb 9.3 oz)  Body mass index is 28.29 kg/m².    Intake/Output Summary (Last 24 hours) at 5/22/2020 1649  Last data filed at 5/22/2020 1600  Gross per 24 hour   Intake 723.07 ml   Output 2575 ml   Net -1851.93 ml      Physical Exam   Constitutional: He is oriented to person, place, and time. He appears well-nourished.   HENT:   Mouth/Throat: Oropharyngeal exudate present.   Eyes: EOM are normal.   Cardiovascular: Normal rate and regular rhythm.   Pulmonary/Chest: Effort normal. No stridor. No respiratory distress. He has wheezes.   Good air movement   Abdominal:  Soft. There is no tenderness.   Neurological: He is alert and oriented to person, place, and time. No cranial nerve deficit.   Skin: Skin is warm and dry.   Psychiatric: He has a normal mood and affect. His behavior is normal. Judgment normal.   Nursing note and vitals reviewed.      Significant Labs: All pertinent labs within the past 24 hours have been reviewed.    Significant Imaging: I have reviewed all pertinent imaging results/findings within the past 24 hours.

## 2020-05-22 NOTE — PROGRESS NOTES
[facility] Hospital Medicine  Progress Note    Patient Name: Ceasar Hernandez  MRN: [id number]  Patient Class: IP- Inpatient   Admission Date: 5/20/2020  Length of Stay: 2 days  Attending Physician: REGIS Delgado MD  Primary Care Provider: Provider Notinsystem        Subjective:     Principal Problem:Acute combined systolic and diastolic CHF, NYHA class 4        HPI:  Pt is a 59 yo male with PMhx of COPD and HTN who was admitted to ICU in early AM of 5/21/2020 for Acute Respiratory Failure requiring BIPAP and decompensated combined heart failure. Pt reported WINSTON and lightheadedness. COVID 19 negative. Troponins were elevated 1.180 > 2.686 > 2.573 and Cardiology recommended heparin infusion. Pt was in acute CHF exacerbation and 2 d ECHO showed LVEF 20 % and diastolic dysfunction. Diuresis with IV lasix given. Initially, pt required supplemental oxygen via NIPPV which has thus been weaned. Also, pt received Plavix, BB, ACE inhibitor, ASA and STATIN. Pt's respiratory status improved and he was downgraded to Telemetry unit.     Overview/Hospital Course:  05/22/2020 Admitted 05/20/2020 to ICU for NSTEMI and Acute Combined Systolic and Diastolic heart failure. ECHO showed EF 20% and severe diastolic dysfunction. He stabilized and was transferred out of ICU to telemetry. Cardiology following. CV Meds include carvedilol, lisinopril, furosemide, isosorbide mononitrate, atorvastatin, and clopidogrel, aspirin. Symptoms MUCH improved. No present chest pain. Breathing much more comfortably. Good diuresis. Anticipating cardiac catheterization when more stable, probably Monday.    Interval History: Admitted 05/20/2020 to ICU for NSTEMI and Acute Combined Systolic and Diastolic heart failure. ECHO showed EF 20% and severe diastolic dysfunction. He stabilized and was transferred out of ICU to telemetry. Cardiology following. CV Meds include carvedilol, lisinopril, furosemide, isosorbide mononitrate, atorvastatin,  and clopidogrel, aspirin. Symptoms MUCH improved. No present chest pain. Breathing much more comfortably. Good diuresis. Anticipating cardiac catheterization when more stable, probably Monday.    Review of Systems   Constitutional: Negative for chills and fever.   Eyes: Negative for visual disturbance.   Respiratory: Chest tightness:  minimal. Shortness of breath:  much improved.    Cardiovascular: Negative for chest pain, palpitations and leg swelling.   Gastrointestinal: Negative for abdominal pain.   Psychiatric/Behavioral: Negative for confusion and hallucinations. Dysphoric mood:  mild. The patient is nervous/anxious.      Objective:     Vital Signs (Most Recent):  Temp: 98.3 °F (36.8 °C) (05/22/20 1530)  Pulse: 96 (05/22/20 1530)  Resp: 20 (05/22/20 1530)  BP: 119/74 (05/22/20 1530)  SpO2: (!) 94 % (05/22/20 1530) Vital Signs (24h Range):  Temp:  [97.5 °F (36.4 °C)-98.3 °F (36.8 °C)] 98.3 °F (36.8 °C)  Pulse:  [] 96  Resp:  [18-20] 20  SpO2:  [93 %-98 %] 94 %  BP: (117-134)/(74-88) 119/74     Weight: 86.9 kg (191 lb 9.3 oz)  Body mass index is 28.29 kg/m².    Intake/Output Summary (Last 24 hours) at 5/22/2020 1649  Last data filed at 5/22/2020 1600  Gross per 24 hour   Intake 723.07 ml   Output 2575 ml   Net -1851.93 ml      Physical Exam   Constitutional: He is oriented to person, place, and time. He appears well-nourished.   HENT:   Mouth/Throat: Oropharyngeal exudate present.   Eyes: EOM are normal.   Cardiovascular: Normal rate and regular rhythm.   Pulmonary/Chest: Effort normal. No stridor. No respiratory distress. He has wheezes.   Good air movement   Abdominal: Soft. There is no tenderness.   Neurological: He is alert and oriented to person, place, and time. No cranial nerve deficit.   Skin: Skin is warm and dry.   Psychiatric: He has a normal mood and affect. His behavior is normal. Judgment normal.   Nursing note and vitals reviewed.      Significant Labs: All pertinent labs within the past 24  hours have been reviewed.    Significant Imaging: I have reviewed all pertinent imaging results/findings within the past 24 hours.      Assessment/Plan:      * Acute combined systolic and diastolic CHF, NYHA class 4  Echocardiogram 05/21/2020  ·Severely decreased left ventricular systolic function. The estimated ejection fraction is 20%.  ·Grade III (severe) left ventricular diastolic dysfunction consistent with restrictive physiology.  ·Normal right ventricular systolic function.  ·Concentric left ventricular hypertrophy.    05/22/2020  ASSESSMENT: He has Combined Systolic and Diastolic heart failure that is Acute (possibly acute on chronic). Latest ECHO showed EF 20% and severe diastolic dysfunction. Followed by cardiology. CV Meds include carvedilol, lisinopril, furosemide, isosorbide mononitrate, atorvastatin, and clopidogrel, aspirin. Symptoms improved. Good diuresis.  PLAN:  Continue management per cardiology.   Temp:  [97.5 °F (36.4 °C)-99.2 °F (37.3 °C)] 98.3 °F (36.8 °C)  Pulse:  [] 96  Resp:  [18-24] 20  SpO2:  [93 %-98 %] 94 %  BP: (117-145)/(74-92) 119/74   Diuretics (From admission, onward)    Start     Stop Route Frequency Ordered    05/20/20 2215  furosemide injection 40 mg      -- IV Every 12 hours (non-standard times) 05/20/20 2102           Intake/Output Summary (Last 24 hours) at 5/22/2020 1554  Last data filed at 5/22/2020 0600  Gross per 24 hour   Intake 231.37 ml   Output 2250 ml   Net -2018.63 ml     Patient Vitals for the past 72 hrs (Last 3 readings):   Weight   05/22/20 0342 86.9 kg (191 lb 9.3 oz)   05/21/20 0235 86.1 kg (189 lb 13.1 oz)   05/20/20 1048 89.4 kg (197 lb)      Recent Labs   Lab 05/20/20  0811 05/20/20  2240 05/21/20  0355 05/22/20  0707   BNP 1,197*  --   --  1,127*   BUN 20  --  31* 40*  36*   CREATININE 2.1*  --  2.0* 1.9*  1.8*   ESTGFRAFRICA 38*  --  40* 43*  46*   EGFRNONAA 33*  --  35* 37*  40*     --  140 142  141   K 4.4  --  4.4 3.7  3.7      --  107 102  102   CO2 16*  --  18* 26  27   PHOS  --  4.0 4.0 3.1   MG  --  1.9  --   --    HGB 15.5 14.2 14.3 15.1   HCT 46.7 40.7 42.2 45.2         Anxiety  05/22/2020  ASSESSMENT: Anxiety about health, exacerbated by smoking cessation and insomnia. [facility] Controlled Prescription Drug Monitoring database was queried and showed no activity to suggest abuse, diversion, or other inappropriate use of prescription medications.  PLAN:  Alprazolam PRN.        Leukocytosis  05/22/2020  ASSESSMENT: No infectious source. Could be reaction to corticosteroids or stress.   PLAN:  Monitor.    Recent Labs   Lab 05/20/20  0811 05/20/20  2240 05/21/20  0355 05/22/20  0707   WBC 13.83* 13.80* 16.00* 13.65*     Microbiology Results (last 7 days)     Procedure Component Value Units Date/Time    Blood culture x two cultures. Draw prior to antibiotics. [[id number]] Collected:  05/20/20 0814    Order Status:  Completed Specimen:  Blood from Peripheral, Antecubital, Right Updated:  05/22/20 1612     Blood Culture, Routine No Growth to date      No Growth to date      No Growth to date    Narrative:       Aerobic and anaerobic    Blood culture x two cultures. Draw prior to antibiotics. [[id number]] Collected:  05/20/20 0811    Order Status:  Completed Specimen:  Blood from Peripheral, Antecubital, Left Updated:  05/22/20 1612     Blood Culture, Routine No Growth to date      No Growth to date      No Growth to date    Narrative:       Aerobic and anaerobic         Antibiotics (From admission, onward)    None           Respiratory distress, acute  05/22/2020 RESOLVED.    Smoking history  05/22/2020  ASSESSMENT: He is currently at the action (quitting) stage of smoking cessation. Smoking cessation encouraged.    PLAN:  Continue nicotine patch. Referral to tobacco cessation program after discharge.      Lactic acidosis  05/22/2020  ASSESSMENT: RESOLVED.  Recent Labs   Lab 05/20/20  0811 05/20/20 2240 05/21/20  1151    LACTATE 6.3* 2.3* 1.8        COPD (chronic obstructive pulmonary disease)  05/22/2020  ASSESSMENT: Chronic condition. Maintaining SpO2 on room air.   PLAN:  Continue present treatment plan.        Essential hypertension  05/22/2020  ASSESSMENT: Controlled.   PLAN:  Continue present treatment plan.  Temp:  [97.5 °F (36.4 °C)-99.2 °F (37.3 °C)] 98.3 °F (36.8 °C)  Pulse:  [] 96  Resp:  [18-20] 20  SpO2:  [93 %-98 %] 94 %  BP: (117-145)/(74-92) 119/74        NSTEMI (non-ST elevated myocardial infarction)  05/22/2020  ASSESSMENT: No chest pain at present. Followed by cardiology. CV Meds include heparin, carvedilol, lisinopril, furosemide, isosorbide mononitrate, atorvastatin, aspirin. PLAN:  Continue management per cardiology.   Recent Labs   Lab 05/20/20  2240 05/21/20  0840 05/22/20  0707   TROPONINI 2.686* 2.573* 6.891*     Results for orders placed or performed during the hospital encounter of 05/20/20   EKG 12-lead    Collection Time: 05/21/20  5:55 AM    Narrative    Test Reason :     Vent. Rate : 110 BPM     Atrial Rate : 110 BPM     P-R Int : 176 ms          QRS Dur : 116 ms      QT Int : 372 ms       P-R-T Axes : 060 057 129 degrees     QTc Int : 503 ms    Program found technically poor ECG  Sinus tachycardia  Possible Left atrial enlargement  ST and T wave abnormality, consider lateral ischemia  Abnormal ECG  When compared with ECG of 20-MAY-2020 09:00,  Nonspecific T wave abnormality has replaced inverted T waves in Inferior  leads  Confirmed by BOWEN ASIF MD ([id number]) on 5/21/2020 8:51:28 PM    Referred By: [email or web address]   SELF           Confirmed By:BOWEN ASIF MD          Abnormal ECG          VTE Risk Mitigation (From admission, onward)         Ordered     heparin 25,000 units in dextrose 5% 250 mL (100 units/mL) infusion LOW INTENSITY nomogram - [facility]  Continuous     Question:  Heparin Infusion Adjustment (DO NOT MODIFY ANSWER)  Answer:  [email or web address]\epic\Images\Pharmacy\HeparinInfusions\heparin LOW  INTENSITY nomogram for [facility] [id number]    05/20/20 2103     heparin 25,000 units in dextrose 5% (100 units/ml) IV bolus from bag - ADDITIONAL PRN BOLUS - 60 units/kg (max bolus 4000 units)  As needed (PRN)     Question:  Heparin Infusion Adjustment (DO NOT MODIFY ANSWER)  Answer:  \\[email or web address]\epic\Images\Pharmacy\HeparinInfusions\heparin LOW INTENSITY nomogram for [facility] [id number].pdf    05/20/20 2103     heparin 25,000 units in dextrose 5% (100 units/ml) IV bolus from bag - ADDITIONAL PRN BOLUS - 30 units/kg (max bolus 4000 units)  As needed (PRN)     Question:  Heparin Infusion Adjustment (DO NOT MODIFY ANSWER)  Answer:  \\[email or web address]\epic\Images\Pharmacy\HeparinInfusions\heparin LOW INTENSITY nomogram for [facility] [id number].pdf    05/20/20 2103     IP VTE HIGH RISK PATIENT  Once      05/20/20 2058     Place sequential compression device  Until discontinued      05/20/20 2058                      MACIEL Delgado MD  Department of Hospital Medicine   [facility]

## 2020-05-22 NOTE — ASSESSMENT & PLAN NOTE
05/22/2020  ASSESSMENT: He has Combined Systolic and Diastolic heart failure that is Acute (possibly acute on chronic). Latest ECHO showed EF 20% and severe diastolic dysfunction. Followed by cardiology. CV Meds include carvedilol, lisinopril, furosemide, isosorbide mononitrate, atorvastatin, and clopidogrel, aspirin. Symptoms improved. Good diuresis.  PLAN:  Continue management per cardiology.   Temp:  [97.5 °F (36.4 °C)-99.2 °F (37.3 °C)] 98.3 °F (36.8 °C)  Pulse:  [] 96  Resp:  [18-24] 20  SpO2:  [93 %-98 %] 94 %  BP: (117-145)/(74-92) 119/74   Diuretics (From admission, onward)    Start     Stop Route Frequency Ordered    05/20/20 2215  furosemide injection 40 mg      -- IV Every 12 hours (non-standard times) 05/20/20 2102           Intake/Output Summary (Last 24 hours) at 5/22/2020 1554  Last data filed at 5/22/2020 0600  Gross per 24 hour   Intake 231.37 ml   Output 2250 ml   Net -2018.63 ml     Patient Vitals for the past 72 hrs (Last 3 readings):   Weight   05/22/20 0342 86.9 kg (191 lb 9.3 oz)   05/21/20 0235 86.1 kg (189 lb 13.1 oz)   05/20/20 1048 89.4 kg (197 lb)      Recent Labs   Lab 05/20/20  0811 05/20/20  2240 05/21/20  0355 05/22/20  0707   BNP 1,197*  --   --  1,127*   BUN 20  --  31* 40*  36*   CREATININE 2.1*  --  2.0* 1.9*  1.8*   ESTGFRAFRICA 38*  --  40* 43*  46*   EGFRNONAA 33*  --  35* 37*  40*     --  140 142  141   K 4.4  --  4.4 3.7  3.7     --  107 102  102   CO2 16*  --  18* 26  27   PHOS  --  4.0 4.0 3.1   MG  --  1.9  --   --    HGB 15.5 14.2 14.3 15.1   HCT 46.7 40.7 42.2 45.2

## 2020-05-22 NOTE — HOSPITAL COURSE
05/22/2020 Admitted 05/20/2020 to ICU for NSTEMI and Acute Combined Systolic and Diastolic heart failure. ECHO showed EF 20% and severe diastolic dysfunction. He stabilized and was transferred out of ICU to telemetry. Cardiology following. CV Meds include carvedilol, lisinopril, furosemide, isosorbide mononitrate, atorvastatin, and clopidogrel, aspirin. Symptoms MUCH improved. No present chest pain. Breathing much more comfortably. Good diuresis. Anticipating cardiac catheterization when more stable, probably Monday.    5/25/20- LHC postponed by Cardiology this  morning due to concern over tremors  or shakes . Last drink was 5 days ago. Pt denies any H/O delirium tremens or alcohol withdrawal  in the past , states he can go days without drinking. States he was shaking due to room being too cold. Vitals are stable . Pt is awake , alert and oriented x 3 and situation. Labs resulted Na 140, K 3.8, BUN 36, Creatinine 2.0.     5/26- Pt has no C/O today . Tremor or shakes are better . Creatinine is down to 1.6 . Vitals are stable . LHC today     5/27- S/P LHC/RHC yesterday . Noted severe 3 vessels CAD and CT Surgery was consulted . After careful evaluation CT surgery suggested that the patient with severe multivessel coronary artery disease with an EF of 15 to 20% % is not a candidate for coronary artery bypass grafting due to poor coronary targets. Cardiology will optimize medical therapy. Consult PT/OT before planning discharge.     5/28- Afebrile . No c/o today . Feels better overall . Vitals are stable . On RA. C/O loose stools  but wants to think it is due to chocolate milk. Denies chest pain, N/V , diaphoresis , abd pain. Labs resulted Na 140, K 3.6, creatinine increased to 1.9 from 1.6. Ranexa added to medication regimen . Awaiting Life Vest.     5/29- Life Vest arrived and applied . Pt is deemed medically stable to be discharged home with life vest in place .

## 2020-05-22 NOTE — PLAN OF CARE
No significant events throughout shift. Pt able to make needs and pain known to staff. Vss. Resp even and non labored. 0 s/s acute distress noted. Pt NSR to ST on cardiac monitor. Pt denies needs. Heparin gtt tirated per protocol and labs monitored. Safety intact. Bed in low position. Call light in reach sr up x2. Will cont to monitor and eval throughout shift.   Problem: Pain (Acute Coronary Syndrome)  Goal: Absence of Cardiac-Related Pain  Outcome: Ongoing, Progressing     Problem: Fluid Imbalance (Heart Failure)  Goal: Fluid Balance  Outcome: Ongoing, Progressing     Problem: Oral Intake Inadequate (Heart Failure)  Goal: Optimal Nutrition Intake  Outcome: Ongoing, Progressing

## 2020-05-23 PROBLEM — I73.9 PAD (PERIPHERAL ARTERY DISEASE): Status: ACTIVE | Noted: 2020-05-23

## 2020-05-23 PROBLEM — F10.10 ETOH ABUSE: Status: ACTIVE | Noted: 2020-05-23

## 2020-05-23 LAB
ALBUMIN SERPL BCP-MCNC: 3.3 G/DL (ref 3.5–5.2)
ALBUMIN SERPL BCP-MCNC: 3.4 G/DL (ref 3.5–5.2)
ALP SERPL-CCNC: 136 U/L (ref 55–135)
ALT SERPL W/O P-5'-P-CCNC: 12 U/L (ref 10–44)
ANION GAP SERPL CALC-SCNC: 13 MMOL/L (ref 8–16)
ANION GAP SERPL CALC-SCNC: 13 MMOL/L (ref 8–16)
APTT BLDCRRT: 47.2 SEC (ref 21–32)
AST SERPL-CCNC: 18 U/L (ref 10–40)
BASOPHILS # BLD AUTO: 0.08 K/UL (ref 0–0.2)
BASOPHILS NFR BLD: 0.7 % (ref 0–1.9)
BILIRUB SERPL-MCNC: 0.3 MG/DL (ref 0.1–1)
BUN SERPL-MCNC: 44 MG/DL (ref 8–23)
BUN SERPL-MCNC: 45 MG/DL (ref 8–23)
CALCIUM SERPL-MCNC: 9 MG/DL (ref 8.7–10.5)
CALCIUM SERPL-MCNC: 9.2 MG/DL (ref 8.7–10.5)
CHLORIDE SERPL-SCNC: 102 MMOL/L (ref 95–110)
CHLORIDE SERPL-SCNC: 102 MMOL/L (ref 95–110)
CO2 SERPL-SCNC: 25 MMOL/L (ref 23–29)
CO2 SERPL-SCNC: 25 MMOL/L (ref 23–29)
CREAT SERPL-MCNC: 2 MG/DL (ref 0.5–1.4)
CREAT SERPL-MCNC: 2.1 MG/DL (ref 0.5–1.4)
DIFFERENTIAL METHOD: ABNORMAL
EOSINOPHIL # BLD AUTO: 0.1 K/UL (ref 0–0.5)
EOSINOPHIL NFR BLD: 0.7 % (ref 0–8)
ERYTHROCYTE [DISTWIDTH] IN BLOOD BY AUTOMATED COUNT: 17.6 % (ref 11.5–14.5)
EST. GFR  (AFRICAN AMERICAN): 38 ML/MIN/1.73 M^2
EST. GFR  (AFRICAN AMERICAN): 40 ML/MIN/1.73 M^2
EST. GFR  (NON AFRICAN AMERICAN): 33 ML/MIN/1.73 M^2
EST. GFR  (NON AFRICAN AMERICAN): 35 ML/MIN/1.73 M^2
GLUCOSE SERPL-MCNC: 103 MG/DL (ref 70–110)
GLUCOSE SERPL-MCNC: 103 MG/DL (ref 70–110)
HCT VFR BLD AUTO: 44.9 % (ref 40–54)
HGB BLD-MCNC: 14.8 G/DL (ref 14–18)
IMM GRANULOCYTES # BLD AUTO: 0.12 K/UL (ref 0–0.04)
IMM GRANULOCYTES NFR BLD AUTO: 1.1 % (ref 0–0.5)
LYMPHOCYTES # BLD AUTO: 2.1 K/UL (ref 1–4.8)
LYMPHOCYTES NFR BLD: 18.9 % (ref 18–48)
MCH RBC QN AUTO: 31.4 PG (ref 27–31)
MCHC RBC AUTO-ENTMCNC: 33 G/DL (ref 32–36)
MCV RBC AUTO: 95 FL (ref 82–98)
MONOCYTES # BLD AUTO: 1 K/UL (ref 0.3–1)
MONOCYTES NFR BLD: 9 % (ref 4–15)
NEUTROPHILS # BLD AUTO: 7.7 K/UL (ref 1.8–7.7)
NEUTROPHILS NFR BLD: 69.6 % (ref 38–73)
NRBC BLD-RTO: 0 /100 WBC
PHOSPHATE SERPL-MCNC: 4.1 MG/DL (ref 2.7–4.5)
PLATELET # BLD AUTO: 179 K/UL (ref 150–350)
PMV BLD AUTO: 12.4 FL (ref 9.2–12.9)
POTASSIUM SERPL-SCNC: 3.5 MMOL/L (ref 3.5–5.1)
POTASSIUM SERPL-SCNC: 3.5 MMOL/L (ref 3.5–5.1)
PROT SERPL-MCNC: 7.3 G/DL (ref 6–8.4)
RBC # BLD AUTO: 4.71 M/UL (ref 4.6–6.2)
SODIUM SERPL-SCNC: 140 MMOL/L (ref 136–145)
SODIUM SERPL-SCNC: 140 MMOL/L (ref 136–145)
TROPONIN I SERPL DL<=0.01 NG/ML-MCNC: 5.88 NG/ML (ref 0–0.03)
WBC # BLD AUTO: 11.01 K/UL (ref 3.9–12.7)

## 2020-05-23 PROCEDURE — 85730 THROMBOPLASTIN TIME PARTIAL: CPT

## 2020-05-23 PROCEDURE — 63600175 PHARM REV CODE 636 W HCPCS: Performed by: INTERNAL MEDICINE

## 2020-05-23 PROCEDURE — 99233 PR SUBSEQUENT HOSPITAL CARE,LEVL III: ICD-10-PCS | Mod: ,,, | Performed by: INTERNAL MEDICINE

## 2020-05-23 PROCEDURE — 99233 SBSQ HOSP IP/OBS HIGH 50: CPT | Mod: ,,, | Performed by: INTERNAL MEDICINE

## 2020-05-23 PROCEDURE — 36415 COLL VENOUS BLD VENIPUNCTURE: CPT

## 2020-05-23 PROCEDURE — 25000003 PHARM REV CODE 250: Performed by: INTERNAL MEDICINE

## 2020-05-23 PROCEDURE — 94761 N-INVAS EAR/PLS OXIMETRY MLT: CPT

## 2020-05-23 PROCEDURE — 80069 RENAL FUNCTION PANEL: CPT

## 2020-05-23 PROCEDURE — 25000003 PHARM REV CODE 250: Performed by: PHYSICIAN ASSISTANT

## 2020-05-23 PROCEDURE — 25000242 PHARM REV CODE 250 ALT 637 W/ HCPCS: Performed by: INTERNAL MEDICINE

## 2020-05-23 PROCEDURE — 21400001 HC TELEMETRY ROOM

## 2020-05-23 PROCEDURE — 80053 COMPREHEN METABOLIC PANEL: CPT

## 2020-05-23 PROCEDURE — 84484 ASSAY OF TROPONIN QUANT: CPT

## 2020-05-23 PROCEDURE — S4991 NICOTINE PATCH NONLEGEND: HCPCS | Performed by: INTERNAL MEDICINE

## 2020-05-23 PROCEDURE — 85025 COMPLETE CBC W/AUTO DIFF WBC: CPT

## 2020-05-23 PROCEDURE — 94640 AIRWAY INHALATION TREATMENT: CPT

## 2020-05-23 RX ORDER — FUROSEMIDE 40 MG/1
40 TABLET ORAL DAILY
Status: DISCONTINUED | OUTPATIENT
Start: 2020-05-23 | End: 2020-05-24

## 2020-05-23 RX ADMIN — ATORVASTATIN CALCIUM 40 MG: 40 TABLET, FILM COATED ORAL at 08:05

## 2020-05-23 RX ADMIN — Medication 1 PATCH: at 08:05

## 2020-05-23 RX ADMIN — ASPIRIN 81 MG: 81 TABLET, COATED ORAL at 08:05

## 2020-05-23 RX ADMIN — CARVEDILOL 3.12 MG: 3.12 TABLET, FILM COATED ORAL at 08:05

## 2020-05-23 RX ADMIN — FUROSEMIDE 40 MG: 40 TABLET ORAL at 11:05

## 2020-05-23 RX ADMIN — IPRATROPIUM BROMIDE 0.5 MG: 0.5 SOLUTION RESPIRATORY (INHALATION) at 08:05

## 2020-05-23 RX ADMIN — LISINOPRIL 5 MG: 5 TABLET ORAL at 08:05

## 2020-05-23 RX ADMIN — CLOPIDOGREL BISULFATE 75 MG: 75 TABLET ORAL at 08:05

## 2020-05-23 RX ADMIN — HEPARIN SODIUM 19 UNITS/KG/HR: 10000 INJECTION, SOLUTION INTRAVENOUS at 03:05

## 2020-05-23 RX ADMIN — ISOSORBIDE MONONITRATE 30 MG: 30 TABLET, EXTENDED RELEASE ORAL at 08:05

## 2020-05-23 RX ADMIN — ALBUTEROL SULFATE 2.5 MG: 2.5 SOLUTION RESPIRATORY (INHALATION) at 08:05

## 2020-05-23 NOTE — SUBJECTIVE & OBJECTIVE
Past Medical History:   Diagnosis Date    COPD (chronic obstructive pulmonary disease)     Gout     Hypertension     NSTEMI (non-ST elevated myocardial infarction) 5/20/2020       Past Surgical History:   Procedure Laterality Date    FRACTURE SURGERY         Review of patient's allergies indicates:  No Known Allergies    Family History     None        Tobacco Use    Smoking status: Current Every Day Smoker     Packs/day: 1.00     Years: 40.00     Pack years: 40.00     Types: Cigarettes     Start date: 1/1/1979    Smokeless tobacco: Never Used   Substance and Sexual Activity    Alcohol use: Yes     Comment:  PINT/DAILY    Drug use: Not Currently    Sexual activity: Not Currently     Partners: Female         Review of Systems   Constitutional: Positive for activity change and diaphoresis.   HENT: Positive for postnasal drip and rhinorrhea.    Respiratory: Positive for cough, shortness of breath and wheezing.    Cardiovascular: Positive for chest pain, palpitations and leg swelling.        Paroxysmal nocturnal dyspnea     Gastrointestinal: Negative.    Endocrine: Negative.    Genitourinary: Negative.    Musculoskeletal: Positive for back pain.   Skin: Negative.    Allergic/Immunologic: Negative.    Neurological: Positive for weakness.   Hematological: Negative.      Objective:     Vital Signs (Most Recent):  Temp: 98.6 °F (37 °C) (05/23/20 1534)  Pulse: 94 (05/23/20 1534)  Resp: 18 (05/23/20 1534)  BP: 121/78 (05/23/20 1534)  SpO2: 95 % (05/23/20 1534) Vital Signs (24h Range):  Temp:  [96.9 °F (36.1 °C)-98.6 °F (37 °C)] 98.6 °F (37 °C)  Pulse:  [] 94  Resp:  [16-20] 18  SpO2:  [93 %-97 %] 95 %  BP: (101-132)/(64-81) 121/78     Weight: 88.2 kg (194 lb 7.1 oz)  Body mass index is 28.71 kg/m².      Intake/Output Summary (Last 24 hours) at 5/23/2020 1842  Last data filed at 5/23/2020 1700  Gross per 24 hour   Intake 840.5 ml   Output 1125 ml   Net -284.5 ml       Physical Exam   Constitutional: He is  oriented to person, place, and time. He appears well-developed and well-nourished.   HENT:   Head: Normocephalic and atraumatic.   Eyes: Pupils are equal, round, and reactive to light. Conjunctivae are normal.   Neck: Neck supple. JVD present. No tracheal deviation present. No thyromegaly present.   Cardiovascular: Normal rate. A regularly irregular rhythm present. PMI is displaced.   Murmur heard.   Systolic murmur is present with a grade of 2/6.  Pulmonary/Chest: Effort normal. He has decreased breath sounds. He has wheezes. He has rales in the right lower field and the left lower field.   Abdominal: Soft.   Musculoskeletal: Normal range of motion. He exhibits edema.   Lymphadenopathy:     He has no cervical adenopathy.   Neurological: He is alert and oriented to person, place, and time.   Skin: Skin is warm and dry.   Nursing note and vitals reviewed.      Vents:  Oxygen Concentration (%): 24 (05/22/20 2352)    Lines/Drains/Airways     Peripheral Intravenous Line                 Peripheral IV - Single Lumen 05/20/20 0810 20 G Left Antecubital 3 days         Peripheral IV - Single Lumen 05/20/20 2240 20 G Left Hand 2 days                Significant Labs:    CBC/Anemia Profile:  Recent Labs   Lab 05/22/20  0707 05/23/20  0524   WBC 13.65* 11.01   HGB 15.1 14.8   HCT 45.2 44.9    179   MCV 95 95   RDW 17.8* 17.6*        Chemistries:  Recent Labs   Lab 05/22/20  0707 05/23/20  0524     141 140  140   K 3.7  3.7 3.5  3.5     102 102  102   CO2 26  27 25  25   BUN 40*  36* 45*  44*   CREATININE 1.9*  1.8* 2.1*  2.0*   CALCIUM 9.5  9.7 9.0  9.2   ALBUMIN 3.5  3.5 3.4*  3.3*   PROT 7.6 7.3   BILITOT 0.5 0.3   ALKPHOS 135 136*   ALT 13 12   AST 29 18   PHOS 3.1 4.1       BMP:   Recent Labs   Lab 05/23/20  0524     103     140   K 3.5  3.5     102   CO2 25  25   BUN 45*  44*   CREATININE 2.1*  2.0*   CALCIUM 9.0  9.2     CMP:   Recent Labs   Lab 05/22/20  0707  05/23/20  0524     141 140  140   K 3.7  3.7 3.5  3.5     102 102  102   CO2 26  27 25  25     105 103  103   BUN 40*  36* 45*  44*   CREATININE 1.9*  1.8* 2.1*  2.0*   CALCIUM 9.5  9.7 9.0  9.2   PROT 7.6 7.3   ALBUMIN 3.5  3.5 3.4*  3.3*   BILITOT 0.5 0.3   ALKPHOS 135 136*   AST 29 18   ALT 13 12   ANIONGAP 14  12 13  13   EGFRNONAA 37*  40* 33*  35*     All pertinent labs within the past 24 hours have been reviewed.    Significant Imaging:   I have reviewed all pertinent imaging results/findings within the past 24 hours.  I have reviewed and interpreted all pertinent imaging results/findings within the past 24 hours.     US Lower Extrem Arteries Bilat with CRICKET (xpd)  Narrative: EXAMINATION:  US ARTERIAL LOWER EXTREMITY BILAT WITH CRICKET (XPD)    CLINICAL HISTORY:  Peripheral arterial disease;    FINDINGS:  No flow is detected in the left peroneal and posterior tibial arteries consistent with occlusion.  There are monophasic waveforms of the bilateral dorsalis pedis arteries and right posterior tibial artery with peak systolic velocities of 24 centimeters/second for the right DPA, 15.6 centimeters/second for the right PTA and 21.7 centimeters/second for the left DPA.  There are multiphase waveforms and normal velocities of the remaining bilateral lower extremity arteries.  No elevated peak systolic velocities identified.  Impression: Occlusion of the left peroneal and posterior tibial arteries.  Monophasic waveforms and mildly decreased flow of the right PTA and bilateral DPA.    Electronically signed by: Jose Luis Huerta MD  Date:    05/23/2020  Time:    15:10

## 2020-05-23 NOTE — SUBJECTIVE & OBJECTIVE
Review of Systems   Constitution: Negative.   HENT: Negative.    Eyes: Negative.    Cardiovascular: Positive for dyspnea on exertion.   Respiratory: Positive for shortness of breath.    Endocrine: Negative.    Hematologic/Lymphatic: Negative.    Skin: Negative.    Musculoskeletal: Negative.    Gastrointestinal: Negative.    Genitourinary: Negative.    Neurological: Negative.    Psychiatric/Behavioral: Negative.    Allergic/Immunologic: Negative.      Objective:     Vital Signs (Most Recent):  Temp: 97.7 °F (36.5 °C) (05/23/20 0722)  Pulse: 88 (05/23/20 0818)  Resp: 16 (05/23/20 0818)  BP: 121/81 (05/23/20 0722)  SpO2: 95 % (05/23/20 0818) Vital Signs (24h Range):  Temp:  [96.9 °F (36.1 °C)-98.3 °F (36.8 °C)] 97.7 °F (36.5 °C)  Pulse:  [] 88  Resp:  [16-20] 16  SpO2:  [93 %-97 %] 95 %  BP: (101-122)/(64-88) 121/81     Weight: 88.2 kg (194 lb 7.1 oz)  Body mass index is 28.71 kg/m².     SpO2: 95 %  O2 Device (Oxygen Therapy): room air      Intake/Output Summary (Last 24 hours) at 5/23/2020 1110  Last data filed at 5/23/2020 0402  Gross per 24 hour   Intake 269 ml   Output 1025 ml   Net -756 ml       Lines/Drains/Airways     Peripheral Intravenous Line                 Peripheral IV - Single Lumen 05/20/20 0810 20 G Left Antecubital 3 days         Peripheral IV - Single Lumen 05/20/20 2240 20 G Left Hand 2 days                Physical Exam   Constitutional: He is oriented to person, place, and time. He appears well-developed and well-nourished.   HENT:   Head: Normocephalic.   Neck: Normal range of motion. Neck supple. Normal carotid pulses, no hepatojugular reflux and no JVD present. Carotid bruit is not present. No thyromegaly present.   Cardiovascular: Normal rate, regular rhythm, S1 normal and S2 normal. PMI is not displaced. Exam reveals no S3, no S4, no distant heart sounds, no friction rub, no midsystolic click and no opening snap.   No murmur heard.  Pulses:       Radial pulses are 2+ on the right  side, and 2+ on the left side.        Femoral pulses are 1+ on the right side, and 1+ on the left side.       Dorsalis pedis pulses are 2+ on the right side, and 2+ on the left side.        Posterior tibial pulses are 0 on the right side, and 0 on the left side.   Pulmonary/Chest: Effort normal and breath sounds normal. He has no wheezes. He has no rales.   Abdominal: Soft. Bowel sounds are normal. He exhibits no distension, no abdominal bruit, no ascites and no mass. There is no tenderness.   Musculoskeletal: He exhibits no edema.   Neurological: He is alert and oriented to person, place, and time.   Skin: Skin is warm.   Psychiatric: He has a normal mood and affect. His behavior is normal.   Nursing note and vitals reviewed.      Significant Labs:   BMP:   Recent Labs   Lab 05/22/20  0707 05/23/20 0524     105 103  103     141 140  140   K 3.7  3.7 3.5  3.5     102 102  102   CO2 26  27 25  25   BUN 40*  36* 45*  44*   CREATININE 1.9*  1.8* 2.1*  2.0*   CALCIUM 9.5  9.7 9.0  9.2   , CMP   Recent Labs   Lab 05/22/20  0707 05/23/20 0524     141 140  140   K 3.7  3.7 3.5  3.5     102 102  102   CO2 26  27 25  25     105 103  103   BUN 40*  36* 45*  44*   CREATININE 1.9*  1.8* 2.1*  2.0*   CALCIUM 9.5  9.7 9.0  9.2   PROT 7.6 7.3   ALBUMIN 3.5  3.5 3.4*  3.3*   BILITOT 0.5 0.3   ALKPHOS 135 136*   AST 29 18   ALT 13 12   ANIONGAP 14  12 13  13   ESTGFRAFRICA 43*  46* 38*  40*   EGFRNONAA 37*  40* 33*  35*   , CBC   Recent Labs   Lab 05/22/20  0707 05/23/20 0524   WBC 13.65* 11.01   HGB 15.1 14.8   HCT 45.2 44.9    179   , INR No results for input(s): INR, PROTIME in the last 48 hours. and Troponin   Recent Labs   Lab 05/22/20  0707 05/23/20 0524   TROPONINI 6.891* 5.876*       Significant Imaging: Echocardiogram:   Transthoracic echo (TTE) complete (Cupid Only):   Results for orders placed or performed during the hospital  encounter of 05/20/20   Echo Color Flow Doppler? Yes   Result Value Ref Range    Ascending aorta 2.81 cm    STJ 3.00 cm    AV mean gradient 2 mmHg    Ao peak papi 0.90 m/s    Ao VTI 14.90 cm    IVRT 51.38 msec    IVS 1.64 (A) 0.6 - 1.1 cm    LA size 3.46 cm    Left Atrium Major Axis 3.42 cm    Left Atrium Minor Axis 2.79 cm    LVIDD 4.49 3.5 - 6.0 cm    LVIDS 3.85 2.1 - 4.0 cm    LVOT diameter 2.06 cm    LVOT peak VTI 10.76 cm    PW 1.74 (A) 0.6 - 1.1 cm    MV Peak A Papi 0.26 m/s    E wave decelartion time 146.42 msec    MV Peak E Papi 0.87 m/s    RA Major Axis 3.05 cm    Sinus 3.29 cm    TAPSE 1.39 cm    TR Max Papi 3.01 m/s    TDI LATERAL 0.07 m/s    TDI SEPTAL 0.04 m/s    LA WIDTH 3.73 cm    Ao root annulus 3.60 cm    LV Diastolic Volume 92.19 mL    LV Systolic Volume 63.94 mL    LVOT peak papi 0.60 m/s    LV LATERAL E/E' RATIO 12.43 m/s    LV SEPTAL E/E' RATIO 21.75 m/s    FS 14 %    LA volume 33.71 cm3    LV mass 330.84 g    Left Ventricle Relative Wall Thickness 0.78 cm    AV valve area 2.41 cm2    AV Velocity Ratio 0.67     AV index (prosthetic) 0.72     E/A ratio 3.35     Mean e' 0.06 m/s    LVOT area 3.3 cm2    LVOT stroke volume 35.84 cm3    AV peak gradient 3 mmHg    E/E' ratio 15.82 m/s    LV Systolic Volume Index 31.2 mL/m2    LV Diastolic Volume Index 44.92 mL/m2    LA Volume Index 16.4 mL/m2    LV Mass Index 161 g/m2    Triscuspid Valve Regurgitation Peak Gradient 36 mmHg    BSA 2.09 m2    Narrative    · Severely decreased left ventricular systolic function. The estimated   ejection fraction is 20%.  · Grade III (severe) left ventricular diastolic dysfunction consistent   with restrictive physiology.  · Normal right ventricular systolic function.  · Concentric left ventricular hypertrophy.

## 2020-05-23 NOTE — ASSESSMENT & PLAN NOTE
05/23/2020  ASSESSMENT: Chronic condition. Maintaining SpO2 on room air.   PLAN:  Continue present treatment plan.

## 2020-05-23 NOTE — PROGRESS NOTES
[facility] -   Cardiology  Progress Note    Patient Name: Ceasar Hernandez  MRN: [id number]  Admission Date: 5/20/2020  Hospital Length of Stay: 3 days  Code Status: Full Code   Attending Physician: Domingo De Paz MD   Primary Care Physician: Provider Notinsystem  Expected Discharge Date:   Principal Problem:Acute combined systolic and diastolic CHF, NYHA class 4    Subjective:     HPI:  Pt presents with acute respiratory failure.  Per ER chart, suspicious for Covid 19.  Cardiology consult performed based on communication from ER physician and chart review.  He has h/o COPD.  Pt presented to ER with c/o dyspnea since yesterday with associated wheezing.  Per chart no cp sxs.  ECG in ER showed possible SVT.  Adenosine administered and f/u ecgs looked more like sinus tachycardia.  BP very high on arrival 217/109.  Given Labetalol in ER.    BNP 1194  Troponin 0.09  Lactic acid +  ecg with inferolateral st-t abnl.  No old records to compare/review.    Hospital Course:   5/21/20:  PT SEEN AND EXAMINED IN ICU.  HE FELT REMARKABLY IMPROVED, ON NASAL CANNULA.  DENIES CHEST PAIN SXS.  DYSPNEA MUCH IMPROVED.  TROPONIN PEAK NEAR 2.6  BP HAS BEEN VERY HIGH. LACTIC ACID NORMAL NOW.  CREATININE STABLE.    5/22/2020-Patient seen and examined today, resting in bed. Feeling much better, SOB greatly improved. Some mild chest pressure, overnight, has since resolved. Labs reviewed. Troponin> 6 this AM, will repeat later today. Creatinine 1.9. BNP improved but still elevated.     5/23/20:  PT SEEN AND EXAMINED THIS AM.  NO CP SXS.  STILL WITH SOME DYSPNEA BUT MUCH IMPROVED.  LABS STABLE OVERALL. CREATININE BUMPED UP SLIGHTLY.  TROPONIN TRENDING DOWN.  BP MUCH IMPROVED.  ALSO DRINKS 1 PINT ETOH EVERY FEW DAYS.         Review of Systems   Constitution: Negative.   HENT: Negative.    Eyes: Negative.    Cardiovascular: Positive for dyspnea on exertion.   Respiratory: Positive for shortness of breath.    Endocrine: Negative.     Hematologic/Lymphatic: Negative.    Skin: Negative.    Musculoskeletal: Negative.    Gastrointestinal: Negative.    Genitourinary: Negative.    Neurological: Negative.    Psychiatric/Behavioral: Negative.    Allergic/Immunologic: Negative.      Objective:     Vital Signs (Most Recent):  Temp: 97.7 °F (36.5 °C) (05/23/20 0722)  Pulse: 88 (05/23/20 0818)  Resp: 16 (05/23/20 0818)  BP: 121/81 (05/23/20 0722)  SpO2: 95 % (05/23/20 0818) Vital Signs (24h Range):  Temp:  [96.9 °F (36.1 °C)-98.3 °F (36.8 °C)] 97.7 °F (36.5 °C)  Pulse:  [] 88  Resp:  [16-20] 16  SpO2:  [93 %-97 %] 95 %  BP: (101-122)/(64-88) 121/81     Weight: 88.2 kg (194 lb 7.1 oz)  Body mass index is 28.71 kg/m².     SpO2: 95 %  O2 Device (Oxygen Therapy): room air      Intake/Output Summary (Last 24 hours) at 5/23/2020 1110  Last data filed at 5/23/2020 0402  Gross per 24 hour   Intake 269 ml   Output 1025 ml   Net -756 ml       Lines/Drains/Airways     Peripheral Intravenous Line                 Peripheral IV - Single Lumen 05/20/20 0810 20 G Left Antecubital 3 days         Peripheral IV - Single Lumen 05/20/20 2240 20 G Left Hand 2 days                Physical Exam   Constitutional: He is oriented to person, place, and time. He appears well-developed and well-nourished.   HENT:   Head: Normocephalic.   Neck: Normal range of motion. Neck supple. Normal carotid pulses, no hepatojugular reflux and no JVD present. Carotid bruit is not present. No thyromegaly present.   Cardiovascular: Normal rate, regular rhythm, S1 normal and S2 normal. PMI is not displaced. Exam reveals no S3, no S4, no distant heart sounds, no friction rub, no midsystolic click and no opening snap.   No murmur heard.  Pulses:       Radial pulses are 2+ on the right side, and 2+ on the left side.        Femoral pulses are 1+ on the right side, and 1+ on the left side.       Dorsalis pedis pulses are 2+ on the right side, and 2+ on the left side.        Posterior tibial pulses  are 0 on the right side, and 0 on the left side.   Pulmonary/Chest: Effort normal and breath sounds normal. He has no wheezes. He has no rales.   Abdominal: Soft. Bowel sounds are normal. He exhibits no distension, no abdominal bruit, no ascites and no mass. There is no tenderness.   Musculoskeletal: He exhibits no edema.   Neurological: He is alert and oriented to person, place, and time.   Skin: Skin is warm.   Psychiatric: He has a normal mood and affect. His behavior is normal.   Nursing note and vitals reviewed.      Significant Labs:   BMP:   Recent Labs   Lab 05/22/20  0707 05/23/20 0524     105 103  103     141 140  140   K 3.7  3.7 3.5  3.5     102 102  102   CO2 26  27 25  25   BUN 40*  36* 45*  44*   CREATININE 1.9*  1.8* 2.1*  2.0*   CALCIUM 9.5  9.7 9.0  9.2   , CMP   Recent Labs   Lab 05/22/20  0707 05/23/20 0524     141 140  140   K 3.7  3.7 3.5  3.5     102 102  102   CO2 26  27 25  25     105 103  103   BUN 40*  36* 45*  44*   CREATININE 1.9*  1.8* 2.1*  2.0*   CALCIUM 9.5  9.7 9.0  9.2   PROT 7.6 7.3   ALBUMIN 3.5  3.5 3.4*  3.3*   BILITOT 0.5 0.3   ALKPHOS 135 136*   AST 29 18   ALT 13 12   ANIONGAP 14  12 13  13   ESTGFRAFRICA 43*  46* 38*  40*   EGFRNONAA 37*  40* 33*  35*   , CBC   Recent Labs   Lab 05/22/20  0707 05/23/20 0524   WBC 13.65* 11.01   HGB 15.1 14.8   HCT 45.2 44.9    179   , INR No results for input(s): INR, PROTIME in the last 48 hours. and Troponin   Recent Labs   Lab 05/22/20  0707 05/23/20 0524   TROPONINI 6.891* 5.876*       Significant Imaging: Echocardiogram:   Transthoracic echo (TTE) complete (Cupid Only):   Results for orders placed or performed during the hospital encounter of 05/20/20   Echo Color Flow Doppler? Yes   Result Value Ref Range    Ascending aorta 2.81 cm    STJ 3.00 cm    AV mean gradient 2 mmHg    Ao peak neeru 0.90 m/s    Ao VTI 14.90 cm    IVRT 51.38 msec    IVS 1.64  (A) 0.6 - 1.1 cm    LA size 3.46 cm    Left Atrium Major Axis 3.42 cm    Left Atrium Minor Axis 2.79 cm    LVIDD 4.49 3.5 - 6.0 cm    LVIDS 3.85 2.1 - 4.0 cm    LVOT diameter 2.06 cm    LVOT peak VTI 10.76 cm    PW 1.74 (A) 0.6 - 1.1 cm    MV Peak A Papi 0.26 m/s    E wave decelartion time 146.42 msec    MV Peak E Papi 0.87 m/s    RA Major Axis 3.05 cm    Sinus 3.29 cm    TAPSE 1.39 cm    TR Max Papi 3.01 m/s    TDI LATERAL 0.07 m/s    TDI SEPTAL 0.04 m/s    LA WIDTH 3.73 cm    Ao root annulus 3.60 cm    LV Diastolic Volume 92.19 mL    LV Systolic Volume 63.94 mL    LVOT peak papi 0.60 m/s    LV LATERAL E/E' RATIO 12.43 m/s    LV SEPTAL E/E' RATIO 21.75 m/s    FS 14 %    LA volume 33.71 cm3    LV mass 330.84 g    Left Ventricle Relative Wall Thickness 0.78 cm    AV valve area 2.41 cm2    AV Velocity Ratio 0.67     AV index (prosthetic) 0.72     E/A ratio 3.35     Mean e' 0.06 m/s    LVOT area 3.3 cm2    LVOT stroke volume 35.84 cm3    AV peak gradient 3 mmHg    E/E' ratio 15.82 m/s    LV Systolic Volume Index 31.2 mL/m2    LV Diastolic Volume Index 44.92 mL/m2    LA Volume Index 16.4 mL/m2    LV Mass Index 161 g/m2    Triscuspid Valve Regurgitation Peak Gradient 36 mmHg    BSA 2.09 m2    Narrative    · Severely decreased left ventricular systolic function. The estimated   ejection fraction is 20%.  · Grade III (severe) left ventricular diastolic dysfunction consistent   with restrictive physiology.  · Normal right ventricular systolic function.  · Concentric left ventricular hypertrophy.        Assessment and Plan:     ACUTE CHF IMPROVING.  NSTEMI STABILIZING.  OFF HTN MEDS FOR MONTHS -- BP MARKEDLY ELEVATED ON ADMIT NOW MUCH IMPROVED.  COUNSELED ON ETOH CESSATION.  WOULD BENEFIT FROM LHC ONCE CV AND RENAL STATUS OPTIMAL.  CHANGE IV LASIX TO PO DOSING TODAY.  RECHECK LABS IN AM TO ASSESS RENAL FUNCTION.  CONTINUE OTHER MEDS.  DISCUSSED RISKS/BENEFITS OF LHC TO INCLUDE PCI AND PT INFORMED HE WOULD BE AT INCREASED RISK  FOR CATH COMPLICATIONS WITH SEVERE CHF AND RENAL FAILURE.  ANY INTERVENTION MIGHT REQUIRE IABP OR IMPELLA SUPPORT.  CHECK B LE ARTERIAL VASCULAR STUDIES.  WILL F/U TOMORROW AND REVIEW LABS/STUDIES.  TIMING OF CATH TO BE DETERMINED.  CONTINUE HEPARIN GTT FOR NOW.      * Acute combined systolic and diastolic CHF, NYHA class 4  -Presents with acute decompensated combined CHF  -EF 20%  -Continue IV diuresis for additional day  -Continue BB, ACEi  -Add Imdur  -Strict I's/O's  -Ischemic evaluation prior to d/c    ETOH abuse  Pt counseled on cessation of alcohol.    PAD (peripheral artery disease)   See management plan detailed above.     Smoking history  -Smoking cessation      Lactic acidosis  -Normalized since admission    COPD (chronic obstructive pulmonary disease)  -Per hospital medicine, critical care mgt.    Essential hypertension  -BP improved  -Continue Coreg, ACEi  -Imdur 30 mg daily added  -Monitor    NSTEMI (non-ST elevated myocardial infarction)  -Troponin > 6 this AM, repeat this afternoon  -Continue ASA, Plavix, BB, statin, ACEi  -Add Imdur 30 mg daily  -Continue heparin gtt for additional day  -EF 20%  -Will need ischemic evaluation with probable LHC pending clinical course    Abnormal ECG  -Ischemic evaluation with LHC vs MPI stress test pending clinical course        VTE Risk Mitigation (From admission, onward)         Ordered     heparin 25,000 units in dextrose 5% 250 mL (100 units/mL) infusion LOW INTENSITY nomogram - [facility]  Continuous     Question:  Heparin Infusion Adjustment (DO NOT MODIFY ANSWER)  Answer:  [email or web address]\epic\Images\Pharmacy\HeparinInfusions\heparin LOW INTENSITY nomogram for [facility] [id number]    05/20/20 2101     heparin 25,000 units in dextrose 5% (100 units/ml) IV bolus from bag - ADDITIONAL PRN BOLUS - 60 units/kg (max bolus 4000 units)  As needed (PRN)     Question:  Heparin Infusion Adjustment (DO NOT MODIFY ANSWER)  Answer:   \\[email or web address]\epic\Images\Pharmacy\HeparinInfusions\heparin LOW INTENSITY nomogram for [facility] [id number]    05/20/20 2103     heparin 25,000 units in dextrose 5% (100 units/ml) IV bolus from bag - ADDITIONAL PRN BOLUS - 30 units/kg (max bolus 4000 units)  As needed (PRN)     Question:  Heparin Infusion Adjustment (DO NOT MODIFY ANSWER)  Answer:  \\[email or web address]\epic\Images\Pharmacy\HeparinInfusions\heparin LOW INTENSITY nomogram for [facility] [id number]    05/20/20 2103     IP VTE HIGH RISK PATIENT  Once      05/20/20 2058     Place sequential compression device  Until discontinued      05/20/20 2058                Rom Hall MD  Cardiology  [facility] -

## 2020-05-23 NOTE — ASSESSMENT & PLAN NOTE
05/23/2020  ASSESSMENT: He is currently at the action (quitting) stage of smoking cessation. Smoking cessation encouraged.    PLAN:  Continue nicotine patch. Referral to tobacco cessation program after discharge.

## 2020-05-23 NOTE — SUBJECTIVE & OBJECTIVE
Interval History: No events overnight. Feels better in terms of SOB. Denies nay chest pain     Review of Systems  Objective:     Vital Signs (Most Recent):  Temp: 97 °F (36.1 °C) (05/23/20 1211)  Pulse: 96 (05/23/20 1211)  Resp: 18 (05/23/20 1211)  BP: 132/75 (05/23/20 1211)  SpO2: 95 % (05/23/20 1211) Vital Signs (24h Range):  Temp:  [96.9 °F (36.1 °C)-98.3 °F (36.8 °C)] 97 °F (36.1 °C)  Pulse:  [] 96  Resp:  [16-20] 18  SpO2:  [93 %-97 %] 95 %  BP: (101-132)/(64-81) 132/75     Weight: 88.2 kg (194 lb 7.1 oz)  Body mass index is 28.71 kg/m².    Intake/Output Summary (Last 24 hours) at 5/23/2020 1238  Last data filed at 5/23/2020 0402  Gross per 24 hour   Intake 269 ml   Output 1025 ml   Net -756 ml      Physical Exam  Constitutional: He is oriented to person, place, and time. He appears well-nourished.   HENT:   Mouth/Throat:  Eyes: EOM are normal.   Cardiovascular: Normal rate and regular rhythm.   Pulmonary/Chest: Effort normal. No stridor. No respiratory distress. He has mild wheezes.   Good air movement   Abdominal: Soft. There is no tenderness.   Neurological: He is alert and oriented to person, place, and time. No cranial nerve deficit.   Skin: Skin is warm and dry.   Psychiatric: He has a normal mood and affect. His behavior is normal. Judgment normal.   Significant Labs:   CBC:   Recent Labs   Lab 05/22/20  0707 05/23/20 0524   WBC 13.65* 11.01   HGB 15.1 14.8   HCT 45.2 44.9    179     CMP:   Recent Labs   Lab 05/22/20  0707 05/23/20 0524     141 140  140   K 3.7  3.7 3.5  3.5     102 102  102   CO2 26  27 25  25     105 103  103   BUN 40*  36* 45*  44*   CREATININE 1.9*  1.8* 2.1*  2.0*   CALCIUM 9.5  9.7 9.0  9.2   PROT 7.6 7.3   ALBUMIN 3.5  3.5 3.4*  3.3*   BILITOT 0.5 0.3   ALKPHOS 135 136*   AST 29 18   ALT 13 12   ANIONGAP 14  12 13  13   EGFRNONAA 37*  40* 33*  35*       Significant Imaging: I have reviewed and interpreted all pertinent  imaging results/findings within the past 24 hours.

## 2020-05-23 NOTE — PROGRESS NOTES
[facility]  Pulmonology  Progress Note    Patient Name: Ceasar Hernandez  MRN: [id number]  Admission Date: 5/20/2020  Hospital Length of Stay: 3 days  Code Status: Full Code  Attending Provider: Domingo De Paz MD  Primary Care Provider: Provider [facility]   Principal Problem: Acute combined systolic and diastolic CHF, NYHA class 4    Subjective: no new c/o     Past Medical History:   Diagnosis Date    COPD (chronic obstructive pulmonary disease)     Gout     Hypertension     NSTEMI (non-ST elevated myocardial infarction) 5/20/2020       Past Surgical History:   Procedure Laterality Date    FRACTURE SURGERY         Review of patient's allergies indicates:  No Known Allergies    Family History     None        Tobacco Use    Smoking status: Current Every Day Smoker     Packs/day: 1.00     Years: 40.00     Pack years: 40.00     Types: Cigarettes     Start date: 1/1/1979    Smokeless tobacco: Never Used   Substance and Sexual Activity    Alcohol use: Yes     Comment:  PINT/DAILY    Drug use: Not Currently    Sexual activity: Not Currently     Partners: Female         Review of Systems   Constitutional: Positive for activity change and diaphoresis.   HENT: Positive for postnasal drip and rhinorrhea.    Respiratory: Positive for cough, shortness of breath and wheezing.    Cardiovascular: Positive for chest pain, palpitations and leg swelling.        Paroxysmal nocturnal dyspnea     Gastrointestinal: Negative.    Endocrine: Negative.    Genitourinary: Negative.    Musculoskeletal: Positive for back pain.   Skin: Negative.    Allergic/Immunologic: Negative.    Neurological: Positive for weakness.   Hematological: Negative.      Objective:     Vital Signs (Most Recent):  Temp: 98.6 °F (37 °C) (05/23/20 1534)  Pulse: 94 (05/23/20 1534)  Resp: 18 (05/23/20 1534)  BP: 121/78 (05/23/20 1534)  SpO2: 95 % (05/23/20 1534) Vital Signs (24h Range):  Temp:  [96.9 °F (36.1 °C)-98.6 °F (37 °C)] 98.6 °F (37  °C)  Pulse:  [] 94  Resp:  [16-20] 18  SpO2:  [93 %-97 %] 95 %  BP: (101-132)/(64-81) 121/78     Weight: 88.2 kg (194 lb 7.1 oz)  Body mass index is 28.71 kg/m².      Intake/Output Summary (Last 24 hours) at 5/23/2020 1842  Last data filed at 5/23/2020 1700  Gross per 24 hour   Intake 840.5 ml   Output 1125 ml   Net -284.5 ml       Physical Exam   Constitutional: He is oriented to person, place, and time. He appears well-developed and well-nourished.   HENT:   Head: Normocephalic and atraumatic.   Eyes: Pupils are equal, round, and reactive to light. Conjunctivae are normal.   Neck: Neck supple. JVD present. No tracheal deviation present. No thyromegaly present.   Cardiovascular: Normal rate. A regularly irregular rhythm present. PMI is displaced.   Murmur heard.   Systolic murmur is present with a grade of 2/6.  Pulmonary/Chest: Effort normal. He has decreased breath sounds. He has wheezes. He has rales in the right lower field and the left lower field.   Abdominal: Soft.   Musculoskeletal: Normal range of motion. He exhibits edema.   Lymphadenopathy:     He has no cervical adenopathy.   Neurological: He is alert and oriented to person, place, and time.   Skin: Skin is warm and dry.   Nursing note and vitals reviewed.      Vents:  Oxygen Concentration (%): 24 (05/22/20 2352)    Lines/Drains/Airways     Peripheral Intravenous Line                 Peripheral IV - Single Lumen 05/20/20 0810 20 G Left Antecubital 3 days         Peripheral IV - Single Lumen 05/20/20 2240 20 G Left Hand 2 days                Significant Labs:    CBC/Anemia Profile:  Recent Labs   Lab 05/22/20  0707 05/23/20  0524   WBC 13.65* 11.01   HGB 15.1 14.8   HCT 45.2 44.9    179   MCV 95 95   RDW 17.8* 17.6*        Chemistries:  Recent Labs   Lab 05/22/20  0707 05/23/20  0524     141 140  140   K 3.7  3.7 3.5  3.5     102 102  102   CO2 26  27 25  25   BUN 40*  36* 45*  44*   CREATININE 1.9*  1.8* 2.1*  2.0*    CALCIUM 9.5  9.7 9.0  9.2   ALBUMIN 3.5  3.5 3.4*  3.3*   PROT 7.6 7.3   BILITOT 0.5 0.3   ALKPHOS 135 136*   ALT 13 12   AST 29 18   PHOS 3.1 4.1       BMP:   Recent Labs   Lab 05/23/20  0524     103     140   K 3.5  3.5     102   CO2 25  25   BUN 45*  44*   CREATININE 2.1*  2.0*   CALCIUM 9.0  9.2     CMP:   Recent Labs   Lab 05/22/20  0707 05/23/20  0524     141 140  140   K 3.7  3.7 3.5  3.5     102 102  102   CO2 26  27 25  25     105 103  103   BUN 40*  36* 45*  44*   CREATININE 1.9*  1.8* 2.1*  2.0*   CALCIUM 9.5  9.7 9.0  9.2   PROT 7.6 7.3   ALBUMIN 3.5  3.5 3.4*  3.3*   BILITOT 0.5 0.3   ALKPHOS 135 136*   AST 29 18   ALT 13 12   ANIONGAP 14  12 13  13   EGFRNONAA 37*  40* 33*  35*     All pertinent labs within the past 24 hours have been reviewed.    Significant Imaging:   I have reviewed all pertinent imaging results/findings within the past 24 hours.  I have reviewed and interpreted all pertinent imaging results/findings within the past 24 hours.     US Lower Extrem Arteries Bilat with CRICKET (xpd)  Narrative: EXAMINATION:  US ARTERIAL LOWER EXTREMITY BILAT WITH CRICKET (XPD)    CLINICAL HISTORY:  Peripheral arterial disease;    FINDINGS:  No flow is detected in the left peroneal and posterior tibial arteries consistent with occlusion.  There are monophasic waveforms of the bilateral dorsalis pedis arteries and right posterior tibial artery with peak systolic velocities of 24 centimeters/second for the right DPA, 15.6 centimeters/second for the right PTA and 21.7 centimeters/second for the left DPA.  There are multiphase waveforms and normal velocities of the remaining bilateral lower extremity arteries.  No elevated peak systolic velocities identified.  Impression: Occlusion of the left peroneal and posterior tibial arteries.  Monophasic waveforms and mildly decreased flow of the right PTA and bilateral DPA.    Electronically signed  by: Jose Luis Huerta MD  Date:    05/23/2020  Time:    15:10          ABG  Recent Labs   Lab 05/20/20  0827   PH 7.185*   PO2 82   PCO2 46.7*   HCO3 17.6*   BE -11     Assessment/Plan:     Smoking history  Smoking cessation counseling.   NRT with Nicotine Transdermal Patch.    COPD (chronic obstructive pulmonary disease)  Does not appear to be in exacerbation.    Supplement oxygenation. Keep SAO2 >= 92%.                       Continue with LABA, LUCERO as needed        Continue nighttime BiPAP 10 / 5 and as needed.                                         5/23 Continue above           Iglesia Montenegro MD  Pulmonology  [facility]

## 2020-05-23 NOTE — PLAN OF CARE
POC reviewed with pt. Pt verbalizes understanding of POC. No questions at this time.  AAOx3. NADN.  NSR on cardiac monitor.  Heparin infusing at 19 units/kg/hr.  US of BLE done today.  Pt remains free of falls tonight.  No complaints at this time.  Safety measures in place. Will continue to monitor.  Informed pt to call for assistance before getting up. Pt verbalizes understanding.

## 2020-05-23 NOTE — PROGRESS NOTES
[facility] Hospital Medicine  Progress Note    Patient Name: Ceasar Hernandez  MRN: [id number]  Patient Class: IP- Inpatient   Admission Date: 5/20/2020  Length of Stay: 3 days  Attending Physician: Domingo De Paz MD  Primary Care Provider: Provider [facility]        Subjective:     Principal Problem:Acute combined systolic and diastolic CHF, NYHA class 4        HPI:  Pt is a 61 yo male with PMhx of COPD and HTN who was admitted to ICU in early AM of 5/21/2020 for Acute Respiratory Failure requiring BIPAP and decompensated combined heart failure. Pt reported WINSTON and lightheadedness. COVID 19 negative. Troponins were elevated 1.180 > 2.686 > 2.573 and Cardiology recommended heparin infusion. Pt was in acute CHF exacerbation and 2 d ECHO showed LVEF 20 % and diastolic dysfunction. Diuresis with IV lasix given. Initially, pt required supplemental oxygen via NIPPV which has thus been weaned. Also, pt received Plavix, BB, ACE inhibitor, ASA and STATIN. Pt's respiratory status improved and he was downgraded to Telemetry unit.     Overview/Hospital Course:  05/22/2020 Admitted 05/20/2020 to ICU for NSTEMI and Acute Combined Systolic and Diastolic heart failure. ECHO showed EF 20% and severe diastolic dysfunction. He stabilized and was transferred out of ICU to telemetry. Cardiology following. CV Meds include carvedilol, lisinopril, furosemide, isosorbide mononitrate, atorvastatin, and clopidogrel, aspirin. Symptoms MUCH improved. No present chest pain. Breathing much more comfortably. Good diuresis. Anticipating cardiac catheterization when more stable, probably Monday.    Interval History: No events overnight. Feels better in terms of SOB. Denies nay chest pain     Review of Systems  Objective:     Vital Signs (Most Recent):  Temp: 97 °F (36.1 °C) (05/23/20 1211)  Pulse: 96 (05/23/20 1211)  Resp: 18 (05/23/20 1211)  BP: 132/75 (05/23/20 1211)  SpO2: 95 % (05/23/20 1211) Vital Signs (24h Range):  Temp:   [96.9 °F (36.1 °C)-98.3 °F (36.8 °C)] 97 °F (36.1 °C)  Pulse:  [] 96  Resp:  [16-20] 18  SpO2:  [93 %-97 %] 95 %  BP: (101-132)/(64-81) 132/75     Weight: 88.2 kg (194 lb 7.1 oz)  Body mass index is 28.71 kg/m².    Intake/Output Summary (Last 24 hours) at 5/23/2020 1238  Last data filed at 5/23/2020 0402  Gross per 24 hour   Intake 269 ml   Output 1025 ml   Net -756 ml      Physical Exam  Constitutional: He is oriented to person, place, and time. He appears well-nourished.   HENT:   Mouth/Throat:  Eyes: EOM are normal.   Cardiovascular: Normal rate and regular rhythm.   Pulmonary/Chest: Effort normal. No stridor. No respiratory distress. He has mild wheezes.   Good air movement   Abdominal: Soft. There is no tenderness.   Neurological: He is alert and oriented to person, place, and time. No cranial nerve deficit.   Skin: Skin is warm and dry.   Psychiatric: He has a normal mood and affect. His behavior is normal. Judgment normal.    Significant Labs:   CBC:   Recent Labs   Lab 05/22/20  0707 05/23/20  0524   WBC 13.65* 11.01   HGB 15.1 14.8   HCT 45.2 44.9    179     CMP:   Recent Labs   Lab 05/22/20  0707 05/23/20  0524     141 140  140   K 3.7  3.7 3.5  3.5     102 102  102   CO2 26  27 25  25     105 103  103   BUN 40*  36* 45*  44*   CREATININE 1.9*  1.8* 2.1*  2.0*   CALCIUM 9.5  9.7 9.0  9.2   PROT 7.6 7.3   ALBUMIN 3.5  3.5 3.4*  3.3*   BILITOT 0.5 0.3   ALKPHOS 135 136*   AST 29 18   ALT 13 12   ANIONGAP 14  12 13  13   EGFRNONAA 37*  40* 33*  35*       Significant Imaging: I have reviewed and interpreted all pertinent imaging results/findings within the past 24 hours.      Assessment/Plan:      * Acute combined systolic and diastolic CHF, NYHA class 4  Currently Euvolemic. IV lasix changed to PO lasix .     Anxiety  05/22/2020  ASSESSMENT: Anxiety about health, exacerbated by smoking cessation and insomnia. [place] Board of Pharmacy Controlled  Prescription Drug Monitoring database was queried and showed no activity to suggest abuse, diversion, or other inappropriate use of prescription medications.  PLAN:  Alprazolam PRN.        Leukocytosis  05/22/2020  ASSESSMENT: No infectious source. Could be reaction to corticosteroids or stress.   PLAN:  Monitor.    Recent Labs   Lab 05/20/20  0811 05/20/20  2240 05/21/20  0355 05/22/20  0707   WBC 13.83* 13.80* 16.00* 13.65*     Microbiology Results (last 7 days)     Procedure Component Value Units Date/Time    Blood culture x two cultures. Draw prior to antibiotics. [[id number]] Collected:  05/20/20 0814    Order Status:  Completed Specimen:  Blood from Peripheral, Antecubital, Right Updated:  05/22/20 1612     Blood Culture, Routine No Growth to date      No Growth to date      No Growth to date    Narrative:       Aerobic and anaerobic    Blood culture x two cultures. Draw prior to antibiotics. [[id number]] Collected:  05/20/20 0811    Order Status:  Completed Specimen:  Blood from Peripheral, Antecubital, Left Updated:  05/22/20 1612     Blood Culture, Routine No Growth to date      No Growth to date      No Growth to date    Narrative:       Aerobic and anaerobic         Antibiotics (From admission, onward)    None           Respiratory distress, acute  05/23/2020 RESOLVED.    Smoking history  05/23/2020  ASSESSMENT: He is currently at the action (quitting) stage of smoking cessation. Smoking cessation encouraged.    PLAN:  Continue nicotine patch. Referral to tobacco cessation program after discharge.      Lactic acidosis  05/22/2020  ASSESSMENT: RESOLVED.  Recent Labs   Lab 05/20/20  0811 05/20/20  2240 05/21/20  1151   LACTATE 6.3* 2.3* 1.8        COPD (chronic obstructive pulmonary disease)  05/23/2020  ASSESSMENT: Chronic condition. Maintaining SpO2 on room air.   PLAN:  Continue present treatment plan.        Essential hypertension  05/23/2020  ASSESSMENT: Controlled.   PLAN:  Continue present treatment  plan.  Temp:  [96.9 °F (36.1 °C)-98.3 °F (36.8 °C)] 97 °F (36.1 °C)  Pulse:  [] 96  Resp:  [16-20] 18  SpO2:  [93 %-97 %] 95 %  BP: (101-132)/(64-81) 132/75        NSTEMI (non-ST elevated myocardial infarction)  On aspirin, plavix  Coreg,lipitor, heparin drip. Flora score 130. Cards planning [facility] [place]. Hold ACEI due to worsening kidney functions     Abnormal ECG          VTE Risk Mitigation (From admission, onward)         Ordered     heparin 25,000 units in dextrose 5% 250 mL (100 units/mL) infusion LOW INTENSITY nomogram - [facility]  Continuous     Question:  Heparin Infusion Adjustment (DO NOT MODIFY ANSWER)  Answer:  \\[email or web address]\epic\Images\Pharmacy\HeparinInfusions\heparin LOW INTENSITY nomogram for [facility] [id number].pdf    05/20/20 2103     heparin 25,000 units in dextrose 5% (100 units/ml) IV bolus from bag - ADDITIONAL PRN BOLUS - 60 units/kg (max bolus 4000 units)  As needed (PRN)     Question:  Heparin Infusion Adjustment (DO NOT MODIFY ANSWER)  Answer:  \\[email or web address]\epic\Images\Pharmacy\HeparinInfusions\heparin LOW INTENSITY nomogram for [facility] [id number].pdf    05/20/20 2103     heparin 25,000 units in dextrose 5% (100 units/ml) IV bolus from bag - ADDITIONAL PRN BOLUS - 30 units/kg (max bolus 4000 units)  As needed (PRN)     Question:  Heparin Infusion Adjustment (DO NOT MODIFY ANSWER)  Answer:  \\[email or web address]\epic\Images\Pharmacy\HeparinInfusions\heparin LOW INTENSITY nomogram for [facility] [id number].pdf    05/20/20 2103     IP VTE HIGH RISK PATIENT  Once      05/20/20 2058     Place sequential compression device  Until discontinued      05/20/20 2058                      Domingo De Paz MD  Department of Hospital Medicine   [facility]

## 2020-05-23 NOTE — PLAN OF CARE
No significant events throughout shift. Pt npo after midnight for possible stress/ lhc. Pt able to make needs known to staff and denies both. 0 s/s acute distress noted. Heparin gtt infusing at this time. Safety intact. Bed in low position. Call light in reach sr up x2. Will cont to monitor and eval throughout shift.

## 2020-05-23 NOTE — ASSESSMENT & PLAN NOTE
On aspirin, plavix  Coreg,lipitor, heparin drip. Flora score 130. Cards planning C. Hold ACEI due to worsening kidney functions

## 2020-05-23 NOTE — ASSESSMENT & PLAN NOTE
05/23/2020  ASSESSMENT: Controlled.   PLAN:  Continue present treatment plan.  Temp:  [96.9 °F (36.1 °C)-98.3 °F (36.8 °C)] 97 °F (36.1 °C)  Pulse:  [] 96  Resp:  [16-20] 18  SpO2:  [93 %-97 %] 95 %  BP: (101-132)/(64-81) 132/75

## 2020-05-23 NOTE — ASSESSMENT & PLAN NOTE
Does not appear to be in exacerbation.    Supplement oxygenation. Keep SAO2 >= 92%.                       Continue with LABA, LUCERO as needed        Continue nighttime BiPAP 10 / 5 and as needed.                                         5/23 Continue above

## 2020-05-24 PROBLEM — N18.9 CRI (CHRONIC RENAL INSUFFICIENCY): Status: ACTIVE | Noted: 2020-05-24

## 2020-05-24 LAB
ALBUMIN SERPL BCP-MCNC: 3.2 G/DL (ref 3.5–5.2)
ALBUMIN SERPL BCP-MCNC: 3.3 G/DL (ref 3.5–5.2)
ALP SERPL-CCNC: 130 U/L (ref 55–135)
ALT SERPL W/O P-5'-P-CCNC: 16 U/L (ref 10–44)
ANION GAP SERPL CALC-SCNC: 11 MMOL/L (ref 8–16)
ANION GAP SERPL CALC-SCNC: 9 MMOL/L (ref 8–16)
APTT BLDCRRT: 36.6 SEC (ref 21–32)
APTT BLDCRRT: 50.5 SEC (ref 21–32)
APTT BLDCRRT: 51.7 SEC (ref 21–32)
AST SERPL-CCNC: 13 U/L (ref 10–40)
BASOPHILS # BLD AUTO: 0.07 K/UL (ref 0–0.2)
BASOPHILS NFR BLD: 0.6 % (ref 0–1.9)
BILIRUB SERPL-MCNC: 0.4 MG/DL (ref 0.1–1)
BNP SERPL-MCNC: 551 PG/ML (ref 0–99)
BUN SERPL-MCNC: 43 MG/DL (ref 8–23)
BUN SERPL-MCNC: 45 MG/DL (ref 8–23)
CALCIUM SERPL-MCNC: 9.8 MG/DL (ref 8.7–10.5)
CALCIUM SERPL-MCNC: 9.8 MG/DL (ref 8.7–10.5)
CHLORIDE SERPL-SCNC: 104 MMOL/L (ref 95–110)
CHLORIDE SERPL-SCNC: 104 MMOL/L (ref 95–110)
CO2 SERPL-SCNC: 25 MMOL/L (ref 23–29)
CO2 SERPL-SCNC: 26 MMOL/L (ref 23–29)
CREAT SERPL-MCNC: 1.9 MG/DL (ref 0.5–1.4)
CREAT SERPL-MCNC: 1.9 MG/DL (ref 0.5–1.4)
DIFFERENTIAL METHOD: ABNORMAL
EOSINOPHIL # BLD AUTO: 0.1 K/UL (ref 0–0.5)
EOSINOPHIL NFR BLD: 0.6 % (ref 0–8)
ERYTHROCYTE [DISTWIDTH] IN BLOOD BY AUTOMATED COUNT: 17.7 % (ref 11.5–14.5)
EST. GFR  (AFRICAN AMERICAN): 43 ML/MIN/1.73 M^2
EST. GFR  (AFRICAN AMERICAN): 43 ML/MIN/1.73 M^2
EST. GFR  (NON AFRICAN AMERICAN): 37 ML/MIN/1.73 M^2
EST. GFR  (NON AFRICAN AMERICAN): 37 ML/MIN/1.73 M^2
GLUCOSE SERPL-MCNC: 106 MG/DL (ref 70–110)
GLUCOSE SERPL-MCNC: 99 MG/DL (ref 70–110)
HCT VFR BLD AUTO: 45.1 % (ref 40–54)
HGB BLD-MCNC: 14.6 G/DL (ref 14–18)
IMM GRANULOCYTES # BLD AUTO: 0.15 K/UL (ref 0–0.04)
IMM GRANULOCYTES NFR BLD AUTO: 1.4 % (ref 0–0.5)
LYMPHOCYTES # BLD AUTO: 1.6 K/UL (ref 1–4.8)
LYMPHOCYTES NFR BLD: 14.8 % (ref 18–48)
MCH RBC QN AUTO: 31.1 PG (ref 27–31)
MCHC RBC AUTO-ENTMCNC: 32.4 G/DL (ref 32–36)
MCV RBC AUTO: 96 FL (ref 82–98)
MONOCYTES # BLD AUTO: 1.4 K/UL (ref 0.3–1)
MONOCYTES NFR BLD: 12.9 % (ref 4–15)
NEUTROPHILS # BLD AUTO: 7.6 K/UL (ref 1.8–7.7)
NEUTROPHILS NFR BLD: 69.7 % (ref 38–73)
NRBC BLD-RTO: 0 /100 WBC
PHOSPHATE SERPL-MCNC: 4.2 MG/DL (ref 2.7–4.5)
PLATELET # BLD AUTO: 171 K/UL (ref 150–350)
PMV BLD AUTO: 12.8 FL (ref 9.2–12.9)
POTASSIUM SERPL-SCNC: 4.1 MMOL/L (ref 3.5–5.1)
POTASSIUM SERPL-SCNC: 4.2 MMOL/L (ref 3.5–5.1)
PROT SERPL-MCNC: 7.2 G/DL (ref 6–8.4)
RBC # BLD AUTO: 4.7 M/UL (ref 4.6–6.2)
SODIUM SERPL-SCNC: 139 MMOL/L (ref 136–145)
SODIUM SERPL-SCNC: 140 MMOL/L (ref 136–145)
TROPONIN I SERPL DL<=0.01 NG/ML-MCNC: 4.11 NG/ML (ref 0–0.03)
WBC # BLD AUTO: 10.89 K/UL (ref 3.9–12.7)

## 2020-05-24 PROCEDURE — 85025 COMPLETE CBC W/AUTO DIFF WBC: CPT

## 2020-05-24 PROCEDURE — 25000003 PHARM REV CODE 250: Performed by: INTERNAL MEDICINE

## 2020-05-24 PROCEDURE — 99233 PR SUBSEQUENT HOSPITAL CARE,LEVL III: ICD-10-PCS | Mod: ,,, | Performed by: INTERNAL MEDICINE

## 2020-05-24 PROCEDURE — 99231 SBSQ HOSP IP/OBS SF/LOW 25: CPT | Mod: ,,, | Performed by: INTERNAL MEDICINE

## 2020-05-24 PROCEDURE — 99231 PR SUBSEQUENT HOSPITAL CARE,LEVL I: ICD-10-PCS | Mod: ,,, | Performed by: INTERNAL MEDICINE

## 2020-05-24 PROCEDURE — 83880 ASSAY OF NATRIURETIC PEPTIDE: CPT

## 2020-05-24 PROCEDURE — 25000003 PHARM REV CODE 250: Performed by: PHYSICIAN ASSISTANT

## 2020-05-24 PROCEDURE — 80069 RENAL FUNCTION PANEL: CPT

## 2020-05-24 PROCEDURE — 27000221 HC OXYGEN, UP TO 24 HOURS

## 2020-05-24 PROCEDURE — 36415 COLL VENOUS BLD VENIPUNCTURE: CPT

## 2020-05-24 PROCEDURE — 63600175 PHARM REV CODE 636 W HCPCS: Performed by: INTERNAL MEDICINE

## 2020-05-24 PROCEDURE — 94761 N-INVAS EAR/PLS OXIMETRY MLT: CPT

## 2020-05-24 PROCEDURE — S4991 NICOTINE PATCH NONLEGEND: HCPCS | Performed by: INTERNAL MEDICINE

## 2020-05-24 PROCEDURE — 21400001 HC TELEMETRY ROOM

## 2020-05-24 PROCEDURE — 85730 THROMBOPLASTIN TIME PARTIAL: CPT

## 2020-05-24 PROCEDURE — 84484 ASSAY OF TROPONIN QUANT: CPT

## 2020-05-24 PROCEDURE — 99900035 HC TECH TIME PER 15 MIN (STAT)

## 2020-05-24 PROCEDURE — 25000003 PHARM REV CODE 250: Performed by: FAMILY MEDICINE

## 2020-05-24 PROCEDURE — 99233 SBSQ HOSP IP/OBS HIGH 50: CPT | Mod: ,,, | Performed by: INTERNAL MEDICINE

## 2020-05-24 PROCEDURE — 85730 THROMBOPLASTIN TIME PARTIAL: CPT | Mod: 91

## 2020-05-24 PROCEDURE — 80053 COMPREHEN METABOLIC PANEL: CPT

## 2020-05-24 RX ORDER — LISINOPRIL 5 MG/1
5 TABLET ORAL DAILY
Status: DISCONTINUED | OUTPATIENT
Start: 2020-05-25 | End: 2020-05-25

## 2020-05-24 RX ORDER — SODIUM CHLORIDE 9 MG/ML
INJECTION, SOLUTION INTRAVENOUS CONTINUOUS
Status: DISCONTINUED | OUTPATIENT
Start: 2020-05-24 | End: 2020-05-25

## 2020-05-24 RX ADMIN — ACETAMINOPHEN 650 MG: 325 TABLET ORAL at 06:05

## 2020-05-24 RX ADMIN — SODIUM CHLORIDE: 0.9 INJECTION, SOLUTION INTRAVENOUS at 11:05

## 2020-05-24 RX ADMIN — HEPARIN SODIUM 19 UNITS/KG/HR: 10000 INJECTION, SOLUTION INTRAVENOUS at 05:05

## 2020-05-24 RX ADMIN — HEPARIN SODIUM 21 UNITS/KG/HR: 10000 INJECTION, SOLUTION INTRAVENOUS at 08:05

## 2020-05-24 RX ADMIN — ASPIRIN 81 MG: 81 TABLET, COATED ORAL at 08:05

## 2020-05-24 RX ADMIN — ISOSORBIDE MONONITRATE 30 MG: 30 TABLET, EXTENDED RELEASE ORAL at 08:05

## 2020-05-24 RX ADMIN — CLOPIDOGREL BISULFATE 75 MG: 75 TABLET ORAL at 08:05

## 2020-05-24 RX ADMIN — ATORVASTATIN CALCIUM 40 MG: 40 TABLET, FILM COATED ORAL at 08:05

## 2020-05-24 RX ADMIN — ALPRAZOLAM 0.5 MG: 0.5 TABLET ORAL at 03:05

## 2020-05-24 RX ADMIN — FUROSEMIDE 40 MG: 40 TABLET ORAL at 08:05

## 2020-05-24 RX ADMIN — CARVEDILOL 3.12 MG: 3.12 TABLET, FILM COATED ORAL at 08:05

## 2020-05-24 RX ADMIN — Medication 1 PATCH: at 08:05

## 2020-05-24 NOTE — SUBJECTIVE & OBJECTIVE
Interval History: No events overnight. Denies SOB, CP    Review of Systems  Objective:     Vital Signs (Most Recent):  Temp: 96.3 °F (35.7 °C) (05/24/20 0708)  Pulse: 95 (05/24/20 0708)  Resp: 18 (05/24/20 0708)  BP: 124/72 (05/24/20 0708)  SpO2: 98 % (05/24/20 0708) Vital Signs (24h Range):  Temp:  [96.3 °F (35.7 °C)-98.6 °F (37 °C)] 96.3 °F (35.7 °C)  Pulse:  [88-98] 95  Resp:  [17-18] 18  SpO2:  [95 %-98 %] 98 %  BP: (106-132)/(69-83) 124/72     Weight: 87.8 kg (193 lb 9 oz)  Body mass index is 28.58 kg/m².    Intake/Output Summary (Last 24 hours) at 5/24/2020 1054  Last data filed at 5/24/2020 0349  Gross per 24 hour   Intake 958.5 ml   Output 2225 ml   Net -1266.5 ml      Physical Exam  Constitutional: He is oriented to person, place, and time. He appears well-nourished.   HENT:   Mouth/Throat:  Eyes: EOM are normal.   Cardiovascular: Normal rate and regular rhythm.   Pulmonary/Chest: Effort normal. No stridor. No respiratory distress.    Good air movement   Abdominal: Soft. There is no tenderness.   Neurological: He is alert and oriented to person, place, and time. No cranial nerve deficit.   Skin: Skin is warm and dry.   Psychiatric: He has a normal mood and affect. His behavior is normal. Judgment normal  Significant Labs:   CBC:   Recent Labs   Lab 05/23/20 0524 05/24/20 0519   WBC 11.01 10.89   HGB 14.8 14.6   HCT 44.9 45.1    171     CMP:   Recent Labs   Lab 05/23/20 0524 05/24/20 0519     140 139  140   K 3.5  3.5 4.2  4.1     102 104  104   CO2 25  25 26  25     103 106  99   BUN 45*  44* 45*  43*   CREATININE 2.1*  2.0* 1.9*  1.9*   CALCIUM 9.0  9.2 9.8  9.8   PROT 7.3 7.2   ALBUMIN 3.4*  3.3* 3.3*  3.2*   BILITOT 0.3 0.4   ALKPHOS 136* 130   AST 18 13   ALT 12 16   ANIONGAP 13  13 9  11   EGFRNONAA 33*  35* 37*  37*       Significant Imaging: I have reviewed and interpreted all pertinent imaging results/findings within the past 24 hours.

## 2020-05-24 NOTE — SUBJECTIVE & OBJECTIVE
Past Medical History:   Diagnosis Date    COPD (chronic obstructive pulmonary disease)     Gout     Hypertension     NSTEMI (non-ST elevated myocardial infarction) 5/20/2020       Past Surgical History:   Procedure Laterality Date    FRACTURE SURGERY         Review of patient's allergies indicates:  No Known Allergies    Family History     None        Tobacco Use    Smoking status: Current Every Day Smoker     Packs/day: 1.00     Years: 40.00     Pack years: 40.00     Types: Cigarettes     Start date: 1/1/1979    Smokeless tobacco: Never Used   Substance and Sexual Activity    Alcohol use: Yes     Comment:  PINT/DAILY    Drug use: Not Currently    Sexual activity: Not Currently     Partners: Female         Review of Systems   Constitutional: Negative for activity change and diaphoresis.   HENT: Negative for rhinorrhea.    Respiratory: Positive for shortness of breath and wheezing. Negative for cough.    Cardiovascular: Positive for chest pain and leg swelling. Negative for palpitations.        Paroxysmal nocturnal dyspnea     Gastrointestinal: Negative.    Endocrine: Negative.    Genitourinary: Negative.    Musculoskeletal: Positive for back pain.   Skin: Negative.    Allergic/Immunologic: Negative.    Neurological: Negative for weakness.   Hematological: Negative.      Objective:     Vital Signs (Most Recent):  Temp: 98 °F (36.7 °C) (05/24/20 1224)  Pulse: 98 (05/24/20 1224)  Resp: 18 (05/24/20 1224)  BP: (!) 146/61 (05/24/20 1224)  SpO2: 97 % (05/24/20 1224) Vital Signs (24h Range):  Temp:  [96.3 °F (35.7 °C)-98.6 °F (37 °C)] 98 °F (36.7 °C)  Pulse:  [93-98] 98  Resp:  [17-18] 18  SpO2:  [95 %-98 %] 97 %  BP: (106-146)/(61-83) 146/61     Weight: 87.8 kg (193 lb 9 oz)  Body mass index is 28.58 kg/m².      Intake/Output Summary (Last 24 hours) at 5/24/2020 1358  Last data filed at 5/24/2020 0349  Gross per 24 hour   Intake 958.5 ml   Output 2225 ml   Net -1266.5 ml       Physical Exam   Constitutional: He  is oriented to person, place, and time. He appears well-developed and well-nourished.   HENT:   Head: Normocephalic and atraumatic.   Eyes: Pupils are equal, round, and reactive to light. Conjunctivae are normal.   Neck: Neck supple. No JVD present. No tracheal deviation present. No thyromegaly present.   Cardiovascular: Normal rate. A regularly irregular rhythm present. PMI is displaced.   Murmur heard.   Systolic murmur is present with a grade of 2/6.  Pulmonary/Chest: Effort normal. He has decreased breath sounds. He has no wheezes. He has rales in the right lower field and the left lower field.   Abdominal: Soft.   Musculoskeletal: Normal range of motion. He exhibits no edema.   Lymphadenopathy:     He has no cervical adenopathy.   Neurological: He is alert and oriented to person, place, and time.   Skin: Skin is warm and dry.   Nursing note and vitals reviewed.      Vents:  Oxygen Concentration (%): 24 (05/22/20 2352)    Lines/Drains/Airways     Peripheral Intravenous Line                 Peripheral IV - Single Lumen 05/20/20 0810 20 G Left Antecubital 4 days         Peripheral IV - Single Lumen 05/20/20 2240 20 G Left Hand 3 days                Significant Labs:    CBC/Anemia Profile:  Recent Labs   Lab 05/23/20  0524 05/24/20  0519   WBC 11.01 10.89   HGB 14.8 14.6   HCT 44.9 45.1    171   MCV 95 96   RDW 17.6* 17.7*        Chemistries:  Recent Labs   Lab 05/23/20  0524 05/24/20  0519     140 139  140   K 3.5  3.5 4.2  4.1     102 104  104   CO2 25  25 26  25   BUN 45*  44* 45*  43*   CREATININE 2.1*  2.0* 1.9*  1.9*   CALCIUM 9.0  9.2 9.8  9.8   ALBUMIN 3.4*  3.3* 3.3*  3.2*   PROT 7.3 7.2   BILITOT 0.3 0.4   ALKPHOS 136* 130   ALT 12 16   AST 18 13   PHOS 4.1 4.2       BMP:   Recent Labs   Lab 05/24/20  0519     99     140   K 4.2  4.1     104   CO2 26  25   BUN 45*  43*   CREATININE 1.9*  1.9*   CALCIUM 9.8  9.8     CMP:   Recent Labs   Lab  05/23/20  0524 05/24/20  0519     140 139  140   K 3.5  3.5 4.2  4.1     102 104  104   CO2 25  25 26  25     103 106  99   BUN 45*  44* 45*  43*   CREATININE 2.1*  2.0* 1.9*  1.9*   CALCIUM 9.0  9.2 9.8  9.8   PROT 7.3 7.2   ALBUMIN 3.4*  3.3* 3.3*  3.2*   BILITOT 0.3 0.4   ALKPHOS 136* 130   AST 18 13   ALT 12 16   ANIONGAP 13  13 9  11   EGFRNONAA 33*  35* 37*  37*     All pertinent labs within the past 24 hours have been reviewed.    Significant Imaging:   I have reviewed all pertinent imaging results/findings within the past 24 hours.  I have reviewed and interpreted all pertinent imaging results/findings within the past 24 hours.     US Lower Extrem Arteries Bilat with CRICKET (xpd)  Narrative: EXAMINATION:  US ARTERIAL LOWER EXTREMITY BILAT WITH CRICKET (XPD)    CLINICAL HISTORY:  Peripheral arterial disease;    FINDINGS:  No flow is detected in the left peroneal and posterior tibial arteries consistent with occlusion.  There are monophasic waveforms of the bilateral dorsalis pedis arteries and right posterior tibial artery with peak systolic velocities of 24 centimeters/second for the right DPA, 15.6 centimeters/second for the right PTA and 21.7 centimeters/second for the left DPA.  There are multiphase waveforms and normal velocities of the remaining bilateral lower extremity arteries.  No elevated peak systolic velocities identified.  Impression: Occlusion of the left peroneal and posterior tibial arteries.  Monophasic waveforms and mildly decreased flow of the right PTA and bilateral DPA.    Electronically signed by: Jose Luis Huerta MD  Date:    05/23/2020  Time:    15:10

## 2020-05-24 NOTE — PLAN OF CARE
POC reviewed with pt. Pt verbalizes understanding of POC. No questions at this time.  AAOx3. NADN.  NSR on cardiac monitor.  Heparin infusing at 21 units/kg/hr.  No complaint of chest pain or SOB.  Possible heart cath or stress test tomorrow.  Pt to be NPO after midnight tonight.   Pt remains free of falls.  No complaints at this time.  Safety measures in place. Will continue to monitor.  Informed pt to call for assistance before getting up. Pt verbalizes understanding.

## 2020-05-24 NOTE — PLAN OF CARE
POC reviewed. Pt verbalizes understanding. Telemetry monitoring pt NSR 90s. Heparin gtt 19 units/kg. PTT therapeutic.  Comfort measures and safety measures addressed. Will continue to monitor.  Problem: Fall Injury Risk  Goal: Absence of Fall and Fall-Related Injury  Outcome: Ongoing, Progressing     Problem: Adult Inpatient Plan of Care  Goal: Plan of Care Review  Outcome: Ongoing, Progressing  Goal: Patient-Specific Goal (Individualization)  Outcome: Ongoing, Progressing  Goal: Absence of Hospital-Acquired Illness or Injury  Outcome: Ongoing, Progressing  Goal: Optimal Comfort and Wellbeing  Outcome: Ongoing, Progressing  Goal: Readiness for Transition of Care  Outcome: Ongoing, Progressing  Goal: Rounds/Family Conference  Outcome: Ongoing, Progressing     Problem: Adjustment to Illness (Acute Coronary Syndrome)  Goal: Optimal Adaptation to Illness  Outcome: Ongoing, Progressing     Problem: Arrhythmia (Acute Coronary Syndrome)  Goal: Normalized Cardiac Rhythm  Outcome: Ongoing, Progressing     Problem: Pain (Acute Coronary Syndrome)  Goal: Absence of Cardiac-Related Pain  Outcome: Ongoing, Progressing     Problem: Hemodynamic Instability (Acute Coronary Syndrome)  Goal: Effective Cardiac Pump Function  Outcome: Ongoing, Progressing     Problem: Tissue Perfusion (Acute Coronary Syndrome)  Goal: Adequate Tissue Perfusion  Outcome: Ongoing, Progressing     Problem: Adjustment to Illness (Heart Failure)  Goal: Optimal Coping  Outcome: Ongoing, Progressing     Problem: Arrhythmia/Dysrhythmia (Heart Failure)  Goal: Stable Heart Rate and Rhythm  Outcome: Ongoing, Progressing     Problem: Cardiac Output Decreased (Heart Failure)  Goal: Optimal Cardiac Output  Outcome: Ongoing, Progressing     Problem: Fluid Imbalance (Heart Failure)  Goal: Fluid Balance  Outcome: Ongoing, Progressing     Problem: Functional Ability Impaired (Heart Failure)  Goal: Optimal Functional Ability  Outcome: Ongoing, Progressing     Problem: Oral  Intake Inadequate (Heart Failure)  Goal: Optimal Nutrition Intake  Outcome: Ongoing, Progressing     Problem: Respiratory Compromise (Heart Failure)  Goal: Effective Oxygenation and Ventilation  Outcome: Ongoing, Progressing     Problem: Sleep Disordered Breathing (Heart Failure)  Goal: Effective Breathing Pattern During Sleep  Outcome: Ongoing, Progressing     Problem: Bleeding  Goal: Absence of Bleeding  Outcome: Ongoing, Progressing

## 2020-05-24 NOTE — SUBJECTIVE & OBJECTIVE
Review of Systems   Constitution: Negative.   HENT: Negative.    Eyes: Negative.    Cardiovascular: Positive for dyspnea on exertion.   Respiratory: Positive for shortness of breath.    Endocrine: Negative.    Hematologic/Lymphatic: Negative.    Skin: Negative.    Musculoskeletal: Positive for arthritis and joint pain.   Gastrointestinal: Negative.    Genitourinary: Negative.    Neurological: Negative.    Psychiatric/Behavioral: Negative.    Allergic/Immunologic: Negative.      Objective:     Vital Signs (Most Recent):  Temp: 96.3 °F (35.7 °C) (05/24/20 0708)  Pulse: 95 (05/24/20 0708)  Resp: 18 (05/24/20 0708)  BP: 124/72 (05/24/20 0708)  SpO2: 98 % (05/24/20 0708) Vital Signs (24h Range):  Temp:  [96.3 °F (35.7 °C)-98.6 °F (37 °C)] 96.3 °F (35.7 °C)  Pulse:  [93-98] 95  Resp:  [17-18] 18  SpO2:  [95 %-98 %] 98 %  BP: (106-132)/(69-83) 124/72     Weight: 87.8 kg (193 lb 9 oz)  Body mass index is 28.58 kg/m².     SpO2: 98 %  O2 Device (Oxygen Therapy): room air      Intake/Output Summary (Last 24 hours) at 5/24/2020 1151  Last data filed at 5/24/2020 0349  Gross per 24 hour   Intake 958.5 ml   Output 2225 ml   Net -1266.5 ml       Lines/Drains/Airways     Peripheral Intravenous Line                 Peripheral IV - Single Lumen 05/20/20 0810 20 G Left Antecubital 4 days         Peripheral IV - Single Lumen 05/20/20 2240 20 G Left Hand 3 days                Physical Exam   Constitutional: He is oriented to person, place, and time. He appears well-developed and well-nourished.   HENT:   Head: Normocephalic.   Neck: Normal range of motion. Neck supple. Normal carotid pulses, no hepatojugular reflux and no JVD present. Carotid bruit is not present. No thyromegaly present.   Cardiovascular: Normal rate, regular rhythm, S1 normal and S2 normal. PMI is not displaced. Exam reveals no S3, no S4, no distant heart sounds, no friction rub, no midsystolic click and no opening snap.   No murmur heard.  Pulses:       Radial  pulses are 2+ on the right side, and 2+ on the left side.   Pulmonary/Chest: Effort normal and breath sounds normal. He has no wheezes. He has no rales.   Abdominal: Soft. Bowel sounds are normal. He exhibits no distension, no abdominal bruit, no ascites and no mass. There is no tenderness.   Musculoskeletal: He exhibits no edema.   Neurological: He is alert and oriented to person, place, and time.   Skin: Skin is warm.   Psychiatric: He has a normal mood and affect. His behavior is normal.   Nursing note and vitals reviewed.      Significant Labs:   BMP:   Recent Labs   Lab 05/23/20 0524 05/24/20 0519     103 106  99     140 139  140   K 3.5  3.5 4.2  4.1     102 104  104   CO2 25  25 26  25   BUN 45*  44* 45*  43*   CREATININE 2.1*  2.0* 1.9*  1.9*   CALCIUM 9.0  9.2 9.8  9.8   , CMP   Recent Labs   Lab 05/23/20 0524 05/24/20 0519     140 139  140   K 3.5  3.5 4.2  4.1     102 104  104   CO2 25  25 26  25     103 106  99   BUN 45*  44* 45*  43*   CREATININE 2.1*  2.0* 1.9*  1.9*   CALCIUM 9.0  9.2 9.8  9.8   PROT 7.3 7.2   ALBUMIN 3.4*  3.3* 3.3*  3.2*   BILITOT 0.3 0.4   ALKPHOS 136* 130   AST 18 13   ALT 12 16   ANIONGAP 13  13 9  11   ESTGFRAFRICA 38*  40* 43*  43*   EGFRNONAA 33*  35* 37*  37*   , CBC   Recent Labs   Lab 05/23/20 0524 05/24/20 0519   WBC 11.01 10.89   HGB 14.8 14.6   HCT 44.9 45.1    171   , INR No results for input(s): INR, PROTIME in the last 48 hours. and Troponin   Recent Labs   Lab 05/23/20 0524 05/24/20 0519   TROPONINI 5.876* 4.111*       Significant Imaging: Echocardiogram: 2D echo with color flow doppler: No results found for this or any previous visit.

## 2020-05-24 NOTE — PROGRESS NOTES
[facility] Hospital Medicine  Progress Note    Patient Name: Ceasar Hernandez  MRN: [id number]  Patient Class: IP- Inpatient   Admission Date: 5/20/2020  Length of Stay: 4 days  Attending Physician: Domingo De Paz MD  Primary Care Provider: Provider Notinsystem        Subjective:     Principal Problem:Acute combined systolic and diastolic CHF, NYHA class 4        HPI:  Pt is a 61 yo male with PMhx of COPD and HTN who was admitted to ICU in early AM of 5/21/2020 for Acute Respiratory Failure requiring BIPAP and decompensated combined heart failure. Pt reported WINSTON and lightheadedness. COVID 19 negative. Troponins were elevated 1.180 > 2.686 > 2.573 and Cardiology recommended heparin infusion. Pt was in acute CHF exacerbation and 2 d ECHO showed LVEF 20 % and diastolic dysfunction. Diuresis with IV lasix given. Initially, pt required supplemental oxygen via NIPPV which has thus been weaned. Also, pt received Plavix, BB, ACE inhibitor, ASA and STATIN. Pt's respiratory status improved and he was downgraded to Telemetry unit.     Overview/Hospital Course:  05/22/2020 Admitted 05/20/2020 to ICU for NSTEMI and Acute Combined Systolic and Diastolic heart failure. ECHO showed EF 20% and severe diastolic dysfunction. He stabilized and was transferred out of ICU to telemetry. Cardiology following. CV Meds include carvedilol, lisinopril, furosemide, isosorbide mononitrate, atorvastatin, and clopidogrel, aspirin. Symptoms MUCH improved. No present chest pain. Breathing much more comfortably. Good diuresis. Anticipating cardiac catheterization when more stable, probably Monday.    Interval History: No events overnight. Denies SOB, CP    Review of Systems  Objective:     Vital Signs (Most Recent):  Temp: 96.3 °F (35.7 °C) (05/24/20 0708)  Pulse: 95 (05/24/20 0708)  Resp: 18 (05/24/20 0708)  BP: 124/72 (05/24/20 0708)  SpO2: 98 % (05/24/20 0708) Vital Signs (24h Range):  Temp:  [96.3 °F (35.7 °C)-98.6 °F (37 °C)]  96.3 °F (35.7 °C)  Pulse:  [88-98] 95  Resp:  [17-18] 18  SpO2:  [95 %-98 %] 98 %  BP: (106-132)/(69-83) 124/72     Weight: 87.8 kg (193 lb 9 oz)  Body mass index is 28.58 kg/m².    Intake/Output Summary (Last 24 hours) at 5/24/2020 1054  Last data filed at 5/24/2020 0349  Gross per 24 hour   Intake 958.5 ml   Output 2225 ml   Net -1266.5 ml      Physical Exam  Constitutional: He is oriented to person, place, and time. He appears well-nourished.   HENT:   Mouth/Throat:  Eyes: EOM are normal.   Cardiovascular: Normal rate and regular rhythm.   Pulmonary/Chest: Effort normal. No stridor. No respiratory distress.    Good air movement   Abdominal: Soft. There is no tenderness.   Neurological: He is alert and oriented to person, place, and time. No cranial nerve deficit.   Skin: Skin is warm and dry.   Psychiatric: He has a normal mood and affect. His behavior is normal. Judgment normal  Significant Labs:   CBC:   Recent Labs   Lab 05/23/20  0524 05/24/20  0519   WBC 11.01 10.89   HGB 14.8 14.6   HCT 44.9 45.1    171     CMP:   Recent Labs   Lab 05/23/20  0524 05/24/20  0519     140 139  140   K 3.5  3.5 4.2  4.1     102 104  104   CO2 25  25 26  25     103 106  99   BUN 45*  44* 45*  43*   CREATININE 2.1*  2.0* 1.9*  1.9*   CALCIUM 9.0  9.2 9.8  9.8   PROT 7.3 7.2   ALBUMIN 3.4*  3.3* 3.3*  3.2*   BILITOT 0.3 0.4   ALKPHOS 136* 130   AST 18 13   ALT 12 16   ANIONGAP 13  13 9  11   EGFRNONAA 33*  35* 37*  37*       Significant Imaging: I have reviewed and interpreted all pertinent imaging results/findings within the past 24 hours.      Assessment/Plan:      * Acute combined systolic and diastolic CHF, NYHA class 4  Currently Euvolemic.  Will hold lasix     Anxiety  05/22/2020  ASSESSMENT: Anxiety about health, exacerbated by smoking cessation and insomnia. [facility] Controlled Prescription Drug Monitoring database was queried and showed no activity to  suggest abuse, diversion, or other inappropriate use of prescription medications.  PLAN:  Alprazolam PRN.        Leukocytosis  05/22/2020  ASSESSMENT: No infectious source. Could be reaction to corticosteroids or stress.   PLAN:  Monitor.    Recent Labs   Lab 05/20/20  0811 05/20/20  2240 05/21/20  0355 05/22/20  0707   WBC 13.83* 13.80* 16.00* 13.65*     Microbiology Results (last 7 days)     Procedure Component Value Units Date/Time    Blood culture x two cultures. Draw prior to antibiotics. [[id number]] Collected:  05/20/20 0814    Order Status:  Completed Specimen:  Blood from Peripheral, Antecubital, Right Updated:  05/22/20 1612     Blood Culture, Routine No Growth to date      No Growth to date      No Growth to date    Narrative:       Aerobic and anaerobic    Blood culture x two cultures. Draw prior to antibiotics. [[id number]] Collected:  05/20/20 0811    Order Status:  Completed Specimen:  Blood from Peripheral, Antecubital, Left Updated:  05/22/20 1612     Blood Culture, Routine No Growth to date      No Growth to date      No Growth to date    Narrative:       Aerobic and anaerobic         Antibiotics (From admission, onward)    None           Respiratory distress, acute  05/23/2020 RESOLVED.    Smoking history  05/23/2020  ASSESSMENT: He is currently at the action (quitting) stage of smoking cessation. Smoking cessation encouraged.    PLAN:  Continue nicotine patch. Referral to tobacco cessation program after discharge.      Lactic acidosis  05/22/2020  ASSESSMENT: RESOLVED.  Recent Labs   Lab 05/20/20  0811 05/20/20  2240 05/21/20  1151   LACTATE 6.3* 2.3* 1.8        COPD (chronic obstructive pulmonary disease)  05/23/2020  ASSESSMENT: Chronic condition. Maintaining SpO2 on room air.   PLAN:  Continue present treatment plan.        Essential hypertension  05/23/2020  ASSESSMENT: Controlled.   PLAN:  Continue present treatment plan.  Temp:  [96.9 °F (36.1 °C)-98.3 °F (36.8 °C)] 97 °F (36.1 °C)  Pulse:   [] 96  Resp:  [16-20] 18  SpO2:  [93 %-97 %] 95 %  BP: (101-132)/(64-81) 132/75        NSTEMI (non-ST elevated myocardial infarction)  On aspirin, plavix  Coreg,lipitor, heparin drip. Flora score 130. Cards planning [facility]. Hold ACEI due to worsening kidney functions     Abnormal ECG          VTE Risk Mitigation (From admission, onward)         Ordered     heparin 25,000 units in dextrose 5% 250 mL (100 units/mL) infusion LOW INTENSITY nomogram - [facility]  Continuous     Question:  Heparin Infusion Adjustment (DO NOT MODIFY ANSWER)  Answer:  \[email or web address]\epic\Images\Pharmacy\HeparinInfusions\heparin LOW INTENSITY nomogram for [facility] [id number].pdf    05/20/20 2103     heparin 25,000 units in dextrose 5% (100 units/ml) IV bolus from bag - ADDITIONAL PRN BOLUS - 60 units/kg (max bolus 4000 units)  As needed (PRN)     Question:  Heparin Infusion Adjustment (DO NOT MODIFY ANSWER)  Answer:  \[email or web address]\epic\Images\Pharmacy\HeparinInfusions\heparin LOW INTENSITY nomogram for [facility] [id number].pdf    05/20/20 2103     heparin 25,000 units in dextrose 5% (100 units/ml) IV bolus from bag - ADDITIONAL PRN BOLUS - 30 units/kg (max bolus 4000 units)  As needed (PRN)     Question:  Heparin Infusion Adjustment (DO NOT MODIFY ANSWER)  Answer:  \[email or web address]\epic\Images\Pharmacy\HeparinInfusions\heparin LOW INTENSITY nomogram for [facility] [id number].pdf    05/20/20 2103     IP VTE HIGH RISK PATIENT  Once      05/20/20 2058     Place sequential compression device  Until discontinued      05/20/20 2058                      Domingo De Paz MD  Department of Hospital Medicine   [facility]

## 2020-05-24 NOTE — ASSESSMENT & PLAN NOTE
Does not appear to be in exacerbation.    Supplement oxygenation. Keep SAO2 >= 92%.                       Continue with LABA, LUCERO as needed        Continue nighttime BiPAP 10 / 5 and as needed.                                         5/23 Continue above  5/24 continue above PRN

## 2020-05-24 NOTE — PROGRESS NOTES
[facility] -   Cardiology  Progress Note    Patient Name: Ceasar Hernandez  MRN: [id number]  Admission Date: 5/20/2020  Hospital Length of Stay: 4 days  Code Status: Full Code   Attending Physician: Domingo De Paz MD   Primary Care Physician: Provider Notinsystem  Expected Discharge Date:   Principal Problem:Acute combined systolic and diastolic CHF, NYHA class 4    Subjective:     HPI:  Pt presents with acute respiratory failure.  Per ER chart, suspicious for Covid 19.  Cardiology consult performed based on communication from ER physician and chart review.  He has h/o COPD.  Pt presented to ER with c/o dyspnea since yesterday with associated wheezing.  Per chart no cp sxs.  ECG in ER showed possible SVT.  Adenosine administered and f/u ecgs looked more like sinus tachycardia.  BP very high on arrival 217/109.  Given Labetalol in ER.    BNP 1194  Troponin 0.09  Lactic acid +  ecg with inferolateral st-t abnl.  No old records to compare/review.    Hospital Course:   5/21/20:  PT SEEN AND EXAMINED IN ICU.  HE FELT REMARKABLY IMPROVED, ON NASAL CANNULA.  DENIES CHEST PAIN SXS.  DYSPNEA MUCH IMPROVED.  TROPONIN PEAK NEAR 2.6  BP HAS BEEN VERY HIGH. LACTIC ACID NORMAL NOW.  CREATININE STABLE.    5/22/2020-Patient seen and examined today, resting in bed. Feeling much better, SOB greatly improved. Some mild chest pressure, overnight, has since resolved. Labs reviewed. Troponin> 6 this AM, will repeat later today. Creatinine 1.9. BNP improved but still elevated.     5/23/20:  PT SEEN AND EXAMINED THIS AM.  NO CP SXS.  STILL WITH SOME DYSPNEA BUT MUCH IMPROVED.  LABS STABLE OVERALL. CREATININE BUMPED UP SLIGHTLY.  TROPONIN TRENDING DOWN.  BP MUCH IMPROVED.  ALSO DRINKS 1 PINT ETOH EVERY FEW DAYS.     5/24/20:  PT SEEN AND EXAMINED THIS AM. FELT BETTER.  NO RECURRENT CP.  DYSPNEA IMPROVED.  CREATININE STABLE, 1.9.  BNP MUCH IMPROVED.  BP REMAINS WELL CONTROLLED NOW.  + ABNL VASC STUDIES INFRAPOPLITEAL  DISEASE.        Review of Systems   Constitution: Negative.   HENT: Negative.    Eyes: Negative.    Cardiovascular: Positive for dyspnea on exertion.   Respiratory: Positive for shortness of breath.    Endocrine: Negative.    Hematologic/Lymphatic: Negative.    Skin: Negative.    Musculoskeletal: Positive for arthritis and joint pain.   Gastrointestinal: Negative.    Genitourinary: Negative.    Neurological: Negative.    Psychiatric/Behavioral: Negative.    Allergic/Immunologic: Negative.      Objective:     Vital Signs (Most Recent):  Temp: 96.3 °F (35.7 °C) (05/24/20 0708)  Pulse: 95 (05/24/20 0708)  Resp: 18 (05/24/20 0708)  BP: 124/72 (05/24/20 0708)  SpO2: 98 % (05/24/20 0708) Vital Signs (24h Range):  Temp:  [96.3 °F (35.7 °C)-98.6 °F (37 °C)] 96.3 °F (35.7 °C)  Pulse:  [93-98] 95  Resp:  [17-18] 18  SpO2:  [95 %-98 %] 98 %  BP: (106-132)/(69-83) 124/72     Weight: 87.8 kg (193 lb 9 oz)  Body mass index is 28.58 kg/m².     SpO2: 98 %  O2 Device (Oxygen Therapy): room air      Intake/Output Summary (Last 24 hours) at 5/24/2020 1151  Last data filed at 5/24/2020 0349  Gross per 24 hour   Intake 958.5 ml   Output 2225 ml   Net -1266.5 ml       Lines/Drains/Airways     Peripheral Intravenous Line                 Peripheral IV - Single Lumen 05/20/20 0810 20 G Left Antecubital 4 days         Peripheral IV - Single Lumen 05/20/20 2240 20 G Left Hand 3 days                Physical Exam   Constitutional: He is oriented to person, place, and time. He appears well-developed and well-nourished.   HENT:   Head: Normocephalic.   Neck: Normal range of motion. Neck supple. Normal carotid pulses, no hepatojugular reflux and no JVD present. Carotid bruit is not present. No thyromegaly present.   Cardiovascular: Normal rate, regular rhythm, S1 normal and S2 normal. PMI is not displaced. Exam reveals no S3, no S4, no distant heart sounds, no friction rub, no midsystolic click and no opening snap.   No murmur heard.  Pulses:        Radial pulses are 2+ on the right side, and 2+ on the left side.   Pulmonary/Chest: Effort normal and breath sounds normal. He has no wheezes. He has no rales.   Abdominal: Soft. Bowel sounds are normal. He exhibits no distension, no abdominal bruit, no ascites and no mass. There is no tenderness.   Musculoskeletal: He exhibits no edema.   Neurological: He is alert and oriented to person, place, and time.   Skin: Skin is warm.   Psychiatric: He has a normal mood and affect. His behavior is normal.   Nursing note and vitals reviewed.      Significant Labs:   BMP:   Recent Labs   Lab 05/23/20 0524 05/24/20 0519     103 106  99     140 139  140   K 3.5  3.5 4.2  4.1     102 104  104   CO2 25  25 26  25   BUN 45*  44* 45*  43*   CREATININE 2.1*  2.0* 1.9*  1.9*   CALCIUM 9.0  9.2 9.8  9.8   , CMP   Recent Labs   Lab 05/23/20 0524 05/24/20 0519     140 139  140   K 3.5  3.5 4.2  4.1     102 104  104   CO2 25  25 26  25     103 106  99   BUN 45*  44* 45*  43*   CREATININE 2.1*  2.0* 1.9*  1.9*   CALCIUM 9.0  9.2 9.8  9.8   PROT 7.3 7.2   ALBUMIN 3.4*  3.3* 3.3*  3.2*   BILITOT 0.3 0.4   ALKPHOS 136* 130   AST 18 13   ALT 12 16   ANIONGAP 13  13 9  11   ESTGFRAFRICA 38*  40* 43*  43*   EGFRNONAA 33*  35* 37*  37*   , CBC   Recent Labs   Lab 05/23/20 0524 05/24/20 0519   WBC 11.01 10.89   HGB 14.8 14.6   HCT 44.9 45.1    171   , INR No results for input(s): INR, PROTIME in the last 48 hours. and Troponin   Recent Labs   Lab 05/23/20 0524 05/24/20 0519   TROPONINI 5.876* 4.111*       Significant Imaging: Echocardiogram: 2D echo with color flow doppler: No results found for this or any previous visit.    Assessment and Plan:     ACUTE CHF EXACERBATION -- MUCH IMPROVED.  NSTEMI  HIGH PROBABILITY FOR UNDERLYING MULTIVESSEL CAD.  DISCUSSED INDICATIONS, RISKS/BENEFITS FOR [facility] TO INCLUDE POSSIBLE HIGH RISK PCI.  MAY NEED IMPELLA OR IABP  SUPPORT FOR PCI.    PT UNDERSTANDS HIGH RISK FEATURES FOR COMPLICATIONS, RISK FOR CONTRAST NEPHROPATHY.  HE IS WILLING TO PROCEED.  IT WOULD BE DR. RODRÍGUEZ IN CATH LAB TOMORROW.  WILL DISCUSS WITH HIM AND HE WILL HAVE FINAL DETERMINATION ON PROCEEDING WITH CATH AS NOTED ABOVE.  NPO AFTER MIDNIGHT.  GENTLE IV FLUID HYDRATION OVERNIGHT.  RECHECK LABS IN AM.  RENAL FUNCTION IS STABLE.  STAY ON ACE-I STARTED THIS ADMIT -- DID NOT MAKE RENAL FUNCTION WORSE AND NEEDS IT FOR HIS CHF (HOSPITAL MED STOPPED LISINOPRIL THIS AM).  RESTART LASIX AFTER CATH PROCEDURE.  STOP LASIX TODAY.  CONTINUE OTHER MEDS.  MED TX FOR PAD FOR NOW.      * Acute combined systolic and diastolic CHF, NYHA class 4  -Presents with acute decompensated combined CHF  -EF 20%  -Continue IV diuresis for additional day  -Continue BB, ACEi  -Add Imdur  -Strict I's/O's  -Ischemic evaluation prior to d/c    CRI (chronic renal insufficiency)  See management plan detailed above.       ETOH abuse  Pt counseled on cessation of alcohol.    PAD (peripheral artery disease)   See management plan detailed above.     Smoking history  -Smoking cessation      Lactic acidosis  -Normalized since admission    COPD (chronic obstructive pulmonary disease)  -Per hospital medicine, critical care mgt.    Essential hypertension  -BP improved  -Continue Coreg, ACEi  -Imdur 30 mg daily added  -Monitor    NSTEMI (non-ST elevated myocardial infarction)  -Troponin > 6 this AM, repeat this afternoon  -Continue ASA, Plavix, BB, statin, ACEi  -Add Imdur 30 mg daily  -Continue heparin gtt for additional day  -EF 20%  -Will need ischemic evaluation with probable LHC pending clinical course    Abnormal ECG  -Ischemic evaluation with LHC vs MPI stress test pending clinical course        VTE Risk Mitigation (From admission, onward)         Ordered     heparin 25,000 units in dextrose 5% 250 mL (100 units/mL) infusion LOW INTENSITY nomogram - [facility]  Continuous     Question:  Heparin Infusion  Adjustment (DO NOT MODIFY ANSWER)  Answer:  \\[email or web address]\epic\Images\Pharmacy\HeparinInfusions\heparin LOW INTENSITY nomogram for [facility] [id number].pdf    05/20/20 2103     heparin 25,000 units in dextrose 5% (100 units/ml) IV bolus from bag - ADDITIONAL PRN BOLUS - 60 units/kg (max bolus 4000 units)  As needed (PRN)     Question:  Heparin Infusion Adjustment (DO NOT MODIFY ANSWER)  Answer:  [email or web address]\epic\Images\Pharmacy\HeparinInfusions\heparin LOW INTENSITY nomogram for [facility] [id number].pdf    05/20/20 2103     heparin 25,000 units in dextrose 5% (100 units/ml) IV bolus from bag - ADDITIONAL PRN BOLUS - 30 units/kg (max bolus 4000 units)  As needed (PRN)     Question:  Heparin Infusion Adjustment (DO NOT MODIFY ANSWER)  Answer:  \\[email or web address]\epic\Images\Pharmacy\HeparinInfusions\heparin LOW INTENSITY nomogram for [facility] [id number].pdf    05/20/20 2103     IP VTE HIGH RISK PATIENT  Once      05/20/20 2058     Place sequential compression device  Until discontinued      05/20/20 2058                Rom Hall MD  Cardiology  [facility]

## 2020-05-24 NOTE — PROGRESS NOTES
[facility] -   Pulmonology  Progress Note    Patient Name: Ceasar Hernandez  MRN: [id number]  Admission Date: 5/20/2020  Hospital Length of Stay: 4 days  Code Status: Full Code  Attending Provider: Domingo De Paz MD  Primary Care Provider: Provider Notinsystem   Principal Problem: Acute combined systolic and diastolic CHF, NYHA class 4    Subjective:     Past Medical History:   Diagnosis Date    COPD (chronic obstructive pulmonary disease)     Gout     Hypertension     NSTEMI (non-ST elevated myocardial infarction) 5/20/2020       Past Surgical History:   Procedure Laterality Date    FRACTURE SURGERY         Review of patient's allergies indicates:  No Known Allergies    Family History     None        Tobacco Use    Smoking status: Current Every Day Smoker     Packs/day: 1.00     Years: 40.00     Pack years: 40.00     Types: Cigarettes     Start date: 1/1/1979    Smokeless tobacco: Never Used   Substance and Sexual Activity    Alcohol use: Yes     Comment:  PINT/DAILY    Drug use: Not Currently    Sexual activity: Not Currently     Partners: Female         Review of Systems   Constitutional: Negative for activity change and diaphoresis.   HENT: Negative for rhinorrhea.    Respiratory: Positive for shortness of breath and wheezing. Negative for cough.    Cardiovascular: Positive for chest pain and leg swelling. Negative for palpitations.        Paroxysmal nocturnal dyspnea     Gastrointestinal: Negative.    Endocrine: Negative.    Genitourinary: Negative.    Musculoskeletal: Positive for back pain.   Skin: Negative.    Allergic/Immunologic: Negative.    Neurological: Negative for weakness.   Hematological: Negative.      Objective:     Vital Signs (Most Recent):  Temp: 98 °F (36.7 °C) (05/24/20 1224)  Pulse: 98 (05/24/20 1224)  Resp: 18 (05/24/20 1224)  BP: (!) 146/61 (05/24/20 1224)  SpO2: 97 % (05/24/20 1224) Vital Signs (24h Range):  Temp:  [96.3 °F (35.7 °C)-98.6 °F (37 °C)] 98 °F (36.7  °C)  Pulse:  [93-98] 98  Resp:  [17-18] 18  SpO2:  [95 %-98 %] 97 %  BP: (106-146)/(61-83) 146/61     Weight: 87.8 kg (193 lb 9 oz)  Body mass index is 28.58 kg/m².      Intake/Output Summary (Last 24 hours) at 5/24/2020 1358  Last data filed at 5/24/2020 0349  Gross per 24 hour   Intake 958.5 ml   Output 2225 ml   Net -1266.5 ml       Physical Exam   Constitutional: He is oriented to person, place, and time. He appears well-developed and well-nourished.   HENT:   Head: Normocephalic and atraumatic.   Eyes: Pupils are equal, round, and reactive to light. Conjunctivae are normal.   Neck: Neck supple. No JVD present. No tracheal deviation present. No thyromegaly present.   Cardiovascular: Normal rate. A regularly irregular rhythm present. PMI is displaced.   Murmur heard.   Systolic murmur is present with a grade of 2/6.  Pulmonary/Chest: Effort normal. He has decreased breath sounds. He has no wheezes. He has rales in the right lower field and the left lower field.   Abdominal: Soft.   Musculoskeletal: Normal range of motion. He exhibits no edema.   Lymphadenopathy:     He has no cervical adenopathy.   Neurological: He is alert and oriented to person, place, and time.   Skin: Skin is warm and dry.   Nursing note and vitals reviewed.      Vents:  Oxygen Concentration (%): 24 (05/22/20 2352)    Lines/Drains/Airways     Peripheral Intravenous Line                 Peripheral IV - Single Lumen 05/20/20 0810 20 G Left Antecubital 4 days         Peripheral IV - Single Lumen 05/20/20 2240 20 G Left Hand 3 days                Significant Labs:    CBC/Anemia Profile:  Recent Labs   Lab 05/23/20  0524 05/24/20  0519   WBC 11.01 10.89   HGB 14.8 14.6   HCT 44.9 45.1    171   MCV 95 96   RDW 17.6* 17.7*        Chemistries:  Recent Labs   Lab 05/23/20  0524 05/24/20  0519     140 139  140   K 3.5  3.5 4.2  4.1     102 104  104   CO2 25  25 26  25   BUN 45*  44* 45*  43*   CREATININE 2.1*  2.0* 1.9*   1.9*   CALCIUM 9.0  9.2 9.8  9.8   ALBUMIN 3.4*  3.3* 3.3*  3.2*   PROT 7.3 7.2   BILITOT 0.3 0.4   ALKPHOS 136* 130   ALT 12 16   AST 18 13   PHOS 4.1 4.2       BMP:   Recent Labs   Lab 05/24/20  0519     99     140   K 4.2  4.1     104   CO2 26  25   BUN 45*  43*   CREATININE 1.9*  1.9*   CALCIUM 9.8  9.8     CMP:   Recent Labs   Lab 05/23/20  0524 05/24/20  0519     140 139  140   K 3.5  3.5 4.2  4.1     102 104  104   CO2 25  25 26  25     103 106  99   BUN 45*  44* 45*  43*   CREATININE 2.1*  2.0* 1.9*  1.9*   CALCIUM 9.0  9.2 9.8  9.8   PROT 7.3 7.2   ALBUMIN 3.4*  3.3* 3.3*  3.2*   BILITOT 0.3 0.4   ALKPHOS 136* 130   AST 18 13   ALT 12 16   ANIONGAP 13  13 9  11   EGFRNONAA 33*  35* 37*  37*     All pertinent labs within the past 24 hours have been reviewed.    Significant Imaging:   I have reviewed all pertinent imaging results/findings within the past 24 hours.  I have reviewed and interpreted all pertinent imaging results/findings within the past 24 hours.     US Lower Extrem Arteries Bilat with CRICKET (xpd)  Narrative: EXAMINATION:  US ARTERIAL LOWER EXTREMITY BILAT WITH CRICKET (XPD)    CLINICAL HISTORY:  Peripheral arterial disease;    FINDINGS:  No flow is detected in the left peroneal and posterior tibial arteries consistent with occlusion.  There are monophasic waveforms of the bilateral dorsalis pedis arteries and right posterior tibial artery with peak systolic velocities of 24 centimeters/second for the right DPA, 15.6 centimeters/second for the right PTA and 21.7 centimeters/second for the left DPA.  There are multiphase waveforms and normal velocities of the remaining bilateral lower extremity arteries.  No elevated peak systolic velocities identified.  Impression: Occlusion of the left peroneal and posterior tibial arteries.  Monophasic waveforms and mildly decreased flow of the right PTA and bilateral DPA.    Electronically  signed by: Jose Luis Huerta MD  Date:    05/23/2020  Time:    15:10          ABG  Recent Labs   Lab 05/20/20  0827   PH 7.185*   PO2 82   PCO2 46.7*   HCO3 17.6*   BE -11     Assessment/Plan:     Respiratory distress, acute  Needs evaluation for home oxygen prior to discharge  5/24 improved    COPD (chronic obstructive pulmonary disease)  Does not appear to be in exacerbation.    Supplement oxygenation. Keep SAO2 >= 92%.                       Continue with LABA, LUCERO as needed        Continue nighttime BiPAP 10 / 5 and as needed.                                         5/23 Continue above  5/24 continue above PRN           Iglesia Montenegro MD  Pulmonology  [facility] [place]

## 2020-05-25 LAB
ALBUMIN SERPL BCP-MCNC: 3 G/DL (ref 3.5–5.2)
ALBUMIN SERPL BCP-MCNC: 3 G/DL (ref 3.5–5.2)
ALP SERPL-CCNC: 111 U/L (ref 55–135)
ALT SERPL W/O P-5'-P-CCNC: 13 U/L (ref 10–44)
ANION GAP SERPL CALC-SCNC: 12 MMOL/L (ref 8–16)
ANION GAP SERPL CALC-SCNC: 12 MMOL/L (ref 8–16)
APTT BLDCRRT: 48.4 SEC (ref 21–32)
AST SERPL-CCNC: 8 U/L (ref 10–40)
BACTERIA BLD CULT: NORMAL
BACTERIA BLD CULT: NORMAL
BASOPHILS # BLD AUTO: 0.06 K/UL (ref 0–0.2)
BASOPHILS NFR BLD: 0.5 % (ref 0–1.9)
BILIRUB SERPL-MCNC: 0.4 MG/DL (ref 0.1–1)
BUN SERPL-MCNC: 36 MG/DL (ref 8–23)
BUN SERPL-MCNC: 41 MG/DL (ref 8–23)
CALCIUM SERPL-MCNC: 9.3 MG/DL (ref 8.7–10.5)
CALCIUM SERPL-MCNC: 9.4 MG/DL (ref 8.7–10.5)
CHLORIDE SERPL-SCNC: 102 MMOL/L (ref 95–110)
CHLORIDE SERPL-SCNC: 104 MMOL/L (ref 95–110)
CO2 SERPL-SCNC: 24 MMOL/L (ref 23–29)
CO2 SERPL-SCNC: 25 MMOL/L (ref 23–29)
CREAT SERPL-MCNC: 2 MG/DL (ref 0.5–1.4)
CREAT SERPL-MCNC: 2 MG/DL (ref 0.5–1.4)
DIFFERENTIAL METHOD: ABNORMAL
EOSINOPHIL # BLD AUTO: 0.1 K/UL (ref 0–0.5)
EOSINOPHIL NFR BLD: 0.9 % (ref 0–8)
ERYTHROCYTE [DISTWIDTH] IN BLOOD BY AUTOMATED COUNT: 17.3 % (ref 11.5–14.5)
EST. GFR  (AFRICAN AMERICAN): 40 ML/MIN/1.73 M^2
EST. GFR  (AFRICAN AMERICAN): 40 ML/MIN/1.73 M^2
EST. GFR  (NON AFRICAN AMERICAN): 35 ML/MIN/1.73 M^2
EST. GFR  (NON AFRICAN AMERICAN): 35 ML/MIN/1.73 M^2
GLUCOSE SERPL-MCNC: 106 MG/DL (ref 70–110)
GLUCOSE SERPL-MCNC: 108 MG/DL (ref 70–110)
HCT VFR BLD AUTO: 41.4 % (ref 40–54)
HGB BLD-MCNC: 13.4 G/DL (ref 14–18)
IMM GRANULOCYTES # BLD AUTO: 0.16 K/UL (ref 0–0.04)
IMM GRANULOCYTES NFR BLD AUTO: 1.4 % (ref 0–0.5)
LYMPHOCYTES # BLD AUTO: 1.6 K/UL (ref 1–4.8)
LYMPHOCYTES NFR BLD: 13.7 % (ref 18–48)
MCH RBC QN AUTO: 31.4 PG (ref 27–31)
MCHC RBC AUTO-ENTMCNC: 32.4 G/DL (ref 32–36)
MCV RBC AUTO: 97 FL (ref 82–98)
MONOCYTES # BLD AUTO: 1.6 K/UL (ref 0.3–1)
MONOCYTES NFR BLD: 13.7 % (ref 4–15)
NEUTROPHILS # BLD AUTO: 8 K/UL (ref 1.8–7.7)
NEUTROPHILS NFR BLD: 69.8 % (ref 38–73)
NRBC BLD-RTO: 0 /100 WBC
PHOSPHATE SERPL-MCNC: 4 MG/DL (ref 2.7–4.5)
PLATELET # BLD AUTO: 165 K/UL (ref 150–350)
PMV BLD AUTO: 13.1 FL (ref 9.2–12.9)
POTASSIUM SERPL-SCNC: 3.8 MMOL/L (ref 3.5–5.1)
POTASSIUM SERPL-SCNC: 3.8 MMOL/L (ref 3.5–5.1)
PROT SERPL-MCNC: 7 G/DL (ref 6–8.4)
RBC # BLD AUTO: 4.27 M/UL (ref 4.6–6.2)
SODIUM SERPL-SCNC: 139 MMOL/L (ref 136–145)
SODIUM SERPL-SCNC: 140 MMOL/L (ref 136–145)
WBC # BLD AUTO: 11.46 K/UL (ref 3.9–12.7)

## 2020-05-25 PROCEDURE — S4991 NICOTINE PATCH NONLEGEND: HCPCS | Performed by: INTERNAL MEDICINE

## 2020-05-25 PROCEDURE — 99232 PR SUBSEQUENT HOSPITAL CARE,LEVL II: ICD-10-PCS | Mod: ,,, | Performed by: INTERNAL MEDICINE

## 2020-05-25 PROCEDURE — 80069 RENAL FUNCTION PANEL: CPT

## 2020-05-25 PROCEDURE — 25000003 PHARM REV CODE 250: Performed by: INTERNAL MEDICINE

## 2020-05-25 PROCEDURE — 63600175 PHARM REV CODE 636 W HCPCS: Performed by: INTERNAL MEDICINE

## 2020-05-25 PROCEDURE — 85730 THROMBOPLASTIN TIME PARTIAL: CPT

## 2020-05-25 PROCEDURE — 80053 COMPREHEN METABOLIC PANEL: CPT

## 2020-05-25 PROCEDURE — 25000003 PHARM REV CODE 250: Performed by: FAMILY MEDICINE

## 2020-05-25 PROCEDURE — 21400001 HC TELEMETRY ROOM

## 2020-05-25 PROCEDURE — 99232 SBSQ HOSP IP/OBS MODERATE 35: CPT | Mod: ,,, | Performed by: INTERNAL MEDICINE

## 2020-05-25 PROCEDURE — 25000003 PHARM REV CODE 250: Performed by: PHYSICIAN ASSISTANT

## 2020-05-25 PROCEDURE — 85025 COMPLETE CBC W/AUTO DIFF WBC: CPT

## 2020-05-25 RX ORDER — CHLORDIAZEPOXIDE HYDROCHLORIDE 10 MG/1
10 CAPSULE, GELATIN COATED ORAL 3 TIMES DAILY
Status: DISCONTINUED | OUTPATIENT
Start: 2020-05-25 | End: 2020-05-27

## 2020-05-25 RX ORDER — SODIUM CHLORIDE 9 MG/ML
INJECTION, SOLUTION INTRAVENOUS CONTINUOUS
Status: DISCONTINUED | OUTPATIENT
Start: 2020-05-26 | End: 2020-05-26

## 2020-05-25 RX ADMIN — CARVEDILOL 3.12 MG: 3.12 TABLET, FILM COATED ORAL at 08:05

## 2020-05-25 RX ADMIN — CLOPIDOGREL BISULFATE 75 MG: 75 TABLET ORAL at 08:05

## 2020-05-25 RX ADMIN — HEPARIN SODIUM 21 UNITS/KG/HR: 10000 INJECTION, SOLUTION INTRAVENOUS at 11:05

## 2020-05-25 RX ADMIN — CHLORDIAZEPOXIDE HYDROCHLORIDE 10 MG: 10 CAPSULE ORAL at 02:05

## 2020-05-25 RX ADMIN — ALPRAZOLAM 0.5 MG: 0.5 TABLET ORAL at 08:05

## 2020-05-25 RX ADMIN — ASPIRIN 81 MG: 81 TABLET, COATED ORAL at 08:05

## 2020-05-25 RX ADMIN — CHLORDIAZEPOXIDE HYDROCHLORIDE 10 MG: 10 CAPSULE ORAL at 08:05

## 2020-05-25 RX ADMIN — CHLORDIAZEPOXIDE HYDROCHLORIDE 10 MG: 10 CAPSULE ORAL at 12:05

## 2020-05-25 RX ADMIN — ISOSORBIDE MONONITRATE 30 MG: 30 TABLET, EXTENDED RELEASE ORAL at 08:05

## 2020-05-25 RX ADMIN — ATORVASTATIN CALCIUM 40 MG: 40 TABLET, FILM COATED ORAL at 08:05

## 2020-05-25 RX ADMIN — Medication 1 PATCH: at 08:05

## 2020-05-25 NOTE — ASSESSMENT & PLAN NOTE
-Patient counseled on cessation of alcohol.  -Concerned about possible DT's, discussed with hospital medicine

## 2020-05-25 NOTE — SUBJECTIVE & OBJECTIVE
Review of Systems   Constitution: Positive for malaise/fatigue.   HENT: Negative.    Eyes: Negative.    Cardiovascular: Negative.    Respiratory: Negative.    Endocrine: Negative.    Hematologic/Lymphatic: Negative.    Skin: Negative.    Musculoskeletal: Negative.    Gastrointestinal: Negative.    Genitourinary: Negative.    Neurological: Negative.    Psychiatric/Behavioral: Negative.    Allergic/Immunologic: Negative.      Objective:     Vital Signs (Most Recent):  Temp: 98.4 °F (36.9 °C) (05/25/20 1153)  Pulse: 94 (05/25/20 1153)  Resp: 18 (05/25/20 1153)  BP: 106/68 (05/25/20 1153)  SpO2: 97 % (05/25/20 1153) Vital Signs (24h Range):  Temp:  [97.3 °F (36.3 °C)-98.6 °F (37 °C)] 98.4 °F (36.9 °C)  Pulse:  [] 94  Resp:  [16-18] 18  SpO2:  [96 %-99 %] 97 %  BP: ()/(61-82) 106/68     Weight: 88.7 kg (195 lb 8.8 oz)  Body mass index is 28.88 kg/m².     SpO2: 97 %  O2 Device (Oxygen Therapy): (S) room air(rt placed pt on ra)      Intake/Output Summary (Last 24 hours) at 5/25/2020 1205  Last data filed at 5/25/2020 0500  Gross per 24 hour   Intake 1589.83 ml   Output 1250 ml   Net 339.83 ml       Lines/Drains/Airways     Peripheral Intravenous Line                 Peripheral IV - Single Lumen 05/20/20 2240 20 G Left Hand 4 days                Physical Exam   Constitutional: He is oriented to person, place, and time. He appears well-developed and well-nourished. No distress.   HENT:   Head: Normocephalic and atraumatic.   Eyes: Pupils are equal, round, and reactive to light. Right eye exhibits no discharge. Left eye exhibits no discharge.   Neck: Neck supple. No JVD present.   Cardiovascular: Normal rate, regular rhythm, S1 normal, S2 normal and normal heart sounds.   No murmur heard.  Pulmonary/Chest: Effort normal and breath sounds normal. No respiratory distress. He has no wheezes. He has no rales.   Abdominal: Soft. He exhibits no distension.   Musculoskeletal: He exhibits no edema.   Neurological: He  is alert and oriented to person, place, and time.   Skin: Skin is warm and dry. He is not diaphoretic. No erythema.   Psychiatric: He has a normal mood and affect. His behavior is normal. Thought content normal.   Nursing note and vitals reviewed.      Significant Labs:   CMP   Recent Labs   Lab 05/24/20 0519 05/25/20 0427     140 140  139   K 4.2  4.1 3.8  3.8     104 104  102   CO2 26  25 24  25     99 106  108   BUN 45*  43* 41*  36*   CREATININE 1.9*  1.9* 2.0*  2.0*   CALCIUM 9.8  9.8 9.3  9.4   PROT 7.2 7.0   ALBUMIN 3.3*  3.2* 3.0*  3.0*   BILITOT 0.4 0.4   ALKPHOS 130 111   AST 13 8*   ALT 16 13   ANIONGAP 9  11 12  12   ESTGFRAFRICA 43*  43* 40*  40*   EGFRNONAA 37*  37* 35*  35*   , CBC   Recent Labs   Lab 05/24/20 0519 05/25/20  0428   WBC 10.89 11.46   HGB 14.6 13.4*   HCT 45.1 41.4    165   , Troponin   Recent Labs   Lab 05/24/20 0519   TROPONINI 4.111*    and All pertinent lab results from the last 24 hours have been reviewed.    Significant Imaging: Echocardiogram: 2D echo with color flow doppler: No results found for this or any previous visit., EKG: Reviewed and X-Ray: CXR: X-Ray Chest 1 View (CXR): No results found for this visit on 05/20/20. and X-Ray Chest PA and Lateral (CXR): No results found for this visit on 05/20/20.

## 2020-05-25 NOTE — ASSESSMENT & PLAN NOTE
-Presents with acute decompensated combined CHF  -EF 20%  -Continue IV diuresis for additional day  -Continue BB, ACEi  -Add Imdur  -Strict I's/O's  -Ischemic evaluation prior to d/c    5/25/2020  -Clinically improved  -Continue BB, po Lasix, Imdur  -Hold ACEi in anticipation of R/LHC, postponed today due to possible DT's  -Will reassess in AM

## 2020-05-25 NOTE — PLAN OF CARE
POC reviewed with pt. Pt verbalizes understanding of POC. No questions at this time.  AAOx3. NADN.  NSR on cardiac monitor.  Heparin drip d/c'd.  Postponed LHC to tomorrow due to pt starting to shake. Started on Librium for possible DT's.  Pt remains free of falls.  No complaints at this time.  Safety measures in place. Will continue to monitor.  Informed pt to call for assistance before getting up. Pt verbalizes understanding.

## 2020-05-25 NOTE — ASSESSMENT & PLAN NOTE
05/25/2020  ASSESSMENT: Anxiety about health, exacerbated by smoking cessation and insomnia. [facility] Controlled Prescription Drug Monitoring database was queried and showed no activity to suggest abuse, diversion, or other inappropriate use of prescription medications.  PLAN:  Alprazolam PRN.

## 2020-05-25 NOTE — PROGRESS NOTES
[facility] -   Cardiology  Progress Note    Patient Name: Ceasar Hernandez  MRN: [id number]  Admission Date: 5/20/2020  Hospital Length of Stay: 5 days  Code Status: Full Code   Attending Physician: Timothy Hunter MD   Primary Care Physician: Provider Notinsystem  Expected Discharge Date:   Principal Problem:Acute combined systolic and diastolic CHF, NYHA class 4    Subjective:   HPI:  Pt presents with acute respiratory failure.  Per ER chart, suspicious for Covid 19.  Cardiology consult performed based on communication from ER physician and chart review.  He has h/o COPD.  Pt presented to ER with c/o dyspnea since yesterday with associated wheezing.  Per chart no cp sxs.  ECG in ER showed possible SVT.  Adenosine administered and f/u ecgs looked more like sinus tachycardia.  BP very high on arrival 217/109.  Given Labetalol in ER.    BNP 1194  Troponin 0.09  Lactic acid +  ecg with inferolateral st-t abnl.  No old records to compare/review.    Hospital Course:   5/21/20:  PT SEEN AND EXAMINED IN ICU.  HE FELT REMARKABLY IMPROVED, ON NASAL CANNULA.  DENIES CHEST PAIN SXS.  DYSPNEA MUCH IMPROVED.  TROPONIN PEAK NEAR 2.6  BP HAS BEEN VERY HIGH. LACTIC ACID NORMAL NOW.  CREATININE STABLE.    5/22/2020-Patient seen and examined today, resting in bed. Feeling much better, SOB greatly improved. Some mild chest pressure, overnight, has since resolved. Labs reviewed. Troponin> 6 this AM, will repeat later today. Creatinine 1.9. BNP improved but still elevated.     5/23/20:  PT SEEN AND EXAMINED THIS AM.  NO CP SXS.  STILL WITH SOME DYSPNEA BUT MUCH IMPROVED.  LABS STABLE OVERALL. CREATININE BUMPED UP SLIGHTLY.  TROPONIN TRENDING DOWN.  BP MUCH IMPROVED.  ALSO DRINKS 1 PINT ETOH EVERY FEW DAYS.     5/24/20:  PT SEEN AND EXAMINED THIS AM. FELT BETTER.  NO RECURRENT CP.  DYSPNEA IMPROVED.  CREATININE STABLE, 1.9.  BNP MUCH IMPROVED.  BP REMAINS WELL CONTROLLED NOW.  + ABNL VASC STUDIES INFRAPOPLITEAL  "DISEASE.    5/25/2020-Patient seen and examined, lying in bed. Feels ok. Complains of having the "shakes". No chest pain or SOB. Labs reviewed. Creatinine stable at 2.0. L/RHC postponed due to possible DT's, discussed with hospital medicine.        Review of Systems   Constitution: Positive for malaise/fatigue.   HENT: Negative.    Eyes: Negative.    Cardiovascular: Negative.    Respiratory: Negative.    Endocrine: Negative.    Hematologic/Lymphatic: Negative.    Skin: Negative.    Musculoskeletal: Negative.    Gastrointestinal: Negative.    Genitourinary: Negative.    Neurological: Negative.    Psychiatric/Behavioral: Negative.    Allergic/Immunologic: Negative.      Objective:     Vital Signs (Most Recent):  Temp: 98.4 °F (36.9 °C) (05/25/20 1153)  Pulse: 94 (05/25/20 1153)  Resp: 18 (05/25/20 1153)  BP: 106/68 (05/25/20 1153)  SpO2: 97 % (05/25/20 1153) Vital Signs (24h Range):  Temp:  [97.3 °F (36.3 °C)-98.6 °F (37 °C)] 98.4 °F (36.9 °C)  Pulse:  [] 94  Resp:  [16-18] 18  SpO2:  [96 %-99 %] 97 %  BP: ()/(61-82) 106/68     Weight: 88.7 kg (195 lb 8.8 oz)  Body mass index is 28.88 kg/m².     SpO2: 97 %  O2 Device (Oxygen Therapy): (S) room air(rt placed pt on ra)      Intake/Output Summary (Last 24 hours) at 5/25/2020 1205  Last data filed at 5/25/2020 0500  Gross per 24 hour   Intake 1589.83 ml   Output 1250 ml   Net 339.83 ml       Lines/Drains/Airways     Peripheral Intravenous Line                 Peripheral IV - Single Lumen 05/20/20 2240 20 G Left Hand 4 days                Physical Exam   Constitutional: He is oriented to person, place, and time. He appears well-developed and well-nourished. No distress.   HENT:   Head: Normocephalic and atraumatic.   Eyes: Pupils are equal, round, and reactive to light. Right eye exhibits no discharge. Left eye exhibits no discharge.   Neck: Neck supple. No JVD present.   Cardiovascular: Normal rate, regular rhythm, S1 normal, S2 normal and normal heart sounds. "   No murmur heard.  Pulmonary/Chest: Effort normal and breath sounds normal. No respiratory distress. He has no wheezes. He has no rales.   Abdominal: Soft. He exhibits no distension.   Musculoskeletal: He exhibits no edema.   Neurological: He is alert and oriented to person, place, and time.   Skin: Skin is warm and dry. He is not diaphoretic. No erythema.   Psychiatric: He has a normal mood and affect. His behavior is normal. Thought content normal.   Nursing note and vitals reviewed.      Significant Labs:   CMP   Recent Labs   Lab 05/24/20 0519 05/25/20 0427     140 140  139   K 4.2  4.1 3.8  3.8     104 104  102   CO2 26  25 24  25     99 106  108   BUN 45*  43* 41*  36*   CREATININE 1.9*  1.9* 2.0*  2.0*   CALCIUM 9.8  9.8 9.3  9.4   PROT 7.2 7.0   ALBUMIN 3.3*  3.2* 3.0*  3.0*   BILITOT 0.4 0.4   ALKPHOS 130 111   AST 13 8*   ALT 16 13   ANIONGAP 9  11 12  12   ESTGFRAFRICA 43*  43* 40*  40*   EGFRNONAA 37*  37* 35*  35*   , CBC   Recent Labs   Lab 05/24/20 0519 05/25/20 0428   WBC 10.89 11.46   HGB 14.6 13.4*   HCT 45.1 41.4    165   , Troponin   Recent Labs   Lab 05/24/20 0519   TROPONINI 4.111*    and All pertinent lab results from the last 24 hours have been reviewed.    Significant Imaging: Echocardiogram: 2D echo with color flow doppler: No results found for this or any previous visit., EKG: Reviewed and X-Ray: CXR: X-Ray Chest 1 View (CXR): No results found for this visit on 05/20/20. and X-Ray Chest PA and Lateral (CXR): No results found for this visit on 05/20/20.    Assessment and Plan:   Patient who presents with decompensated CHF/NSTEMI. Clinically improved. L/RHC postponed today due to possible DT's. Reassess in AM. Keep NPO after MN.    * Acute combined systolic and diastolic CHF, NYHA class 4  -Presents with acute decompensated combined CHF  -EF 20%  -Continue IV diuresis for additional day  -Continue BB, ACEi  -Add Imdur  -Strict  I's/O's  -Ischemic evaluation prior to d/c    5/25/2020  -Clinically improved  -Continue BB, po Lasix, Imdur  -Hold ACEi in anticipation of R/LHC, postponed today due to possible DT's  -Will reassess in AM    CRI (chronic renal insufficiency)  -Creatinine stable at 2.0      ETOH abuse  -Patient counseled on cessation of alcohol.  -Concerned about possible DT's, discussed with hospital medicine    PAD (peripheral artery disease)  -Continue ASA, Plavix, statin    Smoking history  -Smoking cessation      Lactic acidosis  -Normalized since admission    COPD (chronic obstructive pulmonary disease)  -Per hospital medicine, critical care mgt.    Essential hypertension  -BP improved  -Continue Coreg, ACEi  -Imdur 30 mg daily added  -Monitor    5/25/2020  -BP stable  -Continue Coreg, Imdur  -ACEi held in anticipation of R/LHC    NSTEMI (non-ST elevated myocardial infarction)  -Troponin > 6 this AM, repeat this afternoon  -Continue ASA, Plavix, BB, statin, ACEi  -Add Imdur 30 mg daily  -Continue heparin gtt for additional day  -EF 20%  -Will need ischemic evaluation with probable LHC pending clinical course    5/25/2020  -Remains chest pain free  -Continue ASA, Plavix, BB, statin  -Hold ACEi in anticipation of LHC  -Ok to stop heparin gtt  -NPO after MN  -Reassess in AM    Abnormal ECG  -Ischemic evaluation with LHC vs MPI stress test pending clinical course        VTE Risk Mitigation (From admission, onward)         Ordered     heparin 25,000 units in dextrose 5% 250 mL (100 units/mL) infusion LOW INTENSITY nomogram - [facility]  Continuous     Question:  Heparin Infusion Adjustment (DO NOT MODIFY ANSWER)  Answer:  [email or web address]\epic\Images\Pharmacy\HeparinInfusions\heparin LOW INTENSITY nomogram for [facility] [id number]    05/20/20 2103     heparin 25,000 units in dextrose 5% (100 units/ml) IV bolus from bag - ADDITIONAL PRN BOLUS - 60 units/kg (max bolus 4000 units)  As needed (PRN)     Question:  Heparin Infusion Adjustment (DO NOT  MODIFY ANSWER)  Answer:  [email or web address]\epic\Images\Pharmacy\HeparinInfusions\heparin LOW INTENSITY nomogram for [facility] [id number].pdf    05/20/20 2103     heparin 25,000 units in dextrose 5% (100 units/ml) IV bolus from bag - ADDITIONAL PRN BOLUS - 30 units/kg (max bolus 4000 units)  As needed (PRN)     Question:  Heparin Infusion Adjustment (DO NOT MODIFY ANSWER)  Answer:  [email or web address]\epic\Images\Pharmacy\HeparinInfusions\heparin LOW INTENSITY nomogram for [facility] [id number]    05/20/20 2103     IP VTE HIGH RISK PATIENT  Once      05/20/20 2058     Place sequential compression device  Until discontinued      05/20/20 2058                Renata Chowdhury PA-C  Cardiology  [facility]

## 2020-05-25 NOTE — ASSESSMENT & PLAN NOTE
05/25/2020  ASSESSMENT: No infectious source. Could be reaction to corticosteroids or stress.   PLAN:  Monitor.    Recent Labs   Lab 05/20/20  0811 05/20/20  2240 05/21/20  0355 05/22/20  0707 05/23/20  0524 05/24/20  0519 05/25/20  0428   WBC 13.83* 13.80* 16.00* 13.65* 11.01 10.89 11.46     Microbiology Results (last 7 days)     Procedure Component Value Units Date/Time    Blood culture x two cultures. Draw prior to antibiotics. [[id number]] Collected:  05/20/20 0814    Order Status:  Completed Specimen:  Blood from Peripheral, Antecubital, Right Updated:  05/24/20 1612     Blood Culture, Routine No Growth to date      No Growth to date      No Growth to date      No Growth to date      No Growth to date    Narrative:       Aerobic and anaerobic    Blood culture x two cultures. Draw prior to antibiotics. [[id number]] Collected:  05/20/20 0811    Order Status:  Completed Specimen:  Blood from Peripheral, Antecubital, Left Updated:  05/24/20 1612     Blood Culture, Routine No Growth to date      No Growth to date      No Growth to date      No Growth to date      No Growth to date    Narrative:       Aerobic and anaerobic         Antibiotics (From admission, onward)    None

## 2020-05-25 NOTE — ASSESSMENT & PLAN NOTE
Currently Euvolemic. IV lasix changed to PO lasix .   5/25- Lasix on hold and started on gentle hydration for possible LHC.

## 2020-05-25 NOTE — ASSESSMENT & PLAN NOTE
-BP improved  -Continue Coreg, ACEi  -Imdur 30 mg daily added  -Monitor    5/25/2020  -BP stable  -Continue Coreg, Imdur  -ACEi held in anticipation of R/LHC

## 2020-05-25 NOTE — ASSESSMENT & PLAN NOTE
05/25/2020  ASSESSMENT: Controlled.   PLAN:  Continue present treatment plan.  Temp:  [97.3 °F (36.3 °C)-98.6 °F (37 °C)] 98.4 °F (36.9 °C)  Pulse:  [] 94  Resp:  [16-18] 18  SpO2:  [96 %-99 %] 97 %  BP: ()/(67-82) 106/68

## 2020-05-25 NOTE — ASSESSMENT & PLAN NOTE
- Pt denies dependency and any h/o withdrawal in the past   -Last drink on 5/20/20   -Stat Librium

## 2020-05-25 NOTE — ASSESSMENT & PLAN NOTE
05/25/2020  ASSESSMENT: He is currently at the action (quitting) stage of smoking cessation. Smoking cessation encouraged.    PLAN:  Continue nicotine patch. Referral to tobacco cessation program after discharge.

## 2020-05-25 NOTE — PROGRESS NOTES
[facility] Hospital Medicine  Progress Note    Patient Name: Ceasar Hernandez  MRN: [id number]  Patient Class: IP- Inpatient   Admission Date: 5/20/2020  Length of Stay: 5 days  Attending Physician: Timothy Hunter MD  Primary Care Provider: Provider Notinsystem        Subjective:     Principal Problem:NSTEMI (non-ST elevated myocardial infarction)        HPI:  Pt is a 61 yo male with PMhx of COPD and HTN who was admitted to ICU in early AM of 5/21/2020 for Acute Respiratory Failure requiring BIPAP and decompensated combined heart failure. Pt reported WINSTON and lightheadedness. COVID 19 negative. Troponins were elevated 1.180 > 2.686 > 2.573 and Cardiology recommended heparin infusion. Pt was in acute CHF exacerbation and 2 d ECHO showed LVEF 20 % and diastolic dysfunction. Diuresis with IV lasix given. Initially, pt required supplemental oxygen via NIPPV which has thus been weaned. Also, pt received Plavix, BB, ACE inhibitor, ASA and STATIN. Pt's respiratory status improved and he was downgraded to Telemetry unit.     Overview/Hospital Course:  05/22/2020 Admitted 05/20/2020 to ICU for NSTEMI and Acute Combined Systolic and Diastolic heart failure. ECHO showed EF 20% and severe diastolic dysfunction. He stabilized and was transferred out of ICU to telemetry. Cardiology following. CV Meds include carvedilol, lisinopril, furosemide, isosorbide mononitrate, atorvastatin, and clopidogrel, aspirin. Symptoms MUCH improved. No present chest pain. Breathing much more comfortably. Good diuresis. Anticipating cardiac catheterization when more stable, probably Monday.    5/25/20- [facility] postponed by Cardiology this  morning due to concern over tremors  or shakes . Last drink was 5 days ago. Pt denies any H/O delirium tremens or alcohol withdrawal  in the past , states he can go days without drinking. States he was shaking due to room being too cold. Vitals are stable . Pt is awake , alert and oriented x 3 and  situation. Labs resulted Na 140, K 3.8, BUN 36, Creatinine 2.0.     Interval History:   [facility] postponed by Cardiology this  morning due to concern over tremors  or shakes     Review of Systems   Constitutional: Negative for activity change, appetite change and fever.   HENT: Negative for sore throat.    Eyes: Negative for visual disturbance.   Respiratory: Negative for cough, chest tightness and shortness of breath.    Cardiovascular: Negative for chest pain, palpitations and leg swelling.   Gastrointestinal: Negative for abdominal distention, abdominal pain, constipation, diarrhea, nausea and vomiting.   Endocrine: Negative for polyuria.   Genitourinary: Negative for decreased urine volume, dysuria, flank pain, frequency and hematuria.   Musculoskeletal: Negative for back pain and gait problem.   Skin: Negative for rash.   Neurological: Positive for tremors. Negative for syncope, speech difficulty, weakness, light-headedness and headaches.   Psychiatric/Behavioral: Negative for confusion, hallucinations and sleep disturbance.     Objective:     Vital Signs (Most Recent):  Temp: 98.4 °F (36.9 °C) (05/25/20 1153)  Pulse: 94 (05/25/20 1153)  Resp: 18 (05/25/20 1153)  BP: 106/68 (05/25/20 1153)  SpO2: 97 % (05/25/20 1153) Vital Signs (24h Range):  Temp:  [97.3 °F (36.3 °C)-98.6 °F (37 °C)] 98.4 °F (36.9 °C)  Pulse:  [] 94  Resp:  [16-18] 18  SpO2:  [96 %-99 %] 97 %  BP: ()/(61-82) 106/68     Weight: 88.7 kg (195 lb 8.8 oz)  Body mass index is 28.88 kg/m².    Intake/Output Summary (Last 24 hours) at 5/25/2020 1211  Last data filed at 5/25/2020 0500  Gross per 24 hour   Intake 1589.83 ml   Output 1250 ml   Net 339.83 ml      Physical Exam   Constitutional: He is oriented to person, place, and time. He appears well-developed and well-nourished. No distress.   HENT:   Head: Normocephalic and atraumatic.   Mouth/Throat: No oropharyngeal exudate.   Eyes: Pupils are equal, round, and reactive to light. Conjunctivae  and EOM are normal.   Neck: Normal range of motion. Neck supple. No JVD present. No thyromegaly present.   Cardiovascular: Normal rate, regular rhythm and normal heart sounds.   No murmur heard.  Pulmonary/Chest: Effort normal and breath sounds normal. No respiratory distress. He has no wheezes. He has no rales. He exhibits no tenderness.   Abdominal: Soft. Bowel sounds are normal. He exhibits no distension. There is no tenderness. There is no rebound and no guarding.   Musculoskeletal: Normal range of motion. He exhibits no edema.   Lymphadenopathy:     He has no cervical adenopathy.   Neurological: He is alert and oriented to person, place, and time. He displays normal reflexes. No cranial nerve deficit or sensory deficit.   Skin: Skin is warm and dry. No rash noted. He is not diaphoretic.   Psychiatric: He has a normal mood and affect.       Significant Labs:   CBC:   Recent Labs   Lab 05/24/20 0519 05/25/20  0428   WBC 10.89 11.46   HGB 14.6 13.4*   HCT 45.1 41.4    165     CMP:   Recent Labs   Lab 05/24/20 0519 05/25/20  0427     140 140  139   K 4.2  4.1 3.8  3.8     104 104  102   CO2 26  25 24  25     99 106  108   BUN 45*  43* 41*  36*   CREATININE 1.9*  1.9* 2.0*  2.0*   CALCIUM 9.8  9.8 9.3  9.4   PROT 7.2 7.0   ALBUMIN 3.3*  3.2* 3.0*  3.0*   BILITOT 0.4 0.4   ALKPHOS 130 111   AST 13 8*   ALT 16 13   ANIONGAP 9  11 12  12   EGFRNONAA 37*  37* 35*  35*       Significant Imaging:       Assessment/Plan:      * NSTEMI (non-ST elevated myocardial infarction)  5/24-On aspirin, plavix  Coreg,lipitor, heparin drip. Flora score 130. Cards planning LHC. Hold ACEI due to worsening kidney functions   5/25- LHC postponed by Cardiology due to concern over tremor . Will start Librium     Acute combined systolic and diastolic CHF, NYHA class 4  Currently Euvolemic. IV lasix changed to PO lasix .   5/25- Lasix on hold and started on gentle hydration for possible LHC.      Respiratory distress, acute  05/25/2020 RESOLVED.    CRI (chronic renal insufficiency)  - Baseline creatinine is unknown .   - Stable creatinine since admission at 1.9 to 2.1       ETOH abuse  - Pt denies dependency and any h/o withdrawal in the past   -Last drink on 5/20/20   -Stat Librium       COPD (chronic obstructive pulmonary disease)  05/25/2020  ASSESSMENT: Chronic condition. Maintaining SpO2 on room air.   PLAN:  Continue present treatment plan.        Panlobular emphysema  - Stable   -Bronchodilator inhalational treatment as needed       PAD (peripheral artery disease)        Anxiety  05/25/2020  ASSESSMENT: Anxiety about health, exacerbated by smoking cessation and insomnia. [facility] Controlled Prescription Drug Monitoring database was queried and showed no activity to suggest abuse, diversion, or other inappropriate use of prescription medications.  PLAN:  Alprazolam PRN.        Leukocytosis  05/25/2020  ASSESSMENT: No infectious source. Could be reaction to corticosteroids or stress.   PLAN:  Monitor.    Recent Labs   Lab 05/20/20  0811 05/20/20  2240 05/21/20  0355 05/22/20  0707 05/23/20  0524 05/24/20  0519 05/25/20  0428   WBC 13.83* 13.80* 16.00* 13.65* 11.01 10.89 11.46     Microbiology Results (last 7 days)     Procedure Component Value Units Date/Time    Blood culture x two cultures. Draw prior to antibiotics. [[id number]] Collected:  05/20/20 0814    Order Status:  Completed Specimen:  Blood from Peripheral, Antecubital, Right Updated:  05/24/20 1612     Blood Culture, Routine No Growth to date      No Growth to date      No Growth to date      No Growth to date      No Growth to date    Narrative:       Aerobic and anaerobic    Blood culture x two cultures. Draw prior to antibiotics. [[id number]] Collected:  05/20/20 0811    Order Status:  Completed Specimen:  Blood from Peripheral, Antecubital, Left Updated:  05/24/20 1612     Blood Culture, Routine No Growth to date       No Growth to date      No Growth to date      No Growth to date      No Growth to date    Narrative:       Aerobic and anaerobic         Antibiotics (From admission, onward)    None           Smoking history  05/25/2020  ASSESSMENT: He is currently at the action (quitting) stage of smoking cessation. Smoking cessation encouraged.    PLAN:  Continue nicotine patch. Referral to tobacco cessation program after discharge.      Lactic acidosis  05/25/2020  ASSESSMENT: RESOLVED.  Recent Labs   Lab 05/20/20  0811 05/20/20  2240 05/21/20  1151   LACTATE 6.3* 2.3* 1.8        Essential hypertension  05/25/2020  ASSESSMENT: Controlled.   PLAN:  Continue present treatment plan.  Temp:  [97.3 °F (36.3 °C)-98.6 °F (37 °C)] 98.4 °F (36.9 °C)  Pulse:  [] 94  Resp:  [16-18] 18  SpO2:  [96 %-99 %] 97 %  BP: ()/(67-82) 106/68        Abnormal ECG          VTE Risk Mitigation (From admission, onward)         Ordered     IP VTE HIGH RISK PATIENT  Once      05/20/20 2058     Place sequential compression device  Until discontinued      05/20/20 2058                      Timothy Hunter MD  Department of Hospital Medicine   [facility]

## 2020-05-25 NOTE — ASSESSMENT & PLAN NOTE
05/25/2020  ASSESSMENT: RESOLVED.  Recent Labs   Lab 05/20/20  0811 05/20/20  2240 05/21/20  1151   LACTATE 6.3* 2.3* 1.8

## 2020-05-25 NOTE — SUBJECTIVE & OBJECTIVE
Interval History:   [facility] postponed by Cardiology this  morning due to concern over tremors  or shakes     Review of Systems   Constitutional: Negative for activity change, appetite change and fever.   HENT: Negative for sore throat.    Eyes: Negative for visual disturbance.   Respiratory: Negative for cough, chest tightness and shortness of breath.    Cardiovascular: Negative for chest pain, palpitations and leg swelling.   Gastrointestinal: Negative for abdominal distention, abdominal pain, constipation, diarrhea, nausea and vomiting.   Endocrine: Negative for polyuria.   Genitourinary: Negative for decreased urine volume, dysuria, flank pain, frequency and hematuria.   Musculoskeletal: Negative for back pain and gait problem.   Skin: Negative for rash.   Neurological: Positive for tremors. Negative for syncope, speech difficulty, weakness, light-headedness and headaches.   Psychiatric/Behavioral: Negative for confusion, hallucinations and sleep disturbance.     Objective:     Vital Signs (Most Recent):  Temp: 98.4 °F (36.9 °C) (05/25/20 1153)  Pulse: 94 (05/25/20 1153)  Resp: 18 (05/25/20 1153)  BP: 106/68 (05/25/20 1153)  SpO2: 97 % (05/25/20 1153) Vital Signs (24h Range):  Temp:  [97.3 °F (36.3 °C)-98.6 °F (37 °C)] 98.4 °F (36.9 °C)  Pulse:  [] 94  Resp:  [16-18] 18  SpO2:  [96 %-99 %] 97 %  BP: ()/(61-82) 106/68     Weight: 88.7 kg (195 lb 8.8 oz)  Body mass index is 28.88 kg/m².    Intake/Output Summary (Last 24 hours) at 5/25/2020 1211  Last data filed at 5/25/2020 0500  Gross per 24 hour   Intake 1589.83 ml   Output 1250 ml   Net 339.83 ml      Physical Exam   Constitutional: He is oriented to person, place, and time. He appears well-developed and well-nourished. No distress.   HENT:   Head: Normocephalic and atraumatic.   Mouth/Throat: No oropharyngeal exudate.   Eyes: Pupils are equal, round, and reactive to light. Conjunctivae and EOM are normal.   Neck: Normal range of motion. Neck supple. No  JVD present. No thyromegaly present.   Cardiovascular: Normal rate, regular rhythm and normal heart sounds.   No murmur heard.  Pulmonary/Chest: Effort normal and breath sounds normal. No respiratory distress. He has no wheezes. He has no rales. He exhibits no tenderness.   Abdominal: Soft. Bowel sounds are normal. He exhibits no distension. There is no tenderness. There is no rebound and no guarding.   Musculoskeletal: Normal range of motion. He exhibits no edema.   Lymphadenopathy:     He has no cervical adenopathy.   Neurological: He is alert and oriented to person, place, and time. He displays normal reflexes. No cranial nerve deficit or sensory deficit.   Skin: Skin is warm and dry. No rash noted. He is not diaphoretic.   Psychiatric: He has a normal mood and affect.       Significant Labs:   CBC:   Recent Labs   Lab 05/24/20 0519 05/25/20  0428   WBC 10.89 11.46   HGB 14.6 13.4*   HCT 45.1 41.4    165     CMP:   Recent Labs   Lab 05/24/20 0519 05/25/20  0427     140 140  139   K 4.2  4.1 3.8  3.8     104 104  102   CO2 26  25 24  25     99 106  108   BUN 45*  43* 41*  36*   CREATININE 1.9*  1.9* 2.0*  2.0*   CALCIUM 9.8  9.8 9.3  9.4   PROT 7.2 7.0   ALBUMIN 3.3*  3.2* 3.0*  3.0*   BILITOT 0.4 0.4   ALKPHOS 130 111   AST 13 8*   ALT 16 13   ANIONGAP 9  11 12  12   EGFRNONAA 37*  37* 35*  35*       Significant Imaging:

## 2020-05-25 NOTE — ASSESSMENT & PLAN NOTE
-Troponin > 6 this AM, repeat this afternoon  -Continue ASA, Plavix, BB, statin, ACEi  -Add Imdur 30 mg daily  -Continue heparin gtt for additional day  -EF 20%  -Will need ischemic evaluation with probable LHC pending clinical course    5/25/2020  -Remains chest pain free  -Continue ASA, Plavix, BB, statin  -Hold ACEi in anticipation of LHC  -Ok to stop heparin gtt  -NPO after MN  -Reassess in AM

## 2020-05-25 NOTE — ASSESSMENT & PLAN NOTE
5/24-On aspirin, plavix  Coreg,lipitor, heparin drip. Flora score 130. Cards planning LHC. Hold ACEI due to worsening kidney functions   5/25- LHC postponed by Cardiology due to concern over tremor . Will start Librium

## 2020-05-25 NOTE — ASSESSMENT & PLAN NOTE
05/25/2020  ASSESSMENT: Chronic condition. Maintaining SpO2 on room air.   PLAN:  Continue present treatment plan.

## 2020-05-26 LAB
ALBUMIN SERPL BCP-MCNC: 2.7 G/DL (ref 3.5–5.2)
ANION GAP SERPL CALC-SCNC: 13 MMOL/L (ref 8–16)
BUN SERPL-MCNC: 31 MG/DL (ref 8–23)
CALCIUM SERPL-MCNC: 9 MG/DL (ref 8.7–10.5)
CATH EF QUANTITATIVE: 20 %
CHLORIDE SERPL-SCNC: 104 MMOL/L (ref 95–110)
CO2 SERPL-SCNC: 21 MMOL/L (ref 23–29)
CREAT SERPL-MCNC: 1.6 MG/DL (ref 0.5–1.4)
EST. GFR  (AFRICAN AMERICAN): 53 ML/MIN/1.73 M^2
EST. GFR  (NON AFRICAN AMERICAN): 46 ML/MIN/1.73 M^2
GLUCOSE SERPL-MCNC: 107 MG/DL (ref 70–110)
MAGNESIUM SERPL-MCNC: 2.2 MG/DL (ref 1.6–2.6)
PHOSPHATE SERPL-MCNC: 2.6 MG/DL (ref 2.7–4.5)
PHOSPHATE SERPL-MCNC: 2.6 MG/DL (ref 2.7–4.5)
POTASSIUM SERPL-SCNC: 3.8 MMOL/L (ref 3.5–5.1)
SODIUM SERPL-SCNC: 138 MMOL/L (ref 136–145)

## 2020-05-26 PROCEDURE — 94761 N-INVAS EAR/PLS OXIMETRY MLT: CPT

## 2020-05-26 PROCEDURE — 25000003 PHARM REV CODE 250: Performed by: INTERNAL MEDICINE

## 2020-05-26 PROCEDURE — S4991 NICOTINE PATCH NONLEGEND: HCPCS | Performed by: INTERNAL MEDICINE

## 2020-05-26 PROCEDURE — 25000003 PHARM REV CODE 250: Performed by: PHYSICIAN ASSISTANT

## 2020-05-26 PROCEDURE — 27201423 OPTIME MED/SURG SUP & DEVICES STERILE SUPPLY: Performed by: INTERNAL MEDICINE

## 2020-05-26 PROCEDURE — C1894 INTRO/SHEATH, NON-LASER: HCPCS | Performed by: INTERNAL MEDICINE

## 2020-05-26 PROCEDURE — 99152 PR MOD CONSCIOUS SEDATION, SAME PHYS, 5+ YRS, FIRST 15 MIN: ICD-10-PCS | Mod: ,,, | Performed by: INTERNAL MEDICINE

## 2020-05-26 PROCEDURE — 75630 PR  ANGIO AORTOBIFEMORAL W CATH: ICD-10-PCS | Mod: 26,59,, | Performed by: INTERNAL MEDICINE

## 2020-05-26 PROCEDURE — 93460 R&L HRT ART/VENTRICLE ANGIO: CPT | Mod: 26,,, | Performed by: INTERNAL MEDICINE

## 2020-05-26 PROCEDURE — 93460 R&L HRT ART/VENTRICLE ANGIO: CPT | Performed by: INTERNAL MEDICINE

## 2020-05-26 PROCEDURE — 21400001 HC TELEMETRY ROOM

## 2020-05-26 PROCEDURE — 99153 MOD SED SAME PHYS/QHP EA: CPT | Performed by: INTERNAL MEDICINE

## 2020-05-26 PROCEDURE — 99232 PR SUBSEQUENT HOSPITAL CARE,LEVL II: ICD-10-PCS | Mod: ,,, | Performed by: INTERNAL MEDICINE

## 2020-05-26 PROCEDURE — 75630 X-RAY AORTA LEG ARTERIES: CPT | Mod: 26,59,, | Performed by: INTERNAL MEDICINE

## 2020-05-26 PROCEDURE — C1751 CATH, INF, PER/CENT/MIDLINE: HCPCS | Performed by: INTERNAL MEDICINE

## 2020-05-26 PROCEDURE — 99900035 HC TECH TIME PER 15 MIN (STAT)

## 2020-05-26 PROCEDURE — 83735 ASSAY OF MAGNESIUM: CPT

## 2020-05-26 PROCEDURE — 99232 SBSQ HOSP IP/OBS MODERATE 35: CPT | Mod: ,,, | Performed by: INTERNAL MEDICINE

## 2020-05-26 PROCEDURE — 75630 X-RAY AORTA LEG ARTERIES: CPT | Mod: 59 | Performed by: INTERNAL MEDICINE

## 2020-05-26 PROCEDURE — 27000190 HC CPAP FULL FACE MASK W/VALVE

## 2020-05-26 PROCEDURE — 36415 COLL VENOUS BLD VENIPUNCTURE: CPT

## 2020-05-26 PROCEDURE — 93460 PR CATH PLACE/CORON ANGIO, IMG SUPER/INTERP,R&L HRT CATH, L HRT VENTRIC: ICD-10-PCS | Mod: 26,,, | Performed by: INTERNAL MEDICINE

## 2020-05-26 PROCEDURE — C1769 GUIDE WIRE: HCPCS | Performed by: INTERNAL MEDICINE

## 2020-05-26 PROCEDURE — 63600175 PHARM REV CODE 636 W HCPCS: Performed by: INTERNAL MEDICINE

## 2020-05-26 PROCEDURE — 80069 RENAL FUNCTION PANEL: CPT

## 2020-05-26 PROCEDURE — 25500020 PHARM REV CODE 255: Performed by: INTERNAL MEDICINE

## 2020-05-26 PROCEDURE — 99152 MOD SED SAME PHYS/QHP 5/>YRS: CPT | Performed by: INTERNAL MEDICINE

## 2020-05-26 PROCEDURE — 99231 PR SUBSEQUENT HOSPITAL CARE,LEVL I: ICD-10-PCS | Mod: ,,, | Performed by: INTERNAL MEDICINE

## 2020-05-26 PROCEDURE — 99152 MOD SED SAME PHYS/QHP 5/>YRS: CPT | Mod: ,,, | Performed by: INTERNAL MEDICINE

## 2020-05-26 PROCEDURE — 99231 SBSQ HOSP IP/OBS SF/LOW 25: CPT | Mod: ,,, | Performed by: INTERNAL MEDICINE

## 2020-05-26 PROCEDURE — 25000003 PHARM REV CODE 250: Performed by: FAMILY MEDICINE

## 2020-05-26 RX ORDER — ASPIRIN 325 MG
325 TABLET ORAL DAILY
Status: DISCONTINUED | OUTPATIENT
Start: 2020-05-27 | End: 2020-05-26

## 2020-05-26 RX ORDER — HEPARIN SODIUM 1000 [USP'U]/ML
INJECTION INTRAVENOUS; SUBCUTANEOUS
Status: DISCONTINUED | OUTPATIENT
Start: 2020-05-26 | End: 2020-05-26

## 2020-05-26 RX ORDER — NITROGLYCERIN 5 MG/ML
INJECTION, SOLUTION INTRAVENOUS
Status: DISCONTINUED | OUTPATIENT
Start: 2020-05-26 | End: 2020-05-26

## 2020-05-26 RX ORDER — SODIUM CHLORIDE 9 MG/ML
INJECTION, SOLUTION INTRAVENOUS CONTINUOUS
Status: ACTIVE | OUTPATIENT
Start: 2020-05-26 | End: 2020-05-27

## 2020-05-26 RX ORDER — MIDAZOLAM HYDROCHLORIDE 1 MG/ML
INJECTION, SOLUTION INTRAMUSCULAR; INTRAVENOUS
Status: DISCONTINUED | OUTPATIENT
Start: 2020-05-26 | End: 2020-05-26

## 2020-05-26 RX ORDER — FENTANYL CITRATE 50 UG/ML
INJECTION, SOLUTION INTRAMUSCULAR; INTRAVENOUS
Status: DISCONTINUED | OUTPATIENT
Start: 2020-05-26 | End: 2020-05-26

## 2020-05-26 RX ORDER — ASPIRIN 325 MG
325 TABLET ORAL DAILY
Status: DISCONTINUED | OUTPATIENT
Start: 2020-05-26 | End: 2020-05-27

## 2020-05-26 RX ORDER — DIPHENHYDRAMINE HYDROCHLORIDE 50 MG/ML
INJECTION INTRAMUSCULAR; INTRAVENOUS
Status: DISCONTINUED | OUTPATIENT
Start: 2020-05-26 | End: 2020-05-26

## 2020-05-26 RX ORDER — VERAPAMIL HYDROCHLORIDE 2.5 MG/ML
INJECTION, SOLUTION INTRAVENOUS
Status: DISCONTINUED | OUTPATIENT
Start: 2020-05-26 | End: 2020-05-26

## 2020-05-26 RX ORDER — LIDOCAINE HYDROCHLORIDE 20 MG/ML
INJECTION, SOLUTION EPIDURAL; INFILTRATION; INTRACAUDAL; PERINEURAL
Status: DISCONTINUED | OUTPATIENT
Start: 2020-05-26 | End: 2020-05-26

## 2020-05-26 RX ADMIN — Medication 1 PATCH: at 09:05

## 2020-05-26 RX ADMIN — ASPIRIN 81 MG: 81 TABLET, COATED ORAL at 09:05

## 2020-05-26 RX ADMIN — CARVEDILOL 3.12 MG: 3.12 TABLET, FILM COATED ORAL at 09:05

## 2020-05-26 RX ADMIN — SODIUM CHLORIDE: 0.9 INJECTION, SOLUTION INTRAVENOUS at 09:05

## 2020-05-26 RX ADMIN — ATORVASTATIN CALCIUM 40 MG: 40 TABLET, FILM COATED ORAL at 09:05

## 2020-05-26 RX ADMIN — CLOPIDOGREL BISULFATE 75 MG: 75 TABLET ORAL at 09:05

## 2020-05-26 RX ADMIN — ASPIRIN 325 MG: 325 TABLET ORAL at 09:05

## 2020-05-26 RX ADMIN — ALPRAZOLAM 0.5 MG: 0.5 TABLET ORAL at 09:05

## 2020-05-26 RX ADMIN — CHLORDIAZEPOXIDE HYDROCHLORIDE 10 MG: 10 CAPSULE ORAL at 09:05

## 2020-05-26 RX ADMIN — ISOSORBIDE MONONITRATE 30 MG: 30 TABLET, EXTENDED RELEASE ORAL at 09:05

## 2020-05-26 RX ADMIN — SODIUM CHLORIDE: 0.9 INJECTION, SOLUTION INTRAVENOUS at 06:05

## 2020-05-26 RX ADMIN — SODIUM CHLORIDE: 0.9 INJECTION, SOLUTION INTRAVENOUS at 12:05

## 2020-05-26 RX ADMIN — SODIUM PHOSPHATE, MONOBASIC, MONOHYDRATE 39.99 MMOL: 276; 142 INJECTION, SOLUTION INTRAVENOUS at 10:05

## 2020-05-26 NOTE — INTERVAL H&P NOTE
The patient has been examined and the H&P has been reviewed:    I concur with the findings and no changes have occurred since H&P was written.   HAS CHF CARDIOMYOPATHY FOR /[facility]    Anesthesia/Surgery risks, benefits and alternative options discussed and understood by patient/family.  I have explained the risks, benefits , and alternatives of the procedure in detail.the patient voices understanding and all questions have been answered.the patient agrees to proceed as planned.          Active Hospital Problems    Diagnosis  POA    *NSTEMI (non-ST elevated myocardial infarction) [I21.4]  Yes    CRI (chronic renal insufficiency) [N18.9]  Yes    PAD (peripheral artery disease) [I73.9]  Yes    ETOH abuse [F10.10]  Yes    Anxiety [F41.9]  Yes    Panlobular emphysema [J43.1]  Yes    Smoking history [Z87.891]  Not Applicable    Respiratory distress, acute [R06.03]  Yes    Leukocytosis [D72.829]  Yes    Acute combined systolic and diastolic CHF, NYHA class 4 [I50.41]  Yes     Echocardiogram 05/21/2020  ·Severely decreased left ventricular systolic function. The estimated ejection fraction is 20%.  ·Grade III (severe) left ventricular diastolic dysfunction consistent with restrictive physiology.  ·Normal right ventricular systolic function.  ·Concentric left ventricular hypertrophy.      Essential hypertension [I10]  Yes    COPD (chronic obstructive pulmonary disease) [J44.9]  Yes    Lactic acidosis [E87.2]  Yes      Resolved Hospital Problems    Diagnosis Date Resolved POA    Respiratory failure, acute [J96.00] 05/21/2020 Yes

## 2020-05-26 NOTE — ASSESSMENT & PLAN NOTE
5/24-On aspirin, plavix  Coreg,lipitor, heparin drip. Flora score 130. Cards planning [facility]. Hold ACEI due to worsening kidney functions   5/25- LHC postponed by Cardiology due to concern over tremor . Will start Librium   5/26- C today . Further plan pending  [facility]

## 2020-05-26 NOTE — PLAN OF CARE
POC reviewed with Patient  AAO  NPO this shift for Heart Cath. Patent to procedure after 3pm  R leg blood clot- increased weakness and ability to stand.   Uses urinal   Fall risk- bed alarm, non skid socks, bed in low position  SR on cardiac monitor this shift.

## 2020-05-26 NOTE — PROGRESS NOTES
[facility] -   Cardiology  Progress Note    Patient Name: Ceasar Hernandez  MRN: [id number]  Admission Date: 5/20/2020  Hospital Length of Stay: 6 days  Code Status: Full Code   Attending Physician: Timothy Hunter MD   Primary Care Physician: Provider Notinsystem  Expected Discharge Date:   Principal Problem:NSTEMI (non-ST elevated myocardial infarction)    Subjective:   HPI:  Pt presents with acute respiratory failure.  Per ER chart, suspicious for Covid 19.  Cardiology consult performed based on communication from ER physician and chart review.  He has h/o COPD.  Pt presented to ER with c/o dyspnea since yesterday with associated wheezing.  Per chart no cp sxs.  ECG in ER showed possible SVT.  Adenosine administered and f/u ecgs looked more like sinus tachycardia.  BP very high on arrival 217/109.  Given Labetalol in ER.    BNP 1194  Troponin 0.09  Lactic acid +  ecg with inferolateral st-t abnl.  No old records to compare/review.    Hospital Course:   5/21/20:  PT SEEN AND EXAMINED IN ICU.  HE FELT REMARKABLY IMPROVED, ON NASAL CANNULA.  DENIES CHEST PAIN SXS.  DYSPNEA MUCH IMPROVED.  TROPONIN PEAK NEAR 2.6  BP HAS BEEN VERY HIGH. LACTIC ACID NORMAL NOW.  CREATININE STABLE.    5/22/2020-Patient seen and examined today, resting in bed. Feeling much better, SOB greatly improved. Some mild chest pressure, overnight, has since resolved. Labs reviewed. Troponin> 6 this AM, will repeat later today. Creatinine 1.9. BNP improved but still elevated.     5/23/20:  PT SEEN AND EXAMINED THIS AM.  NO CP SXS.  STILL WITH SOME DYSPNEA BUT MUCH IMPROVED.  LABS STABLE OVERALL. CREATININE BUMPED UP SLIGHTLY.  TROPONIN TRENDING DOWN.  BP MUCH IMPROVED.  ALSO DRINKS 1 PINT ETOH EVERY FEW DAYS.     5/24/20:  PT SEEN AND EXAMINED THIS AM. FELT BETTER.  NO RECURRENT CP.  DYSPNEA IMPROVED.  CREATININE STABLE, 1.9.  BNP MUCH IMPROVED.  BP REMAINS WELL CONTROLLED NOW.  + ABNL VASC STUDIES INFRAPOPLITEAL  "DISEASE.    5/25/2020-Patient seen and examined, lying in bed. Feels ok. Complains of having the "shakes". No chest pain or SOB. Labs reviewed. Creatinine stable at 2.0. L/RHC postponed due to possible DT's, discussed with hospital medicine.    5/26/2020-Patient seen and examined today, lying comfortably in bed. Feeling better. Shakes have resolved. No chest pain/SOB. Labs reviewed. Creatinine improved to 1.6. L/RHC today.        Review of Systems   Constitution: Negative.   HENT: Negative.    Eyes: Negative.    Cardiovascular: Negative.    Respiratory: Negative.    Endocrine: Negative.    Hematologic/Lymphatic: Negative.    Skin: Negative.    Musculoskeletal: Positive for arthritis and joint pain.   Gastrointestinal: Negative.    Genitourinary: Negative.    Psychiatric/Behavioral: Negative.    Allergic/Immunologic: Negative.      Objective:     Vital Signs (Most Recent):  Temp: 99.3 °F (37.4 °C) (05/26/20 1136)  Pulse: 94 (05/26/20 1136)  Resp: 18 (05/26/20 1136)  BP: 116/61 (05/26/20 1136)  SpO2: 95 % (05/26/20 1136) Vital Signs (24h Range):  Temp:  [97.2 °F (36.2 °C)-100 °F (37.8 °C)] 99.3 °F (37.4 °C)  Pulse:  [] 94  Resp:  [16-18] 18  SpO2:  [93 %-98 %] 95 %  BP: (106-182)/(58-82) 116/61     Weight: 88 kg (194 lb 0.1 oz)  Body mass index is 28.65 kg/m².     SpO2: 95 %  O2 Device (Oxygen Therapy): room air      Intake/Output Summary (Last 24 hours) at 5/26/2020 1249  Last data filed at 5/26/2020 0500  Gross per 24 hour   Intake 734.17 ml   Output 1575 ml   Net -840.83 ml       Lines/Drains/Airways     Peripheral Intravenous Line                 Peripheral IV - Single Lumen 05/20/20 2240 20 G Left Hand 5 days                Physical Exam   Constitutional: He is oriented to person, place, and time. He appears well-developed and well-nourished. No distress.   HENT:   Head: Normocephalic and atraumatic.   Eyes: Pupils are equal, round, and reactive to light. Right eye exhibits no discharge. Left eye exhibits " no discharge.   Neck: Neck supple. No JVD present.   Cardiovascular: Normal rate, regular rhythm, S1 normal, S2 normal and normal heart sounds.   No murmur heard.  Pulmonary/Chest: Effort normal and breath sounds normal. No respiratory distress. He has no wheezes. He has no rales.   Abdominal: Soft. He exhibits no distension.   Musculoskeletal: He exhibits no edema.   Neurological: He is alert and oriented to person, place, and time.   Skin: Skin is warm and dry. He is not diaphoretic. No erythema.   Psychiatric: He has a normal mood and affect. His behavior is normal. Thought content normal.   Nursing note and vitals reviewed.      Significant Labs:   CMP   Recent Labs   Lab 05/25/20 0427 05/26/20  0437     139 138   K 3.8  3.8 3.8     102 104   CO2 24  25 21*     108 107   BUN 41*  36* 31*   CREATININE 2.0*  2.0* 1.6*   CALCIUM 9.3  9.4 9.0   PROT 7.0  --    ALBUMIN 3.0*  3.0* 2.7*   BILITOT 0.4  --    ALKPHOS 111  --    AST 8*  --    ALT 13  --    ANIONGAP 12  12 13   ESTGFRAFRICA 40*  40* 53*   EGFRNONAA 35*  35* 46*   , CBC   Recent Labs   Lab 05/25/20 0428   WBC 11.46   HGB 13.4*   HCT 41.4      , Troponin No results for input(s): TROPONINI in the last 48 hours. and All pertinent lab results from the last 24 hours have been reviewed.    Significant Imaging: Echocardiogram: 2D echo with color flow doppler: No results found for this or any previous visit., EKG: Reviewed and X-Ray: CXR: X-Ray Chest 1 View (CXR): No results found for this visit on 05/20/20. and X-Ray Chest PA and Lateral (CXR): No results found for this visit on 05/20/20.    Assessment and Plan:   Patient who presents with NSTEM/acute CHF. Clinically improved. Continue OMT. L/RHC today, further rec's to follow.    * NSTEMI (non-ST elevated myocardial infarction)  -Troponin > 6 this AM, repeat this afternoon  -Continue ASA, Plavix, BB, statin, ACEi  -Add Imdur 30 mg daily  -Continue heparin gtt for additional  day  -EF 20%  -Will need ischemic evaluation with probable LHC pending clinical course    5/25/2020  -Remains chest pain free  -Continue ASA, Plavix, BB, statin  -Hold ACEi in anticipation of LHC  -Ok to stop heparin gtt  -NPO after MN  -Reassess in AM    5/26/2020  -Stable overnight  -Continue ASA, Plavix, BB, statin  -R/LHC today. All risks benefits, and treatment alternatives explained to patient in detail. All questions answered. He has agreed to proceed    CRI (chronic renal insufficiency)  -Creatinine stable at 1.6  -Continue to monitor      ETOH abuse  -Patient counseled on cessation of alcohol.  -Concerned about possible DT's, discussed with hospital medicine    PAD (peripheral artery disease)  -Continue ASA, Plavix, statin    Smoking history  -Smoking cessation      Lactic acidosis  -Normalized since admission    COPD (chronic obstructive pulmonary disease)  -Per hospital medicine, critical care mgt.    Essential hypertension  -BP improved  -Continue Coreg, ACEi  -Imdur 30 mg daily added  -Monitor    5/25/2020  -BP stable  -Continue Coreg, Imdur  -ACEi held in anticipation of R/LHC    Abnormal ECG  -Ischemic evaluation with LHC vs MPI stress test pending clinical course    Acute combined systolic and diastolic CHF, NYHA class 4  -Presents with acute decompensated combined CHF  -EF 20%  -Continue IV diuresis for additional day  -Continue BB, ACEi  -Add Imdur  -Strict I's/O's  -Ischemic evaluation prior to d/c    5/25/2020  -Clinically improved  -Continue BB, po Lasix, Imdur  -Hold ACEi in anticipation of R/LHC, postponed today due to possible DT's  -Will reassess in AM    5/26/2020  -Stable overnight  -Continue BB, Lasix, Imdur  -R/LHC today        VTE Risk Mitigation (From admission, onward)         Ordered     IP VTE HIGH RISK PATIENT  Once      05/20/20 2058     Place sequential compression device  Until discontinued      05/20/20 2058                Renata Chowdhury PA-C  Cardiology  [facility]

## 2020-05-26 NOTE — NURSING TRANSFER
Nursing Transfer Note      5/26/2020     Transfer To: 252    Transfer via bed    Transfer with cardiac monitoring    Transported by maris carranza    Medicines sent: none    Chart send with patient: Yes    Notified: sister mario    Patient reassessed at: TRANSFERRED TO ROOM. TRANSFERRED TO BED.  TELEMETRY MONITER ON.  IV FLUIDS ON PUMP AT PRESCRIBED RATE.  PROCEDURAL ACCESS SITE WITHOUT BLEEDING OR HEMATOMA.  DRESSING DRY AND INTACT.  CARE HANDED OFF TO mel[facility].......... (date, time)    Upon arrival to floor: cardiac monitor applied, patient oriented to room, call bell in reach and bed in lowest position

## 2020-05-26 NOTE — ASSESSMENT & PLAN NOTE
-Troponin > 6 this AM, repeat this afternoon  -Continue ASA, Plavix, BB, statin, ACEi  -Add Imdur 30 mg daily  -Continue heparin gtt for additional day  -EF 20%  -Will need ischemic evaluation with probable LHC pending clinical course    5/25/2020  -Remains chest pain free  -Continue ASA, Plavix, BB, statin  -Hold ACEi in anticipation of LHC  -Ok to stop heparin gtt  -NPO after MN  -Reassess in AM    5/26/2020  -Stable overnight  -Continue ASA, Plavix, BB, statin  -R/LHC today. All risks benefits, and treatment alternatives explained to patient in detail. All questions answered. He has agreed to proceed

## 2020-05-26 NOTE — ASSESSMENT & PLAN NOTE
Currently Euvolemic. IV lasix changed to PO lasix .   5/25- Lasix on hold and started on gentle hydration for possible LHC.   5/26- LHC today

## 2020-05-26 NOTE — SUBJECTIVE & OBJECTIVE
Review of Systems   Constitution: Negative.   HENT: Negative.    Eyes: Negative.    Cardiovascular: Negative.    Respiratory: Negative.    Endocrine: Negative.    Hematologic/Lymphatic: Negative.    Skin: Negative.    Musculoskeletal: Positive for arthritis and joint pain.   Gastrointestinal: Negative.    Genitourinary: Negative.    Psychiatric/Behavioral: Negative.    Allergic/Immunologic: Negative.      Objective:     Vital Signs (Most Recent):  Temp: 99.3 °F (37.4 °C) (05/26/20 1136)  Pulse: 94 (05/26/20 1136)  Resp: 18 (05/26/20 1136)  BP: 116/61 (05/26/20 1136)  SpO2: 95 % (05/26/20 1136) Vital Signs (24h Range):  Temp:  [97.2 °F (36.2 °C)-100 °F (37.8 °C)] 99.3 °F (37.4 °C)  Pulse:  [] 94  Resp:  [16-18] 18  SpO2:  [93 %-98 %] 95 %  BP: (106-182)/(58-82) 116/61     Weight: 88 kg (194 lb 0.1 oz)  Body mass index is 28.65 kg/m².     SpO2: 95 %  O2 Device (Oxygen Therapy): room air      Intake/Output Summary (Last 24 hours) at 5/26/2020 1249  Last data filed at 5/26/2020 0500  Gross per 24 hour   Intake 734.17 ml   Output 1575 ml   Net -840.83 ml       Lines/Drains/Airways     Peripheral Intravenous Line                 Peripheral IV - Single Lumen 05/20/20 2240 20 G Left Hand 5 days                Physical Exam   Constitutional: He is oriented to person, place, and time. He appears well-developed and well-nourished. No distress.   HENT:   Head: Normocephalic and atraumatic.   Eyes: Pupils are equal, round, and reactive to light. Right eye exhibits no discharge. Left eye exhibits no discharge.   Neck: Neck supple. No JVD present.   Cardiovascular: Normal rate, regular rhythm, S1 normal, S2 normal and normal heart sounds.   No murmur heard.  Pulmonary/Chest: Effort normal and breath sounds normal. No respiratory distress. He has no wheezes. He has no rales.   Abdominal: Soft. He exhibits no distension.   Musculoskeletal: He exhibits no edema.   Neurological: He is alert and oriented to person, place, and  time.   Skin: Skin is warm and dry. He is not diaphoretic. No erythema.   Psychiatric: He has a normal mood and affect. His behavior is normal. Thought content normal.   Nursing note and vitals reviewed.      Significant Labs:   CMP   Recent Labs   Lab 05/25/20 0427 05/26/20  0437     139 138   K 3.8  3.8 3.8     102 104   CO2 24  25 21*     108 107   BUN 41*  36* 31*   CREATININE 2.0*  2.0* 1.6*   CALCIUM 9.3  9.4 9.0   PROT 7.0  --    ALBUMIN 3.0*  3.0* 2.7*   BILITOT 0.4  --    ALKPHOS 111  --    AST 8*  --    ALT 13  --    ANIONGAP 12  12 13   ESTGFRAFRICA 40*  40* 53*   EGFRNONAA 35*  35* 46*   , CBC   Recent Labs   Lab 05/25/20  0428   WBC 11.46   HGB 13.4*   HCT 41.4      , Troponin No results for input(s): TROPONINI in the last 48 hours. and All pertinent lab results from the last 24 hours have been reviewed.    Significant Imaging: Echocardiogram: 2D echo with color flow doppler: No results found for this or any previous visit., EKG: Reviewed and X-Ray: CXR: X-Ray Chest 1 View (CXR): No results found for this visit on 05/20/20. and X-Ray Chest PA and Lateral (CXR): No results found for this visit on 05/20/20.

## 2020-05-26 NOTE — SUBJECTIVE & OBJECTIVE
Interval History:      Creatinine is down to 1.6 . Vitals are stable . [facility] today    Review of Systems   Constitutional: Negative for activity change, appetite change and fever.   HENT: Negative for sore throat.    Eyes: Negative for visual disturbance.   Respiratory: Negative for cough, chest tightness and shortness of breath.    Cardiovascular: Negative for chest pain, palpitations and leg swelling.   Gastrointestinal: Negative for abdominal distention, abdominal pain, constipation, diarrhea, nausea and vomiting.   Endocrine: Negative for polyuria.   Genitourinary: Negative for decreased urine volume, dysuria, flank pain, frequency and hematuria.   Musculoskeletal: Negative for back pain and gait problem.   Skin: Negative for rash.   Neurological: Negative for syncope, speech difficulty, weakness, light-headedness and headaches.   Psychiatric/Behavioral: Negative for confusion, hallucinations and sleep disturbance.     Objective:     Vital Signs (Most Recent):  Temp: 98.2 °F (36.8 °C) (05/26/20 1615)  Pulse: 102 (05/26/20 1745)  Resp: 18 (05/26/20 1745)  BP: (!) 145/70 (05/26/20 1745)  SpO2: 100 % (05/26/20 1745) Vital Signs (24h Range):  Temp:  [97.2 °F (36.2 °C)-100 °F (37.8 °C)] 98.2 °F (36.8 °C)  Pulse:  [] 102  Resp:  [14-20] 18  SpO2:  [93 %-100 %] 100 %  BP: (106-145)/(58-82) 145/70     Weight: 88 kg (194 lb 0.1 oz)  Body mass index is 28.65 kg/m².    Intake/Output Summary (Last 24 hours) at 5/26/2020 1810  Last data filed at 5/26/2020 1730  Gross per 24 hour   Intake 1074.17 ml   Output 1475 ml   Net -400.83 ml      Physical Exam   Constitutional: He is oriented to person, place, and time. He appears well-developed and well-nourished. No distress.   HENT:   Head: Normocephalic and atraumatic.   Mouth/Throat: No oropharyngeal exudate.   Eyes: Pupils are equal, round, and reactive to light. Conjunctivae and EOM are normal.   Neck: Normal range of motion. Neck supple. No JVD present. No thyromegaly  present.   Cardiovascular: Normal rate, regular rhythm and normal heart sounds.   No murmur heard.  Pulmonary/Chest: Effort normal and breath sounds normal. No respiratory distress. He has no wheezes. He has no rales. He exhibits no tenderness.   Abdominal: Soft. Bowel sounds are normal. He exhibits no distension. There is no tenderness. There is no rebound and no guarding.   Musculoskeletal: Normal range of motion. He exhibits no edema.   Lymphadenopathy:     He has no cervical adenopathy.   Neurological: He is alert and oriented to person, place, and time. He displays normal reflexes. No cranial nerve deficit or sensory deficit.   Skin: Skin is warm and dry. No rash noted. He is not diaphoretic.   Psychiatric: He has a normal mood and affect.       Significant Labs:   CBC:   Recent Labs   Lab 05/25/20 0428   WBC 11.46   HGB 13.4*   HCT 41.4        CMP:   Recent Labs   Lab 05/25/20 0427 05/26/20  0437     139 138   K 3.8  3.8 3.8     102 104   CO2 24  25 21*     108 107   BUN 41*  36* 31*   CREATININE 2.0*  2.0* 1.6*   CALCIUM 9.3  9.4 9.0   PROT 7.0  --    ALBUMIN 3.0*  3.0* 2.7*   BILITOT 0.4  --    ALKPHOS 111  --    AST 8*  --    ALT 13  --    ANIONGAP 12  12 13   EGFRNONAA 35*  35* 46*       Significant Imaging:

## 2020-05-26 NOTE — OP NOTE
INPATIENT Operative Note         SUMMARY     Surgery Date: 5/26/2020     Surgeon(s) and Role:     * Olga Correia MD - Primary    ASSISTANT:none    Pre-op Diagnosis:  NSTEMI (non-ST elevated myocardial infarction) [I21.4]      Post-op Diagnosis:  NSTEMI (non-ST elevated myocardial infarction) [I21.4]    Procedure(s) (LRB):  CATHETERIZATION, HEART, BOTH LEFT AND RIGHT (N/A)    COMPLICATION:none    Anesthesia: RN IV Sedation    Findings/Key Components:  Severe 3 vessel cad   Cardiomyopathy ef 15-20%   tortuopus infrarenal aorta and iliacs w/o any significant stenosis.   Estimated Blood Loss: < 50 ML.         SPECIMEN: NONE    Devices/Prostetics: None    PLAN:  Maximize medical therapy discuss options of revascularization.

## 2020-05-26 NOTE — ASSESSMENT & PLAN NOTE
-Presents with acute decompensated combined CHF  -EF 20%  -Continue IV diuresis for additional day  -Continue BB, ACEi  -Add Imdur  -Strict I's/O's  -Ischemic evaluation prior to d/c    5/25/2020  -Clinically improved  -Continue BB, po Lasix, Imdur  -Hold ACEi in anticipation of R/LHC, postponed today due to possible DT's  -Will reassess in AM    5/26/2020  -Stable overnight  -Continue BB, Lasix, Imdur  -R/LHC today

## 2020-05-26 NOTE — PROGRESS NOTES
[facility] Hospital Medicine  Progress Note    Patient Name: Ceasar Hernandez  MRN: [id number]  Patient Class: IP- Inpatient   Admission Date: 5/20/2020  Length of Stay: 6 days  Attending Physician: Timothy Hunter MD  Primary Care Provider: Provider [facility]        Subjective:     Principal Problem:NSTEMI (non-ST elevated myocardial infarction)        HPI:  Pt is a 59 yo male with PMhx of COPD and HTN who was admitted to ICU in early AM of 5/21/2020 for Acute Respiratory Failure requiring BIPAP and decompensated combined heart failure. Pt reported WINSTON and lightheadedness. COVID 19 negative. Troponins were elevated 1.180 > 2.686 > 2.573 and Cardiology recommended heparin infusion. Pt was in acute CHF exacerbation and 2 d ECHO showed LVEF 20 % and diastolic dysfunction. Diuresis with IV lasix given. Initially, pt required supplemental oxygen via NIPPV which has thus been weaned. Also, pt received Plavix, BB, ACE inhibitor, ASA and STATIN. Pt's respiratory status improved and he was downgraded to Telemetry unit.     Overview/Hospital Course:  05/22/2020 Admitted 05/20/2020 to ICU for NSTEMI and Acute Combined Systolic and Diastolic heart failure. ECHO showed EF 20% and severe diastolic dysfunction. He stabilized and was transferred out of ICU to telemetry. Cardiology following. CV Meds include carvedilol, lisinopril, furosemide, isosorbide mononitrate, atorvastatin, and clopidogrel, aspirin. Symptoms MUCH improved. No present chest pain. Breathing much more comfortably. Good diuresis. Anticipating cardiac catheterization when more stable, probably Monday.    5/25/20- [facility] postponed by Cardiology this  morning due to concern over tremors  or shakes . Last drink was 5 days ago. Pt denies any H/O delirium tremens or alcohol withdrawal  in the past , states he can go days without drinking. States he was shaking due to room being too cold. Vitals are stable . Pt is awake , alert and oriented x 3 and  situation. Labs resulted Na 140, K 3.8, BUN 36, Creatinine 2.0.     5/26- Pt has no C/O today . Tremor or shakes are better . Creatinine is down to 1.6 . Vitals are stable . [facility] today     Interval History:      Creatinine is down to 1.6 . Vitals are stable . [facility] today    Review of Systems   Constitutional: Negative for activity change, appetite change and fever.   HENT: Negative for sore throat.    Eyes: Negative for visual disturbance.   Respiratory: Negative for cough, chest tightness and shortness of breath.    Cardiovascular: Negative for chest pain, palpitations and leg swelling.   Gastrointestinal: Negative for abdominal distention, abdominal pain, constipation, diarrhea, nausea and vomiting.   Endocrine: Negative for polyuria.   Genitourinary: Negative for decreased urine volume, dysuria, flank pain, frequency and hematuria.   Musculoskeletal: Negative for back pain and gait problem.   Skin: Negative for rash.   Neurological: Negative for syncope, speech difficulty, weakness, light-headedness and headaches.   Psychiatric/Behavioral: Negative for confusion, hallucinations and sleep disturbance.     Objective:     Vital Signs (Most Recent):  Temp: 98.2 °F (36.8 °C) (05/26/20 1615)  Pulse: 102 (05/26/20 1745)  Resp: 18 (05/26/20 1745)  BP: (!) 145/70 (05/26/20 1745)  SpO2: 100 % (05/26/20 1745) Vital Signs (24h Range):  Temp:  [97.2 °F (36.2 °C)-100 °F (37.8 °C)] 98.2 °F (36.8 °C)  Pulse:  [] 102  Resp:  [14-20] 18  SpO2:  [93 %-100 %] 100 %  BP: (106-145)/(58-82) 145/70     Weight: 88 kg (194 lb 0.1 oz)  Body mass index is 28.65 kg/m².    Intake/Output Summary (Last 24 hours) at 5/26/2020 1810  Last data filed at 5/26/2020 1730  Gross per 24 hour   Intake 1074.17 ml   Output 1475 ml   Net -400.83 ml      Physical Exam   Constitutional: He is oriented to person, place, and time. He appears well-developed and well-nourished. No distress.   HENT:   Head: Normocephalic and atraumatic.   Mouth/Throat: No  oropharyngeal exudate.   Eyes: Pupils are equal, round, and reactive to light. Conjunctivae and EOM are normal.   Neck: Normal range of motion. Neck supple. No JVD present. No thyromegaly present.   Cardiovascular: Normal rate, regular rhythm and normal heart sounds.   No murmur heard.  Pulmonary/Chest: Effort normal and breath sounds normal. No respiratory distress. He has no wheezes. He has no rales. He exhibits no tenderness.   Abdominal: Soft. Bowel sounds are normal. He exhibits no distension. There is no tenderness. There is no rebound and no guarding.   Musculoskeletal: Normal range of motion. He exhibits no edema.   Lymphadenopathy:     He has no cervical adenopathy.   Neurological: He is alert and oriented to person, place, and time. He displays normal reflexes. No cranial nerve deficit or sensory deficit.   Skin: Skin is warm and dry. No rash noted. He is not diaphoretic.   Psychiatric: He has a normal mood and affect.       Significant Labs:   CBC:   Recent Labs   Lab 05/25/20 0428   WBC 11.46   HGB 13.4*   HCT 41.4        CMP:   Recent Labs   Lab 05/25/20 0427 05/26/20  0437     139 138   K 3.8  3.8 3.8     102 104   CO2 24  25 21*     108 107   BUN 41*  36* 31*   CREATININE 2.0*  2.0* 1.6*   CALCIUM 9.3  9.4 9.0   PROT 7.0  --    ALBUMIN 3.0*  3.0* 2.7*   BILITOT 0.4  --    ALKPHOS 111  --    AST 8*  --    ALT 13  --    ANIONGAP 12  12 13   EGFRNONAA 35*  35* 46*       Significant Imaging:       Assessment/Plan:      * NSTEMI (non-ST elevated myocardial infarction)  5/24-On aspirin, plavix  Coreg,lipitor, heparin drip. Flora score 130. Cards planning [facility]. Hold ACEI due to worsening kidney functions   5/25- [facility] postponed by Cardiology due to concern over tremor . Will start Librium   5/26- [facility] today . Further plan pending  [facility]     Acute combined systolic and diastolic CHF, NYHA class 4  Currently Euvolemic. IV lasix changed to PO lasix .   5/25- Lasix on hold and  started on gentle hydration for possible LHC.   5/26- LHC today     Respiratory distress, acute  05/26/2020 RESOLVED.    CRI (chronic renal insufficiency)  - Baseline creatinine is unknown .   - Stable creatinine since admission at 1.9 to 2.1       ETOH abuse  - Pt denies dependency and any h/o withdrawal in the past   -Last drink on 5/20/20   -Stat Librium       COPD (chronic obstructive pulmonary disease)  05/25/2020  ASSESSMENT: Chronic condition. Maintaining SpO2 on room air.   PLAN:  Continue present treatment plan.        Panlobular emphysema  - Stable   -Bronchodilator inhalational treatment as needed       PAD (peripheral artery disease)        Anxiety  05/25/2020  ASSESSMENT: Anxiety about health, exacerbated by smoking cessation and insomnia. [facility] Controlled Prescription Drug Monitoring database was queried and showed no activity to suggest abuse, diversion, or other inappropriate use of prescription medications.  PLAN:  Alprazolam PRN.        Leukocytosis  05/25/2020  ASSESSMENT: No infectious source. Could be reaction to corticosteroids or stress.   PLAN:  Monitor.    Recent Labs   Lab 05/20/20  0811 05/20/20  2240 05/21/20  0355 05/22/20  0707 05/23/20  0524 05/24/20  0519 05/25/20  0428   WBC 13.83* 13.80* 16.00* 13.65* 11.01 10.89 11.46     Microbiology Results (last 7 days)     Procedure Component Value Units Date/Time    Blood culture x two cultures. Draw prior to antibiotics. [[id number]] Collected:  05/20/20 0814    Order Status:  Completed Specimen:  Blood from Peripheral, Antecubital, Right Updated:  05/24/20 1612     Blood Culture, Routine No Growth to date      No Growth to date      No Growth to date      No Growth to date      No Growth to date    Narrative:       Aerobic and anaerobic    Blood culture x two cultures. Draw prior to antibiotics. [[id number]] Collected:  05/20/20 0811    Order Status:  Completed Specimen:  Blood from Peripheral, Antecubital, Left Updated:   05/24/20 1612     Blood Culture, Routine No Growth to date      No Growth to date      No Growth to date      No Growth to date      No Growth to date    Narrative:       Aerobic and anaerobic         Antibiotics (From admission, onward)    None           Smoking history  05/25/2020  ASSESSMENT: He is currently at the action (quitting) stage of smoking cessation. Smoking cessation encouraged.    PLAN:  Continue nicotine patch. Referral to tobacco cessation program after discharge.      Lactic acidosis  05/25/2020  ASSESSMENT: RESOLVED.  Recent Labs   Lab 05/20/20  0811 05/20/20  2240 05/21/20  1151   LACTATE 6.3* 2.3* 1.8        Essential hypertension  05/25/2020  ASSESSMENT: Controlled.   PLAN:  Continue present treatment plan.  Temp:  [97.3 °F (36.3 °C)-98.6 °F (37 °C)] 98.4 °F (36.9 °C)  Pulse:  [] 94  Resp:  [16-18] 18  SpO2:  [96 %-99 %] 97 %  BP: ()/(67-82) 106/68        Abnormal ECG          VTE Risk Mitigation (From admission, onward)         Ordered     IP VTE HIGH RISK PATIENT  Once      05/20/20 2058     Place sequential compression device  Until discontinued      05/20/20 2058                      Timothy Hunter MD  Department of Hospital Medicine   [facility]

## 2020-05-26 NOTE — SUBJECTIVE & OBJECTIVE
No current facility-administered medications on file prior to encounter.      No current outpatient medications on file prior to encounter.       Review of patient's allergies indicates:  No Known Allergies    Past Medical History:   Diagnosis Date    COPD (chronic obstructive pulmonary disease)     Gout     Hypertension     NSTEMI (non-ST elevated myocardial infarction) 5/20/2020     Past Surgical History:   Procedure Laterality Date    FRACTURE SURGERY       Family History     None        Tobacco Use    Smoking status: Current Every Day Smoker     Packs/day: 1.00     Years: 40.00     Pack years: 40.00     Types: Cigarettes     Start date: 1/1/1979    Smokeless tobacco: Never Used   Substance and Sexual Activity    Alcohol use: Yes     Comment:  PINT/DAILY    Drug use: Not Currently    Sexual activity: Not Currently     Partners: Female     Review of Systems   Constitutional: Positive for activity change and fatigue. Negative for appetite change, chills, diaphoresis, fever and unexpected weight change.   HENT: Positive for congestion, dental problem, postnasal drip and tinnitus. Negative for drooling, ear discharge, ear pain, facial swelling, hearing loss, mouth sores, nosebleeds, rhinorrhea, sinus pressure, sinus pain, sneezing, sore throat, trouble swallowing and voice change.         Missing/ broken teeth   Eyes: Negative for photophobia, pain, discharge, redness, itching and visual disturbance.   Respiratory: Positive for cough, chest tightness, shortness of breath and wheezing. Negative for choking and stridor.    Cardiovascular: Negative for chest pain, palpitations and leg swelling.   Gastrointestinal: Negative for abdominal distention, abdominal pain, anal bleeding, blood in stool, constipation, diarrhea, nausea, rectal pain and vomiting.   Endocrine: Negative for cold intolerance, heat intolerance, polydipsia, polyphagia and polyuria.   Genitourinary: Negative for decreased urine volume,  difficulty urinating, discharge, dysuria, enuresis, flank pain, frequency, genital sores, hematuria, penile pain, penile swelling, scrotal swelling, testicular pain and urgency.   Musculoskeletal: Positive for arthralgias, gait problem and joint swelling. Negative for back pain, myalgias, neck pain and neck stiffness.        Right generalized foot pain, patient contributes to gout, increased pain to palpation of the foot. Patient also reports generalized pain to the right hand, associates the pain with gout.    Skin: Negative for color change, pallor, rash and wound.   Allergic/Immunologic: Negative for environmental allergies, food allergies and immunocompromised state.   Neurological: Positive for dizziness, tremors, weakness and light-headedness. Negative for seizures, syncope, facial asymmetry, speech difficulty, numbness and headaches.   Hematological: Does not bruise/bleed easily.   Psychiatric/Behavioral: Positive for sleep disturbance. Negative for agitation, behavioral problems, confusion, decreased concentration, dysphoric mood, hallucinations, self-injury and suicidal ideas. The patient is nervous/anxious. The patient is not hyperactive.      Objective:     Vital Signs (Most Recent):  Temp: 98.2 °F (36.8 °C) (05/26/20 1615)  Pulse: 102 (05/26/20 1730)  Resp: 20 (05/26/20 1730)  BP: (!) 142/80 (05/26/20 1730)  SpO2: 100 % (05/26/20 1730) Vital Signs (24h Range):  Temp:  [97.2 °F (36.2 °C)-100 °F (37.8 °C)] 98.2 °F (36.8 °C)  Pulse:  [] 102  Resp:  [14-20] 20  SpO2:  [93 %-100 %] 100 %  BP: (106-144)/(58-82) 142/80     Weight: 88 kg (194 lb 0.1 oz)  Body mass index is 28.65 kg/m².    SpO2: 100 %  O2 Device (Oxygen Therapy): nasal cannula     Intake/Output - Last 3 Shifts       05/24 0700 - 05/25 0659 05/25 0700 - 05/26 0659 05/26 0700 - 05/27 0659    P.O. 956 510 200    I.V. (mL/kg) 869.8 (9.8) 224.2 (2.5) 400 (4.5)    IV Piggyback   250    Total Intake(mL/kg) 1825.8 (20.6) 734.2 (8.3) 850 (9.7)     Urine (mL/kg/hr) 1250 (0.6) 1575 (0.7) 600 (0.6)    Stool  0     Total Output 1250 1575 600    Net +575.8 -840.8 +250           Stool Occurrence  1 x            Lines/Drains/Airways     Peripheral Intravenous Line                 Peripheral IV - Single Lumen 05/20/20 2240 20 G Left Hand 5 days                 STS Risk Score:  Risk of Mortality:  7.099%  Renal Failure:  8.680%  Permanent Stroke:  5.388%  Prolonged Ventilation:  41.797%  DSW Infection:  0.645%  Reoperation:  4.570%  Morbidity or Mortality:  47.241%  Short Length of Stay:  14.918%  Long Length of Stay:  29.457%      Physical Exam   Constitutional: He is oriented to person, place, and time. He appears well-developed and well-nourished. No distress.   Tremors generalized.    HENT:   Head: Normocephalic and atraumatic.   Eyes: Pupils are equal, round, and reactive to light. EOM are normal.   Neck: Normal range of motion. Neck supple. No JVD present.   Cardiovascular: Normal rate, regular rhythm, normal heart sounds and intact distal pulses. Exam reveals no gallop and no friction rub.   No murmur heard.  Pulmonary/Chest: He has wheezes. He has rales. He exhibits no tenderness.   Increased respiratory effort. Coarse breath sounds to DEION posterior lung bases.    Abdominal: Soft. Bowel sounds are normal. He exhibits no distension and no mass. There is no tenderness. There is no rebound and no guarding. No hernia.   Musculoskeletal: He exhibits tenderness. He exhibits no edema or deformity.   The right foot is tender to palpation generalized. The right hand is tender to palpation generalized, patient contributes pain to gout.    Neurological: He is alert and oriented to person, place, and time. He displays normal reflexes. No cranial nerve deficit or sensory deficit. He exhibits normal muscle tone. Coordination normal.   Skin: Skin is warm and dry. Capillary refill takes less than 2 seconds. No rash noted. He is not diaphoretic. No erythema. No pallor.    Psychiatric: He has a normal mood and affect. His behavior is normal. Judgment and thought content normal.   Nursing note and vitals reviewed.      Significant Labs:  BMP:   Recent Labs   Lab 05/26/20  0437         K 3.8      CO2 21*   BUN 31*   CREATININE 1.6*   CALCIUM 9.0   MG 2.2     CBC:   Recent Labs   Lab 05/25/20  0428   WBC 11.46   RBC 4.27*   HGB 13.4*   HCT 41.4      MCV 97   MCH 31.4*   MCHC 32.4       Significant Diagnostics:  I have reviewed all pertinent imaging results/findings within the past 24 hours.

## 2020-05-26 NOTE — PLAN OF CARE
No significant events throughout shift. Poc reviewed with pt and verbalized understanding. Pt denies needs and pain. Resp even and non labored. Vss. 0 s/s acute distress noted. Gentle hydration started at 0030 per md order. Pt npo for C in am. Pt turns and repositions self. NSR to ST on cardiac monitor.PO meds tolerated well with 0 problems noted. Safety intact. Bed in low position. Call light in reach sr up x2. Will cont to monitor and eval throughout shift.

## 2020-05-26 NOTE — PLAN OF CARE
"CM met with patient at the bedside to assess for discharge needs.  Patient states that he lives with his sister and she will help him at home.  He denied the need for any medical equipment or home health services.  RUTH ANN inquired about a PCP and he stated that he has a PCP but could not recall the name but it is on the "same road as [place]".  He understands that he will need to follow up with his PCP and will make his own follow up appointment.     05/26/20 0944   Discharge Reassessment   Assessment Type Discharge Planning Reassessment   Provided patient/caregiver education on the expected discharge date and the discharge plan Yes   Do you have any problems affording any of your prescribed medications? No   Discharge Plan A Home with family   DME Needed Upon Discharge  none   Patient choice form signed by patient/caregiver N/A   Anticipated Discharge Disposition Home   Can the patient/caregiver answer the patient profile reliably? Yes, cognitively intact     "

## 2020-05-26 NOTE — PROGRESS NOTES
Pulmonary Note    History reviewed , patient examined, lab and radiographic studies reviewed.  Exam stable.  Assessment:  Patient Active Problem List   Diagnosis    Acute combined systolic and diastolic CHF, NYHA class 4    Abnormal ECG    NSTEMI (non-ST elevated myocardial infarction)    Essential hypertension    COPD (chronic obstructive pulmonary disease)    Lactic acidosis    Smoking history    Respiratory distress, acute    Leukocytosis    Anxiety    Panlobular emphysema    PAD (peripheral artery disease)    ETOH abuse    CRI (chronic renal insufficiency)       Plan:  No new suggestions  Will sign off  Please re consult if any new problems develop    Iglesia Montenegro MD  Pulmonary / Critical Care Medicine

## 2020-05-27 LAB
ANION GAP SERPL CALC-SCNC: 10 MMOL/L (ref 8–16)
BASOPHILS # BLD AUTO: 0.06 K/UL (ref 0–0.2)
BASOPHILS NFR BLD: 0.5 % (ref 0–1.9)
BUN SERPL-MCNC: 23 MG/DL (ref 8–23)
CALCIUM SERPL-MCNC: 8.9 MG/DL (ref 8.7–10.5)
CHLORIDE SERPL-SCNC: 107 MMOL/L (ref 95–110)
CO2 SERPL-SCNC: 21 MMOL/L (ref 23–29)
CREAT SERPL-MCNC: 1.6 MG/DL (ref 0.5–1.4)
DIFFERENTIAL METHOD: ABNORMAL
EOSINOPHIL # BLD AUTO: 0.1 K/UL (ref 0–0.5)
EOSINOPHIL NFR BLD: 0.8 % (ref 0–8)
ERYTHROCYTE [DISTWIDTH] IN BLOOD BY AUTOMATED COUNT: 16.8 % (ref 11.5–14.5)
EST. GFR  (AFRICAN AMERICAN): 53 ML/MIN/1.73 M^2
EST. GFR  (NON AFRICAN AMERICAN): 46 ML/MIN/1.73 M^2
GLUCOSE SERPL-MCNC: 87 MG/DL (ref 70–110)
HCT VFR BLD AUTO: 36.7 % (ref 40–54)
HGB BLD-MCNC: 11.9 G/DL (ref 14–18)
IMM GRANULOCYTES # BLD AUTO: 0.16 K/UL (ref 0–0.04)
IMM GRANULOCYTES NFR BLD AUTO: 1.4 % (ref 0–0.5)
LYMPHOCYTES # BLD AUTO: 1.1 K/UL (ref 1–4.8)
LYMPHOCYTES NFR BLD: 9.4 % (ref 18–48)
MAGNESIUM SERPL-MCNC: 2.2 MG/DL (ref 1.6–2.6)
MCH RBC QN AUTO: 31.2 PG (ref 27–31)
MCHC RBC AUTO-ENTMCNC: 32.4 G/DL (ref 32–36)
MCV RBC AUTO: 96 FL (ref 82–98)
MONOCYTES # BLD AUTO: 2.1 K/UL (ref 0.3–1)
MONOCYTES NFR BLD: 18.2 % (ref 4–15)
NEUTROPHILS # BLD AUTO: 8.2 K/UL (ref 1.8–7.7)
NEUTROPHILS NFR BLD: 69.7 % (ref 38–73)
NRBC BLD-RTO: 0 /100 WBC
PHOSPHATE SERPL-MCNC: 3.5 MG/DL (ref 2.7–4.5)
PLATELET # BLD AUTO: 141 K/UL (ref 150–350)
PMV BLD AUTO: ABNORMAL FL (ref 9.2–12.9)
POTASSIUM SERPL-SCNC: 4.2 MMOL/L (ref 3.5–5.1)
RBC # BLD AUTO: 3.82 M/UL (ref 4.6–6.2)
SODIUM SERPL-SCNC: 138 MMOL/L (ref 136–145)
WBC # BLD AUTO: 11.69 K/UL (ref 3.9–12.7)

## 2020-05-27 PROCEDURE — 25000003 PHARM REV CODE 250: Performed by: INTERNAL MEDICINE

## 2020-05-27 PROCEDURE — 99232 SBSQ HOSP IP/OBS MODERATE 35: CPT | Mod: ,,, | Performed by: INTERNAL MEDICINE

## 2020-05-27 PROCEDURE — 25000003 PHARM REV CODE 250: Performed by: PHYSICIAN ASSISTANT

## 2020-05-27 PROCEDURE — 99232 PR SUBSEQUENT HOSPITAL CARE,LEVL II: ICD-10-PCS | Mod: ,,, | Performed by: INTERNAL MEDICINE

## 2020-05-27 PROCEDURE — 97167 OT EVAL HIGH COMPLEX 60 MIN: CPT

## 2020-05-27 PROCEDURE — 99900037 HC PT THERAPY SCREENING (STAT)

## 2020-05-27 PROCEDURE — S4991 NICOTINE PATCH NONLEGEND: HCPCS | Performed by: INTERNAL MEDICINE

## 2020-05-27 PROCEDURE — 21400001 HC TELEMETRY ROOM

## 2020-05-27 PROCEDURE — 97530 THERAPEUTIC ACTIVITIES: CPT

## 2020-05-27 PROCEDURE — 97162 PT EVAL MOD COMPLEX 30 MIN: CPT

## 2020-05-27 PROCEDURE — 85025 COMPLETE CBC W/AUTO DIFF WBC: CPT

## 2020-05-27 PROCEDURE — 84100 ASSAY OF PHOSPHORUS: CPT

## 2020-05-27 PROCEDURE — 83735 ASSAY OF MAGNESIUM: CPT

## 2020-05-27 PROCEDURE — 80048 BASIC METABOLIC PNL TOTAL CA: CPT

## 2020-05-27 PROCEDURE — 36415 COLL VENOUS BLD VENIPUNCTURE: CPT

## 2020-05-27 RX ORDER — ATORVASTATIN CALCIUM 40 MG/1
80 TABLET, FILM COATED ORAL NIGHTLY
Status: DISCONTINUED | OUTPATIENT
Start: 2020-05-27 | End: 2020-05-29 | Stop reason: HOSPADM

## 2020-05-27 RX ORDER — CHLORDIAZEPOXIDE HYDROCHLORIDE 10 MG/1
10 CAPSULE, GELATIN COATED ORAL 2 TIMES DAILY
Status: DISCONTINUED | OUTPATIENT
Start: 2020-05-27 | End: 2020-05-29 | Stop reason: HOSPADM

## 2020-05-27 RX ORDER — LOSARTAN POTASSIUM 25 MG/1
25 TABLET ORAL DAILY
Status: DISCONTINUED | OUTPATIENT
Start: 2020-05-27 | End: 2020-05-29 | Stop reason: HOSPADM

## 2020-05-27 RX ORDER — FUROSEMIDE 20 MG/1
20 TABLET ORAL DAILY
Status: DISCONTINUED | OUTPATIENT
Start: 2020-05-28 | End: 2020-05-29 | Stop reason: HOSPADM

## 2020-05-27 RX ORDER — LOPERAMIDE HYDROCHLORIDE 2 MG/1
2 CAPSULE ORAL 4 TIMES DAILY PRN
Status: DISCONTINUED | OUTPATIENT
Start: 2020-05-27 | End: 2020-05-29 | Stop reason: HOSPADM

## 2020-05-27 RX ORDER — ASPIRIN 81 MG/1
81 TABLET ORAL DAILY
Status: DISCONTINUED | OUTPATIENT
Start: 2020-05-28 | End: 2020-05-29 | Stop reason: HOSPADM

## 2020-05-27 RX ORDER — RANOLAZINE 500 MG/1
500 TABLET, EXTENDED RELEASE ORAL 2 TIMES DAILY
Status: DISCONTINUED | OUTPATIENT
Start: 2020-05-27 | End: 2020-05-28

## 2020-05-27 RX ORDER — FUROSEMIDE 40 MG/1
40 TABLET ORAL DAILY
Status: DISCONTINUED | OUTPATIENT
Start: 2020-05-27 | End: 2020-05-27

## 2020-05-27 RX ADMIN — CHLORDIAZEPOXIDE HYDROCHLORIDE 10 MG: 10 CAPSULE ORAL at 09:05

## 2020-05-27 RX ADMIN — ATORVASTATIN CALCIUM 80 MG: 40 TABLET, FILM COATED ORAL at 09:05

## 2020-05-27 RX ADMIN — CARVEDILOL 3.12 MG: 3.12 TABLET, FILM COATED ORAL at 09:05

## 2020-05-27 RX ADMIN — FUROSEMIDE 40 MG: 40 TABLET ORAL at 09:05

## 2020-05-27 RX ADMIN — LOSARTAN POTASSIUM 25 MG: 25 TABLET, FILM COATED ORAL at 11:05

## 2020-05-27 RX ADMIN — CLOPIDOGREL BISULFATE 75 MG: 75 TABLET ORAL at 09:05

## 2020-05-27 RX ADMIN — Medication 1 PATCH: at 09:05

## 2020-05-27 RX ADMIN — ISOSORBIDE MONONITRATE 30 MG: 30 TABLET, EXTENDED RELEASE ORAL at 09:05

## 2020-05-27 RX ADMIN — ASPIRIN 325 MG: 325 TABLET ORAL at 09:05

## 2020-05-27 RX ADMIN — ACETAMINOPHEN 650 MG: 325 TABLET ORAL at 07:05

## 2020-05-27 RX ADMIN — RANOLAZINE 500 MG: 500 TABLET, FILM COATED, EXTENDED RELEASE ORAL at 11:05

## 2020-05-27 RX ADMIN — RANOLAZINE 500 MG: 500 TABLET, FILM COATED, EXTENDED RELEASE ORAL at 09:05

## 2020-05-27 NOTE — SUBJECTIVE & OBJECTIVE
Review of Systems   Constitution: Positive for malaise/fatigue.   HENT: Negative.    Eyes: Negative.    Cardiovascular: Negative.    Respiratory: Negative.    Endocrine: Negative.    Hematologic/Lymphatic: Negative.    Skin: Negative.    Musculoskeletal: Positive for arthritis and joint pain.   Gastrointestinal: Negative.    Genitourinary: Negative.    Neurological: Negative.    Psychiatric/Behavioral: Negative.    Allergic/Immunologic: Negative.      Objective:     Vital Signs (Most Recent):  Temp: 97.6 °F (36.4 °C) (05/27/20 1140)  Pulse: 92 (05/27/20 1140)  Resp: 19 (05/27/20 1140)  BP: 119/67 (05/27/20 1140)  SpO2: 95 % (05/27/20 1140) Vital Signs (24h Range):  Temp:  [97 °F (36.1 °C)-98.7 °F (37.1 °C)] 97.6 °F (36.4 °C)  Pulse:  [] 92  Resp:  [14-20] 19  SpO2:  [91 %-100 %] 95 %  BP: ()/(55-80) 119/67     Weight: 88.5 kg (195 lb 1.7 oz)  Body mass index is 28.81 kg/m².     SpO2: 95 %  O2 Device (Oxygen Therapy): room air      Intake/Output Summary (Last 24 hours) at 5/27/2020 1446  Last data filed at 5/27/2020 0600  Gross per 24 hour   Intake 2310 ml   Output 1600 ml   Net 710 ml       Lines/Drains/Airways     Peripheral Intravenous Line                 Peripheral IV - Single Lumen 05/20/20 2240 20 G Left Hand 6 days                Physical Exam   Constitutional: He is oriented to person, place, and time. He appears well-developed and well-nourished. No distress.   HENT:   Head: Normocephalic and atraumatic.   Eyes: Pupils are equal, round, and reactive to light. Right eye exhibits no discharge. Left eye exhibits no discharge.   Neck: Neck supple. No JVD present.   Cardiovascular: Normal rate, regular rhythm, S1 normal, S2 normal and normal heart sounds.   No murmur heard.  Pulmonary/Chest: Effort normal and breath sounds normal. No respiratory distress. He has no wheezes. He has no rales.   Abdominal: Soft. He exhibits no distension.   Musculoskeletal: He exhibits no edema.   Neurological: He  is alert and oriented to person, place, and time.   Skin: Skin is warm and dry. He is not diaphoretic. No erythema.   Left brachial and radial access sites C/D/I; no bleeding erythema or drainage   Psychiatric: He has a normal mood and affect. His behavior is normal. Thought content normal.   Nursing note and vitals reviewed.      Significant Labs:   CMP   Recent Labs   Lab 05/26/20  0437 05/27/20  0621    138   K 3.8 4.2    107   CO2 21* 21*    87   BUN 31* 23   CREATININE 1.6* 1.6*   CALCIUM 9.0 8.9   ALBUMIN 2.7*  --    ANIONGAP 13 10   ESTGFRAFRICA 53* 53*   EGFRNONAA 46* 46*   , CBC   Recent Labs   Lab 05/27/20  0621   WBC 11.69   HGB 11.9*   HCT 36.7*   *   , Troponin No results for input(s): TROPONINI in the last 48 hours. and All pertinent lab results from the last 24 hours have been reviewed.    Significant Imaging: Echocardiogram: 2D echo with color flow doppler: No results found for this or any previous visit., EKG: REviewed and X-Ray: CXR: X-Ray Chest 1 View (CXR): No results found for this visit on 05/20/20. and X-Ray Chest PA and Lateral (CXR): No results found for this visit on 05/20/20.

## 2020-05-27 NOTE — ASSESSMENT & PLAN NOTE
Currently Euvolemic. IV lasix changed to PO lasix .   5/25- Lasix on hold and started on gentle hydration for possible LHC.   5/26- LHC today   5/27- LHC revealed severe 3 vessels CAD. CT Surgery consulted and pt deemed  not a candidate for coronary artery bypass grafting due to poor coronary targets. Cardiology to optimize medical therapy

## 2020-05-27 NOTE — PLAN OF CARE
Patient had uneventful shift. Patient awake, alert and oriented x 4. VS stable. Patient denies SOB. Patient complains of swelling and joint pain to right hand, wrist, and leg. PRN tylenol given. Patient on telemetry, NSR on the monitor. Patient ambulates with assistance. PT/OT eval completed today. Fall precautions in place. Bed alarm active and audible. Patient free from fall/injury. Plan of care reviewed with patient. Patient has no questions at this time. Will continue to monitor.

## 2020-05-27 NOTE — ASSESSMENT & PLAN NOTE
-Presents with acute decompensated combined CHF  -EF 20%  -Continue IV diuresis for additional day  -Continue BB, ACEi  -Add Imdur  -Strict I's/O's  -Ischemic evaluation prior to d/c    5/25/2020  -Clinically improved  -Continue BB, po Lasix, Imdur  -Hold ACEi in anticipation of R/LHC, postponed today due to possible DT's  -Will reassess in AM    5/26/2020  -Stable overnight  -Continue BB, Lasix, Imdur  -R/LHC today    5/27/2020  -Continue BB, Lasix, Imdur  -Add Losartan 25 mg daily  -Reassess in AM  -LHC showed multivessel CAD, will need LifeVest prior to d/c for SCD prevention--->ICM

## 2020-05-27 NOTE — ASSESSMENT & PLAN NOTE
-Troponin > 6 this AM, repeat this afternoon  -Continue ASA, Plavix, BB, statin, ACEi  -Add Imdur 30 mg daily  -Continue heparin gtt for additional day  -EF 20%  -Will need ischemic evaluation with probable LHC pending clinical course    5/25/2020  -Remains chest pain free  -Continue ASA, Plavix, BB, statin  -Hold ACEi in anticipation of LHC  -Ok to stop heparin gtt  -NPO after MN  -Reassess in AM    5/26/2020  -Stable overnight  -Continue ASA, Plavix, BB, statin  -R/LHC today. All risks benefits, and treatment alternatives explained to patient in detail. All questions answered. He has agreed to proceed    5/27/2020  -LHC showed multivessel CAD  -Not candidate for CABG due to poor targets  -Will optimize medical therapy  -Continue ASA, Plavix, BB, statin, Imdur  -Add Ranexa 500 mg BID and Losartan 25 mg daily  -Reassess in AM

## 2020-05-27 NOTE — PT/OT/SLP EVAL
Physical Therapy Evaluation    Patient Name:  Ceasar Hernandez   MRN:  [id number]    Recommendations:     Discharge Recommendations:  nursing facility, skilled, home health PT(SNF VS HHPT DEPENDING ON PROGRESS WITH POC)   Discharge Equipment Recommendations: bedside commode, bath bench, walker, rolling   Barriers to discharge: None    Assessment:     Ceasar Hernandez is a 60 y.o. male admitted with a medical diagnosis of NSTEMI (non-ST elevated myocardial infarction).  He presents with the following impairments/functional limitations:  weakness, impaired endurance, impaired functional mobilty, gait instability, impaired balance, decreased coordination, decreased safety awareness, decreased lower extremity function.    Rehab Prognosis: Good; patient would benefit from acute skilled PT services to address these deficits and reach maximum level of function.    Recent Surgery: Procedure(s) (LRB):  CATHETERIZATION, HEART, BOTH LEFT AND RIGHT (N/A) 1 Day Post-Op    Plan:     During this hospitalization, patient to be seen 5 x/week to address the identified rehab impairments via gait training, therapeutic activities, therapeutic exercises and progress toward the following goals:    · Plan of Care Expires:  06/03/20    Subjective     Chief Complaint: PAIN AND WEAKNESS  Patient/Family Comments/goals:   Pain/Comfort:  · Pain Rating 1: 9/10  · Location - Side 1: Right  · Location - Orientation 1: generalized  · Location 1: hand(C/O GOUT)  · Pain Rating 2: 9/10  · Location - Side 2: Right  · Location 2: foot(C/O GOUT)    Patients cultural, spiritual, Muslim conflicts given the current situation:     Living Environment:  PT LIVES WITH SISTER WHO IS ABLE TO ASSIST AS NEEDED, 1 STORY HOUSE NO STEPS, AMB INDEP COMMUNITY DISTANCES, DOES NOT WORK, STILL DRIVES, INDEP WITH ADL'S  Prior to admission, patients level of function was INDEP.  Equipment used at home: none.  DME owned (not currently used): none.  Upon discharge, patient  will have assistance from SISTER.    Objective:     Communicated with NURSE MARTY prior to session.  Patient found supine with telemetry, peripheral IV  upon PT entry to room.    General Precautions: Standard, fall   Orthopedic Precautions:N/A   Braces: N/A     Exams:  · Cognitive Exam:  Patient is oriented to Person, Place, Time, Situation and PT REQUIRED MAX ENCOURAGEMENT TO PARTICIPATE  · Postural Exam:  Patient presented with the following abnormalities:    · -       Rounded shoulders  · -       Forward head  · Sensation:    · -       Intact  · Skin Integrity/Edema:      · -       Edema: Moderate R HAND, R FOOT  · RLE ROM: LIMITED  · RLE Strength: GROSSLY 2-/5  · LLE ROM: LIMITED  · LLE Strength: GROSSLY 2+/5    Functional Mobility:  · Bed Mobility:     · Rolling Left:  moderate assistance  · Rolling Right: moderate assistance  · Scooting: maximal assistance  · Supine to Sit: maximal assistance and of 2 persons  · Sit to Supine: minimum assistance  · Transfers:     · Sit to Stand:  PT DECLINED TO ATTEMPT TF DESPITE ENCOURAGEMENT  · Balance: POOR+ SITTING BALANCE    Therapeutic Activities and Exercises:   PT EDUCATED IN ROLE OF P.T. AND POC, PT ENCOURAGED TO INCREASE MOVEMENT DESPITE PAIN AND WEAKNESS, EDUCATED IN BLE THEREX TO PERFORM WHILE SUPINE IN BED, PT ASSISTED TO EOB BUT DECLINED SIT<>STAND TF OR TF TO CHAIR SO ASSISTED BACK TO SUPINE    AM-PAC 6 CLICK MOBILITY  Total Score:8     Patient left HOB elevated with all lines intact, call button in reach and NURSE notified.    GOALS:   Multidisciplinary Problems     Physical Therapy Goals        Problem: Physical Therapy Goal    Goal Priority Disciplines Outcome Goal Variances Interventions   Physical Therapy Goal     PT, PT/OT      Description:  LTG'S TO BE MET IN 7 DAYS (6-3-20)  1. PT WILL REQUIRE KIMBERLY FOR SUP>SIT  2. PT WILL REQUIRE MODA FOR TF  3. PT WILL ' WITH RW AND MODA                    History:     Past Medical History:   Diagnosis Date     COPD (chronic obstructive pulmonary disease)     Gout     Hypertension     NSTEMI (non-ST elevated myocardial infarction) 5/20/2020       Past Surgical History:   Procedure Laterality Date    CATHETERIZATION OF BOTH LEFT AND RIGHT HEART N/A 5/26/2020    Procedure: CATHETERIZATION, HEART, BOTH LEFT AND RIGHT;  Surgeon: Olga Correia MD;  Location: [facility] CATH LAB;  Service: Cardiology;  Laterality: N/A;    FRACTURE SURGERY         Time Tracking:     PT Received On: 05/27/20  PT Start Time: 1035     PT Stop Time: 1100  PT Total Time (min): 25 min     Billable Minutes: Evaluation 15 and Therapeutic Activity 10    Josefina Jensen, PT  05/27/2020

## 2020-05-27 NOTE — SUBJECTIVE & OBJECTIVE
Interval History:   S/P LHC/RHC yesterday . Noted severe 3 vessels CAD and CT Surgery was consulted . After careful evaluation CT surgery suggested that the patient with severe multivessel coronary artery disease with an EF of 15 to 20% % is not a candidate for coronary artery bypass grafting due to poor coronary targets. Cardiology will optimize medical therapy. Consult PT/OT before planning discharge.         Review of Systems   Constitutional: Positive for activity change, appetite change and fatigue. Negative for fever.   HENT: Negative for sore throat.    Eyes: Negative for visual disturbance.   Respiratory: Negative for cough, chest tightness and shortness of breath.    Cardiovascular: Negative for chest pain, palpitations and leg swelling.   Gastrointestinal: Negative for abdominal distention, abdominal pain, constipation, diarrhea, nausea and vomiting.        Loose stool today x 2    Endocrine: Negative for polyuria.   Genitourinary: Negative for decreased urine volume, dysuria, flank pain, frequency and hematuria.   Musculoskeletal: Negative for back pain and gait problem.   Skin: Negative for rash.   Neurological: Negative for syncope, speech difficulty, weakness, light-headedness and headaches.   Psychiatric/Behavioral: Negative for confusion, hallucinations and sleep disturbance.     Objective:     Vital Signs (Most Recent):  Temp: 97.6 °F (36.4 °C) (05/27/20 1140)  Pulse: 92 (05/27/20 1140)  Resp: 19 (05/27/20 1140)  BP: 119/67 (05/27/20 1140)  SpO2: 95 % (05/27/20 1140) Vital Signs (24h Range):  Temp:  [97 °F (36.1 °C)-98.7 °F (37.1 °C)] 97.6 °F (36.4 °C)  Pulse:  [] 92  Resp:  [14-20] 19  SpO2:  [91 %-100 %] 95 %  BP: ()/(55-80) 119/67     Weight: 88.5 kg (195 lb 1.7 oz)  Body mass index is 28.81 kg/m².    Intake/Output Summary (Last 24 hours) at 5/27/2020 1220  Last data filed at 5/27/2020 0600  Gross per 24 hour   Intake 2310 ml   Output 1600 ml   Net 710 ml      Physical Exam    Constitutional: He is oriented to person, place, and time. He appears well-developed and well-nourished. No distress.   HENT:   Head: Normocephalic and atraumatic.   Mouth/Throat: No oropharyngeal exudate.   Eyes: Pupils are equal, round, and reactive to light. Conjunctivae and EOM are normal.   Neck: Normal range of motion. Neck supple. No JVD present. No thyromegaly present.   Cardiovascular: Normal rate, regular rhythm and normal heart sounds.   No murmur heard.  Pulmonary/Chest: Effort normal and breath sounds normal. No respiratory distress. He has no wheezes. He has no rales. He exhibits no tenderness.   Abdominal: Soft. Bowel sounds are normal. He exhibits no distension. There is no tenderness. There is no rebound and no guarding.   Musculoskeletal: Normal range of motion. He exhibits no edema.   Lymphadenopathy:     He has no cervical adenopathy.   Neurological: He is alert and oriented to person, place, and time. He displays normal reflexes. No cranial nerve deficit or sensory deficit.   Skin: Skin is warm and dry. No rash noted. He is not diaphoretic.   Psychiatric: He has a normal mood and affect.       Significant Labs:   CBC:   Recent Labs   Lab 05/27/20  0621   WBC 11.69   HGB 11.9*   HCT 36.7*   *     CMP:   Recent Labs   Lab 05/26/20  0437 05/27/20  0621    138   K 3.8 4.2    107   CO2 21* 21*    87   BUN 31* 23   CREATININE 1.6* 1.6*   CALCIUM 9.0 8.9   ALBUMIN 2.7*  --    ANIONGAP 13 10   EGFRNONAA 46* 46*       Significant Imaging:

## 2020-05-27 NOTE — CONSULTS
"[facility]  Cardiothoracic Surgery  Consult Note    Patient Name: Ceasar Hernandez  MRN: [id number]  Admission Date: 5/20/2020  Attending Physician: Timothy Hunter MD  Referring Provider: Self, Aaareferral    Patient information was obtained from patient, past medical records and ER records.     Consults  Subjective:     Principal Problem: NSTEMI (non-ST elevated myocardial infarction)    History of Present Illness: 05/26/2020  The patient is a 60 year old male with severe multivessel coronary artery disease with an EF of 15% who was worked up in the [facility] ED for NSTEMI and acute respiratory failure. The patient has COPD, HTN, acute combined systolic and diastolic CHF, PAD, anxiety, gout, current everyday smoker 1 ppd x 40 years, +ETOH 1 pint of beer per day. The patient reports that he was in his usual state of health up to 1 week ago when he began having acute severe SOB, wheezing, WINSTON, cough, fatigue, dizziness, + orthopnea, and weakness which progressively worsened so he called the ambulance and was taken to [facility] ED on 05/20/2020. The patient denies chest pain, back pain, jaw pain, shoulder pain. The patient reports that over the past 15 years he has lived a sedentary life because of his SOB and WINSTON. Patient reports that he is unable to walk more than 25 to 50 feet at a time due to SOB. No PCP. Reports he "was on medication but he ran out, and he never got any refills". ED workup showed NSTEMI, trop 1.180 > 6.890, BNP 1194, elevated lactic acid, EKG inferolateral st-t abnormality, and /109. LHC was delayed due to symptoms of DTs, chronic ETOH use. Patient having tremors during exam. LHC performed today by Dr. Correia. Patient reports an active gout flair up localized to the right foot and right hand. Denies chest pain, palpitations, fever, syncope, falls, abdominal pain, constipation, diarrhea, and edema.      No current facility-administered medications on file prior to encounter.      No " current outpatient medications on file prior to encounter.       Review of patient's allergies indicates:  No Known Allergies    Past Medical History:   Diagnosis Date    COPD (chronic obstructive pulmonary disease)     Gout     Hypertension     NSTEMI (non-ST elevated myocardial infarction) 5/20/2020     Past Surgical History:   Procedure Laterality Date    FRACTURE SURGERY       Family History     None        Tobacco Use    Smoking status: Current Every Day Smoker     Packs/day: 1.00     Years: 40.00     Pack years: 40.00     Types: Cigarettes     Start date: 1/1/1979    Smokeless tobacco: Never Used   Substance and Sexual Activity    Alcohol use: Yes     Comment:  PINT/DAILY    Drug use: Not Currently    Sexual activity: Not Currently     Partners: Female     Review of Systems   Constitutional: Positive for activity change and fatigue. Negative for appetite change, chills, diaphoresis, fever and unexpected weight change.   HENT: Positive for congestion, dental problem, postnasal drip and tinnitus. Negative for drooling, ear discharge, ear pain, facial swelling, hearing loss, mouth sores, nosebleeds, rhinorrhea, sinus pressure, sinus pain, sneezing, sore throat, trouble swallowing and voice change.         Missing/ broken teeth   Eyes: Negative for photophobia, pain, discharge, redness, itching and visual disturbance.   Respiratory: Positive for cough, chest tightness, shortness of breath and wheezing. Negative for choking and stridor.    Cardiovascular: Negative for chest pain, palpitations and leg swelling.   Gastrointestinal: Negative for abdominal distention, abdominal pain, anal bleeding, blood in stool, constipation, diarrhea, nausea, rectal pain and vomiting.   Endocrine: Negative for cold intolerance, heat intolerance, polydipsia, polyphagia and polyuria.   Genitourinary: Negative for decreased urine volume, difficulty urinating, discharge, dysuria, enuresis, flank pain, frequency, genital sores,  hematuria, penile pain, penile swelling, scrotal swelling, testicular pain and urgency.   Musculoskeletal: Positive for arthralgias, gait problem and joint swelling. Negative for back pain, myalgias, neck pain and neck stiffness.        Right generalized foot pain, patient contributes to gout, increased pain to palpation of the foot. Patient also reports generalized pain to the right hand, associates the pain with gout.    Skin: Negative for color change, pallor, rash and wound.   Allergic/Immunologic: Negative for environmental allergies, food allergies and immunocompromised state.   Neurological: Positive for dizziness, tremors, weakness and light-headedness. Negative for seizures, syncope, facial asymmetry, speech difficulty, numbness and headaches.   Hematological: Does not bruise/bleed easily.   Psychiatric/Behavioral: Positive for sleep disturbance. Negative for agitation, behavioral problems, confusion, decreased concentration, dysphoric mood, hallucinations, self-injury and suicidal ideas. The patient is nervous/anxious. The patient is not hyperactive.      Objective:     Vital Signs (Most Recent):  Temp: 98.2 °F (36.8 °C) (05/26/20 1615)  Pulse: 102 (05/26/20 1730)  Resp: 20 (05/26/20 1730)  BP: (!) 142/80 (05/26/20 1730)  SpO2: 100 % (05/26/20 1730) Vital Signs (24h Range):  Temp:  [97.2 °F (36.2 °C)-100 °F (37.8 °C)] 98.2 °F (36.8 °C)  Pulse:  [] 102  Resp:  [14-20] 20  SpO2:  [93 %-100 %] 100 %  BP: (106-144)/(58-82) 142/80     Weight: 88 kg (194 lb 0.1 oz)  Body mass index is 28.65 kg/m².    SpO2: 100 %  O2 Device (Oxygen Therapy): nasal cannula     Intake/Output - Last 3 Shifts       05/24 0700 - 05/25 0659 05/25 0700 - 05/26 0659 05/26 0700 - 05/27 0659    P.O. 956 510 200    I.V. (mL/kg) 869.8 (9.8) 224.2 (2.5) 400 (4.5)    IV Piggyback   250    Total Intake(mL/kg) 1825.8 (20.6) 734.2 (8.3) 850 (9.7)    Urine (mL/kg/hr) 1250 (0.6) 1575 (0.7) 600 (0.6)    Stool  0     Total Output 1250 1575 600     Net +575.8 -840.8 +250           Stool Occurrence  1 x            Lines/Drains/Airways     Peripheral Intravenous Line                 Peripheral IV - Single Lumen 05/20/20 2240 20 G Left Hand 5 days                 STS Risk Score:  Risk of Mortality:  7.099%  Renal Failure:  8.680%  Permanent Stroke:  5.388%  Prolonged Ventilation:  41.797%  DSW Infection:  0.645%  Reoperation:  4.570%  Morbidity or Mortality:  47.241%  Short Length of Stay:  14.918%  Long Length of Stay:  29.457%      Physical Exam   Constitutional: He is oriented to person, place, and time. He appears well-developed and well-nourished. No distress.   Tremors generalized.    HENT:   Head: Normocephalic and atraumatic.   Eyes: Pupils are equal, round, and reactive to light. EOM are normal.   Neck: Normal range of motion. Neck supple. No JVD present.   Cardiovascular: Normal rate, regular rhythm, normal heart sounds and intact distal pulses. Exam reveals no gallop and no friction rub.   No murmur heard.  Pulmonary/Chest: He has wheezes. He has rales. He exhibits no tenderness.   Increased respiratory effort. Coarse breath sounds to DEION posterior lung bases.    Abdominal: Soft. Bowel sounds are normal. He exhibits no distension and no mass. There is no tenderness. There is no rebound and no guarding. No hernia.   Musculoskeletal: He exhibits tenderness. He exhibits no edema or deformity.   The right foot is tender to palpation generalized. The right hand is tender to palpation generalized, patient contributes pain to gout.    Neurological: He is alert and oriented to person, place, and time. He displays normal reflexes. No cranial nerve deficit or sensory deficit. He exhibits normal muscle tone. Coordination normal.   Skin: Skin is warm and dry. Capillary refill takes less than 2 seconds. No rash noted. He is not diaphoretic. No erythema. No pallor.   Psychiatric: He has a normal mood and affect. His behavior is normal. Judgment and thought  content normal.   Nursing note and vitals reviewed.      Significant Labs:  BMP:   Recent Labs   Lab 05/26/20  0437         K 3.8      CO2 21*   BUN 31*   CREATININE 1.6*   CALCIUM 9.0   MG 2.2     CBC:   Recent Labs   Lab 05/25/20  0428   WBC 11.46   RBC 4.27*   HGB 13.4*   HCT 41.4      MCV 97   MCH 31.4*   MCHC 32.4       Significant Diagnostics:  I have reviewed all pertinent imaging results/findings within the past 24 hours.    Assessment/Plan:     NYHA Score: NYHA IV: inability to carry on any physical activity without discomfort    * NSTEMI (non-ST elevated myocardial infarction)  05/26/2020  The patient is a 60 year old male with severe multivessel coronary artery disease with an EF of 15% who was worked up in the [facility] ED for NSTEMI and acute respiratory failure. The patient has COPD, HTN, acute combined systolic and diastolic CHF, PAD, anxiety, gout, current everyday smoker 1 ppd x 40 years, +ETOH 1 pint of beer per day. The patient with severe multivessel coronary artery disease with an EF of 15% is not a candidate for coronary artery bypass grafting due to poor coronary targets.        Thank you for your consult. I will sign off. Please contact us if you have any additional questions.    Jass Dugan NP  Cardiothoracic Surgery  [facility]

## 2020-05-27 NOTE — PLAN OF CARE
Pt remains fall free this shift.  Pt AAOx4, verbal, clear speech.  Skin warm and dry. No new skin issues.  Telemonitoring in progress, SR 90s.  Left wrist puncture site from [facility], no hematoma present, +2 pulse. Immobilizer present  Room air  BiPAP at , wore for 2 hrs  Urinal  Independent with transfers.   Bed low, side rails up x 2, wheels locked, call light in reach.   Bed alarm maintained for safety.   Patient instructed to call for assistance.   Hourly rounding completed.   24 hour chart check completed  POC updated and reviewed with pt. Will continue POC.

## 2020-05-27 NOTE — ASSESSMENT & PLAN NOTE
05/27/2020  ASSESSMENT: He is currently at the action (quitting) stage of smoking cessation. Smoking cessation encouraged.    PLAN:  Continue nicotine patch. Referral to tobacco cessation program after discharge.

## 2020-05-27 NOTE — PROGRESS NOTES
[facility] Hospital Medicine  Progress Note    Patient Name: Ceasar Hernnadez  MRN: [id number]  Patient Class: IP- Inpatient   Admission Date: 5/20/2020  Length of Stay: 7 days  Attending Physician: Timothy Hunter MD  Primary Care Provider: Provider [facility]        Subjective:     Principal Problem:NSTEMI (non-ST elevated myocardial infarction)        HPI:  Pt is a 59 yo male with PMhx of COPD and HTN who was admitted to ICU in early AM of 5/21/2020 for Acute Respiratory Failure requiring BIPAP and decompensated combined heart failure. Pt reported WINSTON and lightheadedness. COVID 19 negative. Troponins were elevated 1.180 > 2.686 > 2.573 and Cardiology recommended heparin infusion. Pt was in acute CHF exacerbation and 2 d ECHO showed LVEF 20 % and diastolic dysfunction. Diuresis with IV lasix given. Initially, pt required supplemental oxygen via NIPPV which has thus been weaned. Also, pt received Plavix, BB, ACE inhibitor, ASA and STATIN. Pt's respiratory status improved and he was downgraded to Telemetry unit.     Overview/Hospital Course:  05/22/2020 Admitted 05/20/2020 to ICU for NSTEMI and Acute Combined Systolic and Diastolic heart failure. ECHO showed EF 20% and severe diastolic dysfunction. He stabilized and was transferred out of ICU to telemetry. Cardiology following. CV Meds include carvedilol, lisinopril, furosemide, isosorbide mononitrate, atorvastatin, and clopidogrel, aspirin. Symptoms MUCH improved. No present chest pain. Breathing much more comfortably. Good diuresis. Anticipating cardiac catheterization when more stable, probably Monday.    5/25/20- [facility] postponed by Cardiology this  morning due to concern over tremors  or shakes . Last drink was 5 days ago. Pt denies any H/O delirium tremens or alcohol withdrawal  in the past , states he can go days without drinking. States he was shaking due to room being too cold. Vitals are stable . Pt is awake , alert and oriented x 3 and  situation. Labs resulted Na 140, K 3.8, BUN 36, Creatinine 2.0.     5/26- Pt has no C/O today . Tremor or shakes are better . Creatinine is down to 1.6 . Vitals are stable . [facility] today     5/27- S/P LHC/RHC yesterday . Noted severe 3 vessels CAD and CT Surgery was consulted . After careful evaluation CT surgery suggested that the patient with severe multivessel coronary artery disease with an EF of 15 to 20% % is not a candidate for coronary artery bypass grafting due to poor coronary targets. Cardiology will optimize medical therapy. Consult PT/OT before planning discharge.     Interval History:   S/P LHC/RHC yesterday . Noted severe 3 vessels CAD and CT Surgery was consulted . After careful evaluation CT surgery suggested that the patient with severe multivessel coronary artery disease with an EF of 15 to 20% % is not a candidate for coronary artery bypass grafting due to poor coronary targets. Cardiology will optimize medical therapy. Consult PT/OT before planning discharge.         Review of Systems   Constitutional: Positive for activity change, appetite change and fatigue. Negative for fever.   HENT: Negative for sore throat.    Eyes: Negative for visual disturbance.   Respiratory: Negative for cough, chest tightness and shortness of breath.    Cardiovascular: Negative for chest pain, palpitations and leg swelling.   Gastrointestinal: Negative for abdominal distention, abdominal pain, constipation, diarrhea, nausea and vomiting.        Loose stool today x 2    Endocrine: Negative for polyuria.   Genitourinary: Negative for decreased urine volume, dysuria, flank pain, frequency and hematuria.   Musculoskeletal: Negative for back pain and gait problem.   Skin: Negative for rash.   Neurological: Negative for syncope, speech difficulty, weakness, light-headedness and headaches.   Psychiatric/Behavioral: Negative for confusion, hallucinations and sleep disturbance.     Objective:     Vital Signs (Most  Recent):  Temp: 97.6 °F (36.4 °C) (05/27/20 1140)  Pulse: 92 (05/27/20 1140)  Resp: 19 (05/27/20 1140)  BP: 119/67 (05/27/20 1140)  SpO2: 95 % (05/27/20 1140) Vital Signs (24h Range):  Temp:  [97 °F (36.1 °C)-98.7 °F (37.1 °C)] 97.6 °F (36.4 °C)  Pulse:  [] 92  Resp:  [14-20] 19  SpO2:  [91 %-100 %] 95 %  BP: ()/(55-80) 119/67     Weight: 88.5 kg (195 lb 1.7 oz)  Body mass index is 28.81 kg/m².    Intake/Output Summary (Last 24 hours) at 5/27/2020 1220  Last data filed at 5/27/2020 0600  Gross per 24 hour   Intake 2310 ml   Output 1600 ml   Net 710 ml      Physical Exam   Constitutional: He is oriented to person, place, and time. He appears well-developed and well-nourished. No distress.   HENT:   Head: Normocephalic and atraumatic.   Mouth/Throat: No oropharyngeal exudate.   Eyes: Pupils are equal, round, and reactive to light. Conjunctivae and EOM are normal.   Neck: Normal range of motion. Neck supple. No JVD present. No thyromegaly present.   Cardiovascular: Normal rate, regular rhythm and normal heart sounds.   No murmur heard.  Pulmonary/Chest: Effort normal and breath sounds normal. No respiratory distress. He has no wheezes. He has no rales. He exhibits no tenderness.   Abdominal: Soft. Bowel sounds are normal. He exhibits no distension. There is no tenderness. There is no rebound and no guarding.   Musculoskeletal: Normal range of motion. He exhibits no edema.   Lymphadenopathy:     He has no cervical adenopathy.   Neurological: He is alert and oriented to person, place, and time. He displays normal reflexes. No cranial nerve deficit or sensory deficit.   Skin: Skin is warm and dry. No rash noted. He is not diaphoretic.   Psychiatric: He has a normal mood and affect.       Significant Labs:   CBC:   Recent Labs   Lab 05/27/20  0621   WBC 11.69   HGB 11.9*   HCT 36.7*   *     CMP:   Recent Labs   Lab 05/26/20  0437 05/27/20  0621    138   K 3.8 4.2    107   CO2 21* 21*   GLU  107 87   BUN 31* 23   CREATININE 1.6* 1.6*   CALCIUM 9.0 8.9   ALBUMIN 2.7*  --    ANIONGAP 13 10   EGFRNONAA 46* 46*       Significant Imaging:       Assessment/Plan:      * NSTEMI (non-ST elevated myocardial infarction)  5/24-On aspirin, plavix  Coreg,lipitor, heparin drip. Flora score 130. Cards planning LHC. Hold ACEI due to worsening kidney functions   5/25- LHC postponed by Cardiology due to concern over tremor . Will start Librium   5/26- C today . Further plan pending  LHC  5/27- LHC revealed severe 3 vessels CAD. CT Surgery consulted and pt deemed  not a candidate for coronary artery bypass grafting due to poor coronary targets. Cardiology to optimize medical therapy     Acute combined systolic and diastolic CHF, NYHA class 4  Currently Euvolemic. IV lasix changed to PO lasix .   5/25- Lasix on hold and started on gentle hydration for possible LHC.   5/26- LHC today   5/27- LHC revealed severe 3 vessels CAD. CT Surgery consulted and pt deemed  not a candidate for coronary artery bypass grafting due to poor coronary targets. Cardiology to optimize medical therapy       Respiratory distress, acute  05/27/2020 RESOLVED.    CRI (chronic renal insufficiency)  - Baseline creatinine is unknown .   - Stable creatinine since admission at 1.9 to 2.1   -Renal indices remains stable       ETOH abuse  - Pt denies dependency and any h/o withdrawal in the past   -Last drink on 5/20/20   -Stat Librium       COPD (chronic obstructive pulmonary disease)  05/27/2020  ASSESSMENT: Chronic condition. Maintaining SpO2 on room air.   PLAN:  Continue present treatment plan.        Panlobular emphysema  - Stable   -Bronchodilator inhalational treatment as needed       PAD (peripheral artery disease)        Anxiety  05/27/2020  ASSESSMENT: Anxiety about health, exacerbated by smoking cessation and insomnia. [place] Board of Pharmacy Controlled Prescription Drug Monitoring database was queried and showed no activity to suggest  abuse, diversion, or other inappropriate use of prescription medications.  PLAN:  Alprazolam PRN.        Leukocytosis  05/27/2020  ASSESSMENT: No infectious source. Could be reaction to corticosteroids or stress.   PLAN:  Monitor.    Recent Labs   Lab 05/20/20  2240 05/21/20  0355 05/22/20  0707 05/23/20  0524 05/24/20  0519 05/25/20  0428 05/27/20  0621   WBC 13.80* 16.00* 13.65* 11.01 10.89 11.46 11.69     Microbiology Results (last 7 days)     Procedure Component Value Units Date/Time    Blood culture x two cultures. Draw prior to antibiotics. [[id number]] Collected:  05/20/20 0811    Order Status:  Completed Specimen:  Blood from Peripheral, Antecubital, Left Updated:  05/25/20 1612     Blood Culture, Routine No growth after 5 days.    Narrative:       Aerobic and anaerobic    Blood culture x two cultures. Draw prior to antibiotics. [[id number]] Collected:  05/20/20 0814    Order Status:  Completed Specimen:  Blood from Peripheral, Antecubital, Right Updated:  05/25/20 1612     Blood Culture, Routine No growth after 5 days.    Narrative:       Aerobic and anaerobic         Antibiotics (From admission, onward)    None           Smoking history  05/27/2020  ASSESSMENT: He is currently at the action (quitting) stage of smoking cessation. Smoking cessation encouraged.    PLAN:  Continue nicotine patch. Referral to tobacco cessation program after discharge.      Lactic acidosis  05/27/2020  ASSESSMENT: RESOLVED.  Recent Labs   Lab 05/20/20  2240 05/21/20  1151   LACTATE 2.3* 1.8        Essential hypertension  05/27/2020  ASSESSMENT: Controlled.   PLAN:  Continue present treatment plan.  Temp:  [97 °F (36.1 °C)-98.7 °F (37.1 °C)] 97.6 °F (36.4 °C)  Pulse:  [] 92  Resp:  [14-20] 19  SpO2:  [91 %-100 %] 95 %  BP: ()/(55-80) 119/67        Abnormal ECG          VTE Risk Mitigation (From admission, onward)         Ordered     IP VTE HIGH RISK PATIENT  Once      05/20/20 2058     Place sequential compression  device  Until discontinued      05/20/20 2058                      Timothy Hunter MD  Department of Hospital Medicine   [facility]

## 2020-05-27 NOTE — ASSESSMENT & PLAN NOTE
- Baseline creatinine is unknown .   - Stable creatinine since admission at 1.9 to 2.1   -Renal indices remains stable

## 2020-05-27 NOTE — PROGRESS NOTES
[facility] -   Cardiology  Progress Note    Patient Name: Ceasar Hernandez  MRN: [id number]  Admission Date: 5/20/2020  Hospital Length of Stay: 7 days  Code Status: Full Code   Attending Physician: Timothy Hunter MD   Primary Care Physician: Provider Notinsystem  Expected Discharge Date:   Principal Problem:NSTEMI (non-ST elevated myocardial infarction)    Subjective:   HPI:  Pt presents with acute respiratory failure.  Per ER chart, suspicious for Covid 19.  Cardiology consult performed based on communication from ER physician and chart review.  He has h/o COPD.  Pt presented to ER with c/o dyspnea since yesterday with associated wheezing.  Per chart no cp sxs.  ECG in ER showed possible SVT.  Adenosine administered and f/u ecgs looked more like sinus tachycardia.  BP very high on arrival 217/109.  Given Labetalol in ER.    BNP 1194  Troponin 0.09  Lactic acid +  ecg with inferolateral st-t abnl.  No old records to compare/review.    Hospital Course:   5/21/20:  PT SEEN AND EXAMINED IN ICU.  HE FELT REMARKABLY IMPROVED, ON NASAL CANNULA.  DENIES CHEST PAIN SXS.  DYSPNEA MUCH IMPROVED.  TROPONIN PEAK NEAR 2.6  BP HAS BEEN VERY HIGH. LACTIC ACID NORMAL NOW.  CREATININE STABLE.    5/22/2020-Patient seen and examined today, resting in bed. Feeling much better, SOB greatly improved. Some mild chest pressure, overnight, has since resolved. Labs reviewed. Troponin> 6 this AM, will repeat later today. Creatinine 1.9. BNP improved but still elevated.     5/23/20:  PT SEEN AND EXAMINED THIS AM.  NO CP SXS.  STILL WITH SOME DYSPNEA BUT MUCH IMPROVED.  LABS STABLE OVERALL. CREATININE BUMPED UP SLIGHTLY.  TROPONIN TRENDING DOWN.  BP MUCH IMPROVED.  ALSO DRINKS 1 PINT ETOH EVERY FEW DAYS.     5/24/20:  PT SEEN AND EXAMINED THIS AM. FELT BETTER.  NO RECURRENT CP.  DYSPNEA IMPROVED.  CREATININE STABLE, 1.9.  BNP MUCH IMPROVED.  BP REMAINS WELL CONTROLLED NOW.  + ABNL VASC STUDIES INFRAPOPLITEAL  "DISEASE.    5/25/2020-Patient seen and examined, lying in bed. Feels ok. Complains of having the "shakes". No chest pain or SOB. Labs reviewed. Creatinine stable at 2.0. L/RHC postponed due to possible DT's, discussed with hospital medicine.    5/26/2020-Patient seen and examined today, lying comfortably in bed. Feeling better. Shakes have resolved. No chest pain/SOB. Labs reviewed. Creatinine improved to 1.6. L/RHC today.    5/27/2020-Patient seen and examined today, s/p LHC yesterday that showed multivessel CAD. Feels ok. No chest pain/SOB. CVT evaluated patient, not candidate for CABG given poor targets. Labs reviewed and are stable. Meds further optimized.         Review of Systems   Constitution: Positive for malaise/fatigue.   HENT: Negative.    Eyes: Negative.    Cardiovascular: Negative.    Respiratory: Negative.    Endocrine: Negative.    Hematologic/Lymphatic: Negative.    Skin: Negative.    Musculoskeletal: Positive for arthritis and joint pain.   Gastrointestinal: Negative.    Genitourinary: Negative.    Neurological: Negative.    Psychiatric/Behavioral: Negative.    Allergic/Immunologic: Negative.      Objective:     Vital Signs (Most Recent):  Temp: 97.6 °F (36.4 °C) (05/27/20 1140)  Pulse: 92 (05/27/20 1140)  Resp: 19 (05/27/20 1140)  BP: 119/67 (05/27/20 1140)  SpO2: 95 % (05/27/20 1140) Vital Signs (24h Range):  Temp:  [97 °F (36.1 °C)-98.7 °F (37.1 °C)] 97.6 °F (36.4 °C)  Pulse:  [] 92  Resp:  [14-20] 19  SpO2:  [91 %-100 %] 95 %  BP: ()/(55-80) 119/67     Weight: 88.5 kg (195 lb 1.7 oz)  Body mass index is 28.81 kg/m².     SpO2: 95 %  O2 Device (Oxygen Therapy): room air      Intake/Output Summary (Last 24 hours) at 5/27/2020 1446  Last data filed at 5/27/2020 0600  Gross per 24 hour   Intake 2310 ml   Output 1600 ml   Net 710 ml       Lines/Drains/Airways     Peripheral Intravenous Line                 Peripheral IV - Single Lumen 05/20/20 2240 20 G Left Hand 6 days          "       Physical Exam   Constitutional: He is oriented to person, place, and time. He appears well-developed and well-nourished. No distress.   HENT:   Head: Normocephalic and atraumatic.   Eyes: Pupils are equal, round, and reactive to light. Right eye exhibits no discharge. Left eye exhibits no discharge.   Neck: Neck supple. No JVD present.   Cardiovascular: Normal rate, regular rhythm, S1 normal, S2 normal and normal heart sounds.   No murmur heard.  Pulmonary/Chest: Effort normal and breath sounds normal. No respiratory distress. He has no wheezes. He has no rales.   Abdominal: Soft. He exhibits no distension.   Musculoskeletal: He exhibits no edema.   Neurological: He is alert and oriented to person, place, and time.   Skin: Skin is warm and dry. He is not diaphoretic. No erythema.   Left brachial and radial access sites C/D/I; no bleeding erythema or drainage   Psychiatric: He has a normal mood and affect. His behavior is normal. Thought content normal.   Nursing note and vitals reviewed.      Significant Labs:   CMP   Recent Labs   Lab 05/26/20  0437 05/27/20  0621    138   K 3.8 4.2    107   CO2 21* 21*    87   BUN 31* 23   CREATININE 1.6* 1.6*   CALCIUM 9.0 8.9   ALBUMIN 2.7*  --    ANIONGAP 13 10   ESTGFRAFRICA 53* 53*   EGFRNONAA 46* 46*   , CBC   Recent Labs   Lab 05/27/20  0621   WBC 11.69   HGB 11.9*   HCT 36.7*   *   , Troponin No results for input(s): TROPONINI in the last 48 hours. and All pertinent lab results from the last 24 hours have been reviewed.    Significant Imaging: Echocardiogram: 2D echo with color flow doppler: No results found for this or any previous visit., EKG: REviewed and X-Ray: CXR: X-Ray Chest 1 View (CXR): No results found for this visit on 05/20/20. and X-Ray Chest PA and Lateral (CXR): No results found for this visit on 05/20/20.    Assessment and Plan:   Patient who presents with acute CHF/ICM/NSTEMI s/p C which showed multivessel CAD. Not  candidate for CABG, poor targets. Will optimize medical therapy and assess response. Needs LifeVest for SCD prevention.    * NSTEMI (non-ST elevated myocardial infarction)  -Troponin > 6 this AM, repeat this afternoon  -Continue ASA, Plavix, BB, statin, ACEi  -Add Imdur 30 mg daily  -Continue heparin gtt for additional day  -EF 20%  -Will need ischemic evaluation with probable LHC pending clinical course    5/25/2020  -Remains chest pain free  -Continue ASA, Plavix, BB, statin  -Hold ACEi in anticipation of LHC  -Ok to stop heparin gtt  -NPO after MN  -Reassess in AM    5/26/2020  -Stable overnight  -Continue ASA, Plavix, BB, statin  -R/LHC today. All risks benefits, and treatment alternatives explained to patient in detail. All questions answered. He has agreed to proceed    5/27/2020  -LHC showed multivessel CAD  -Not candidate for CABG due to poor targets  -Will optimize medical therapy  -Continue ASA, Plavix, BB, statin, Imdur  -Add Ranexa 500 mg BID and Losartan 25 mg daily  -Reassess in AM    CRI (chronic renal insufficiency)  -Creatinine stable at 1.6  -Continue to monitor      ETOH abuse  -Patient counseled on cessation of alcohol.  -Concerned about possible DT's, discussed with hospital medicine    PAD (peripheral artery disease)  -Continue ASA, Plavix, statin    Smoking history  -Smoking cessation      Lactic acidosis  -Normalized since admission    COPD (chronic obstructive pulmonary disease)  -Per hospital medicine, critical care mgt.    Essential hypertension  -BP improved  -Continue Coreg, ACEi  -Imdur 30 mg daily added  -Monitor    5/25/2020  -BP stable  -Continue Coreg, Imdur  -ACEi held in anticipation of R/LHC    5/27/2020  -Continue Coreg, Imdur  -Losartan added  -Monitor    Abnormal ECG  -Ischemic evaluation with LHC vs MPI stress test pending clinical course    Acute combined systolic and diastolic CHF, NYHA class 4  -Presents with acute decompensated combined CHF  -EF 20%  -Continue IV diuresis  for additional day  -Continue BB, ACEi  -Add Imdur  -Strict I's/O's  -Ischemic evaluation prior to d/c    5/25/2020  -Clinically improved  -Continue BB, po Lasix, Imdur  -Hold ACEi in anticipation of R/LHC, postponed today due to possible DT's  -Will reassess in AM    5/26/2020  -Stable overnight  -Continue BB, Lasix, Imdur  -R/LHC today    5/27/2020  -Continue BB, Lasix, Imdur  -Add Losartan 25 mg daily  -Reassess in AM  -LHC showed multivessel CAD, will need LifeVest prior to d/c for SCD prevention--->ICM        VTE Risk Mitigation (From admission, onward)         Ordered     IP VTE HIGH RISK PATIENT  Once      05/20/20 2058     Place sequential compression device  Until discontinued      05/20/20 2058                Renata Chowdhury PA-C  Cardiology  [facility]

## 2020-05-27 NOTE — PT/OT/SLP PROGRESS
Physical Therapy      Patient Name:  Ceasar Hernandez   MRN:  [id number]    EM HANSEN INITIATED THIS AM VIA CHART REVIEW, PT CURRENTLY WITH MD DISCUSSING POC, WILL ASSESS PT LATER THIS AM    Josefina Jensen, PT   5/27/2020  1015

## 2020-05-27 NOTE — ASSESSMENT & PLAN NOTE
-BP improved  -Continue Coreg, ACEi  -Imdur 30 mg daily added  -Monitor    5/25/2020  -BP stable  -Continue Coreg, Imdur  -ACEi held in anticipation of R/LHC    5/27/2020  -Continue Coreg, Imdur  -Losartan added  -Monitor

## 2020-05-27 NOTE — ASSESSMENT & PLAN NOTE
05/27/2020  ASSESSMENT: Chronic condition. Maintaining SpO2 on room air.   PLAN:  Continue present treatment plan.

## 2020-05-27 NOTE — PT/OT/SLP EVAL
Occupational Therapy   Evaluation    Name: Ceasar Hernandez  MRN: [id number]  Admitting Diagnosis:  NSTEMI (non-ST elevated myocardial infarction) 1 Day Post-Op    Recommendations:     Discharge Recommendations: home health OT, nursing facility, skilled  Discharge Equipment Recommendations:  bedside commode, bath bench, walker, rolling  Barriers to discharge:       Assessment:     Ceasar Hernandez is a 60 y.o. male with a medical diagnosis of NSTEMI (non-ST elevated myocardial infarction).  He presents with SELF CARE DEBILITY. Performance deficits affecting function: weakness, impaired endurance, impaired self care skills, impaired balance, gait instability, impaired functional mobilty, decreased coordination, decreased upper extremity function, decreased lower extremity function, pain, decreased safety awareness, impaired coordination, impaired fine motor, edema.      Rehab Prognosis: Good; patient would benefit from acute skilled OT services to address these deficits and reach maximum level of function.       Plan:     Patient to be seen 3 x/week to address the above listed problems via self-care/home management, therapeutic activities, therapeutic exercises  · Plan of Care Expires: 06/03/20  · Plan of Care Reviewed with: patient    Subjective     Chief Complaint: PAIN DUE TO GOUT  Patient/Family Comments/goals: NONE STATED    Occupational Profile:  Living Environment: PATIENT LIVES IN 1-STORY HOUSE WITH NO STEPS WITH SISTER.  Previous level of function: PATIENT STATED HE WAS (I) WITH ALL LEVELS OF SELF CARE.  Roles and Routines:  PATIENT STATED HE WAS (I) WITH ALL LEVELS OF SELF CARE.  Equipment Used at Home:  none  Assistance upon Discharge: SISTER    Pain/Comfort:  · Pain Rating 1: 9/10  · Location - Side 1: Right  · Location - Orientation 1: generalized  · Location 1: hand(PATIENT C/O GOUT)  · Pain Addressed 1: Reposition, Distraction, Cessation of Activity    Patients cultural, spiritual, Jainism conflicts  given the current situation: no    Objective:     Communicated with: NURSE prior to session.  Patient found supine with telemetry, peripheral IV upon OT entry to room.    General Precautions: Standard, fall   Orthopedic Precautions:N/A   Braces: N/A     Occupational Performance:    Bed Mobility:    · Patient completed Rolling/Turning to Right with minimum assistance  · Patient completed Scooting/Bridging with stand by assistance  · Patient completed Supine to Sit with maximal assistance and 2 persons  · Patient completed Sit to Supine with stand by assistance    Functional Mobility/Transfers:  · PATIENT C/O INABLITY TO STAND THIS DATE WITH C/O TIRED AND GOUT PAIN.  ·     Activities of Daily Living:  · PATIENT WOULD NOT ATTEMPT ADL'S WITH C/O TIRED AND GOUT PAIN.    Cognitive/Visual Perceptual:  APPEARS INTACT    Physical Exam:  Balance:    -       (S) SEATED EOB.  Upper Extremity Range of Motion:     -       Right Upper Extremity: PATIENT WITH DECREASED AROM ALL PLANES WITH DECREASED (B) GROSS GRASP.  -       Left Upper Extremity: PATIENT WITH DECREASED AROM ALL PLANES WITH DECREASED (B) GROSS GRASP    AMPAC 6 Click ADL:  AMPAC Total Score: 7    Treatment & Education:  OT EVAL COMPLETED TODAY.  PATIENT WAS EDUCATED RE: PURPOSE OF OT.  ADL'S ATTEMPTED THIS DATE WITH PATIENT REFUSAL DUE TO C/O TIRED AND GOUT.  Education:    Patient left supine with all lines intact, call button in reach and bed alarm on    GOALS:   Multidisciplinary Problems     Occupational Therapy Goals        Problem: Occupational Therapy Goal    Goal Priority Disciplines Outcome Interventions   Occupational Therapy Goal     OT, PT/OT Ongoing, Progressing    Description:  GOALS TO BE MET BY 6/3/2020:    1)  PATIENT WILL BE MIN (A) WITH SUPINE <> SIT.  2)  PATIENT WILL DON/DOFF LB CLOTHING WITH MIN (A).  3)  PATIENT WILL DON/DOFF UB CLOTHING WITH SBA.  4)  PATIENT WILL PERFORM X10 REPS OF BUE SROM, AAROM, AROM TO INCREASE FUNC USE OF BUE'S.                     History:     Past Medical History:   Diagnosis Date    COPD (chronic obstructive pulmonary disease)     Gout     Hypertension     NSTEMI (non-ST elevated myocardial infarction) 5/20/2020       Past Surgical History:   Procedure Laterality Date    CATHETERIZATION OF BOTH LEFT AND RIGHT HEART N/A 5/26/2020    Procedure: CATHETERIZATION, HEART, BOTH LEFT AND RIGHT;  Surgeon: Olga Correia MD;  Location: [facility] CATH LAB;  Service: Cardiology;  Laterality: N/A;    FRACTURE SURGERY         Time Tracking:     OT Date of Treatment: 05/27/20  OT Start Time: 1034  OT Stop Time: 1047  OT Total Time (min): 13 min    Billable Minutes:Evaluation 13    Rachelle Frey OT  5/27/2020

## 2020-05-27 NOTE — ASSESSMENT & PLAN NOTE
05/27/2020  ASSESSMENT: Anxiety about health, exacerbated by smoking cessation and insomnia. [facility] Controlled Prescription Drug Monitoring database was queried and showed no activity to suggest abuse, diversion, or other inappropriate use of prescription medications.  PLAN:  Alprazolam PRN.

## 2020-05-27 NOTE — ASSESSMENT & PLAN NOTE
05/27/2020  ASSESSMENT: Controlled.   PLAN:  Continue present treatment plan.  Temp:  [97 °F (36.1 °C)-98.7 °F (37.1 °C)] 97.6 °F (36.4 °C)  Pulse:  [] 92  Resp:  [14-20] 19  SpO2:  [91 %-100 %] 95 %  BP: ()/(55-80) 119/67

## 2020-05-27 NOTE — PLAN OF CARE
PATIENT WOULD BENEFIT FROM CONT SKILLED OT INTERVENTION TO INCREASE SAFETY AND (I) WITH ALL LEVELS OF SELF CARE.

## 2020-05-27 NOTE — SIGNIFICANT EVENT
Received a deterioration alert on this patient. The patient was seen and examined at bedside. Vital signs are stable. The patient is in no apparent distress. No indication for transfer to the ICU at this time.

## 2020-05-27 NOTE — HPI
"05/26/2020  The patient is a 60 year old male with severe multivessel coronary artery disease with an EF of 15% who was worked up in the [facility] ED for NSTEMI and acute respiratory failure. The patient has COPD, HTN, acute combined systolic and diastolic CHF, PAD, anxiety, gout, current everyday smoker 1 ppd x 40 years, +ETOH 1 pint of beer per day. The patient reports that he was in his usual state of health up to 1 week ago when he began having acute severe SOB, wheezing, WINSTON, cough, fatigue, dizziness, + orthopnea, and weakness which progressively worsened so he called the ambulance and was taken to [facility] ED on 05/20/2020. The patient denies chest pain, back pain, jaw pain, shoulder pain. The patient reports that over the past 15 years he has lived a sedentary life because of his SOB and WINSTON. Patient reports that he is unable to walk more than 25 to 50 feet at a time due to SOB. No PCP. Reports he "was on medication but he ran out, and he never got any refills". ED workup showed NSTEMI, trop 1.180 > 6.890, BNP 1194, elevated lactic acid, EKG inferolateral st-t abnormality, and /109. LHC was delayed due to symptoms of DTs, chronic ETOH use. Patient having tremors during exam. LHC performed today by Dr. Correia. Patient reports an active gout flair up localized to the right foot and right hand. Denies chest pain, palpitations, fever, syncope, falls, abdominal pain, constipation, diarrhea, and edema.    "

## 2020-05-27 NOTE — ASSESSMENT & PLAN NOTE
05/27/2020  ASSESSMENT: RESOLVED.  Recent Labs   Lab 05/20/20  2240 05/21/20  1151   LACTATE 2.3* 1.8

## 2020-05-27 NOTE — ASSESSMENT & PLAN NOTE
05/26/2020  The patient is a 60 year old male with severe multivessel coronary artery disease with an EF of 15% who was worked up in the [facility] ED for NSTEMI and acute respiratory failure. The patient has COPD, HTN, acute combined systolic and diastolic CHF, PAD, anxiety, gout, current everyday smoker 1 ppd x 40 years, +ETOH 1 pint of beer per day. The patient with severe multivessel coronary artery disease with an EF of 15% is not a candidate for coronary artery bypass grafting due to poor coronary targets.

## 2020-05-27 NOTE — ASSESSMENT & PLAN NOTE
05/27/2020  ASSESSMENT: No infectious source. Could be reaction to corticosteroids or stress.   PLAN:  Monitor.    Recent Labs   Lab 05/20/20  2240 05/21/20  0355 05/22/20  0707 05/23/20  0524 05/24/20  0519 05/25/20  0428 05/27/20  0621   WBC 13.80* 16.00* 13.65* 11.01 10.89 11.46 11.69     Microbiology Results (last 7 days)     Procedure Component Value Units Date/Time    Blood culture x two cultures. Draw prior to antibiotics. [[id number]] Collected:  05/20/20 0811    Order Status:  Completed Specimen:  Blood from Peripheral, Antecubital, Left Updated:  05/25/20 1612     Blood Culture, Routine No growth after 5 days.    Narrative:       Aerobic and anaerobic    Blood culture x two cultures. Draw prior to antibiotics. [[id number]] Collected:  05/20/20 0814    Order Status:  Completed Specimen:  Blood from Peripheral, Antecubital, Right Updated:  05/25/20 1612     Blood Culture, Routine No growth after 5 days.    Narrative:       Aerobic and anaerobic         Antibiotics (From admission, onward)    None

## 2020-05-27 NOTE — ASSESSMENT & PLAN NOTE
5/24-On aspirin, plavix  Coreg,lipitor, heparin drip. Flora score 130. Cards planning [facility]. Hold ACEI due to worsening kidney functions   5/25- LHC postponed by Cardiology due to concern over tremor . Will start Librium   5/26- [facility] today . Further plan pending  [facility]  5/27- C revealed severe 3 vessels CAD. CT Surgery consulted and pt deemed  not a candidate for coronary artery bypass grafting due to poor coronary targets. Cardiology to optimize medical therapy

## 2020-05-28 ENCOUNTER — TELEPHONE (OUTPATIENT)
Dept: CARDIOLOGY | Facility: HOSPITAL | Age: 60
End: 2020-05-28

## 2020-05-28 LAB
ANION GAP SERPL CALC-SCNC: 11 MMOL/L (ref 8–16)
BUN SERPL-MCNC: 29 MG/DL (ref 8–23)
CALCIUM SERPL-MCNC: 9.1 MG/DL (ref 8.7–10.5)
CHLORIDE SERPL-SCNC: 107 MMOL/L (ref 95–110)
CO2 SERPL-SCNC: 22 MMOL/L (ref 23–29)
CREAT SERPL-MCNC: 1.9 MG/DL (ref 0.5–1.4)
EST. GFR  (AFRICAN AMERICAN): 43 ML/MIN/1.73 M^2
EST. GFR  (NON AFRICAN AMERICAN): 37 ML/MIN/1.73 M^2
GLUCOSE SERPL-MCNC: 114 MG/DL (ref 70–110)
POTASSIUM SERPL-SCNC: 3.6 MMOL/L (ref 3.5–5.1)
SODIUM SERPL-SCNC: 140 MMOL/L (ref 136–145)

## 2020-05-28 PROCEDURE — 97530 THERAPEUTIC ACTIVITIES: CPT

## 2020-05-28 PROCEDURE — 25000003 PHARM REV CODE 250: Performed by: INTERNAL MEDICINE

## 2020-05-28 PROCEDURE — 25000003 PHARM REV CODE 250: Performed by: PHYSICIAN ASSISTANT

## 2020-05-28 PROCEDURE — S4991 NICOTINE PATCH NONLEGEND: HCPCS | Performed by: INTERNAL MEDICINE

## 2020-05-28 PROCEDURE — 36415 COLL VENOUS BLD VENIPUNCTURE: CPT

## 2020-05-28 PROCEDURE — 99232 SBSQ HOSP IP/OBS MODERATE 35: CPT | Mod: ,,, | Performed by: INTERNAL MEDICINE

## 2020-05-28 PROCEDURE — 94761 N-INVAS EAR/PLS OXIMETRY MLT: CPT

## 2020-05-28 PROCEDURE — 21400001 HC TELEMETRY ROOM

## 2020-05-28 PROCEDURE — 99232 PR SUBSEQUENT HOSPITAL CARE,LEVL II: ICD-10-PCS | Mod: ,,, | Performed by: INTERNAL MEDICINE

## 2020-05-28 PROCEDURE — 80048 BASIC METABOLIC PNL TOTAL CA: CPT

## 2020-05-28 PROCEDURE — 63600175 PHARM REV CODE 636 W HCPCS: Performed by: INTERNAL MEDICINE

## 2020-05-28 RX ORDER — RANOLAZINE 500 MG/1
500 TABLET, EXTENDED RELEASE ORAL DAILY
Status: DISCONTINUED | OUTPATIENT
Start: 2020-05-29 | End: 2020-05-29 | Stop reason: HOSPADM

## 2020-05-28 RX ORDER — ENOXAPARIN SODIUM 100 MG/ML
40 INJECTION SUBCUTANEOUS EVERY 24 HOURS
Status: DISCONTINUED | OUTPATIENT
Start: 2020-05-28 | End: 2020-05-29 | Stop reason: HOSPADM

## 2020-05-28 RX ORDER — THIAMINE HCL 100 MG
100 TABLET ORAL DAILY
Status: DISCONTINUED | OUTPATIENT
Start: 2020-05-28 | End: 2020-05-29 | Stop reason: HOSPADM

## 2020-05-28 RX ORDER — IBUPROFEN 200 MG
1 TABLET ORAL DAILY
Status: DISCONTINUED | OUTPATIENT
Start: 2020-05-28 | End: 2020-05-29 | Stop reason: HOSPADM

## 2020-05-28 RX ADMIN — LOSARTAN POTASSIUM 25 MG: 25 TABLET, FILM COATED ORAL at 08:05

## 2020-05-28 RX ADMIN — CHLORDIAZEPOXIDE HYDROCHLORIDE 10 MG: 10 CAPSULE ORAL at 09:05

## 2020-05-28 RX ADMIN — ALPRAZOLAM 0.5 MG: 0.5 TABLET ORAL at 09:05

## 2020-05-28 RX ADMIN — NICOTINE 1 PATCH: 14 PATCH TRANSDERMAL at 02:05

## 2020-05-28 RX ADMIN — CHLORDIAZEPOXIDE HYDROCHLORIDE 10 MG: 10 CAPSULE ORAL at 08:05

## 2020-05-28 RX ADMIN — FUROSEMIDE 20 MG: 20 TABLET ORAL at 08:05

## 2020-05-28 RX ADMIN — CARVEDILOL 3.12 MG: 3.12 TABLET, FILM COATED ORAL at 09:05

## 2020-05-28 RX ADMIN — CARVEDILOL 3.12 MG: 3.12 TABLET, FILM COATED ORAL at 08:05

## 2020-05-28 RX ADMIN — Medication 100 MG: at 05:05

## 2020-05-28 RX ADMIN — Medication 1 PATCH: at 08:05

## 2020-05-28 RX ADMIN — ENOXAPARIN SODIUM 40 MG: 100 INJECTION SUBCUTANEOUS at 05:05

## 2020-05-28 RX ADMIN — CLOPIDOGREL BISULFATE 75 MG: 75 TABLET ORAL at 08:05

## 2020-05-28 RX ADMIN — RANOLAZINE 500 MG: 500 TABLET, FILM COATED, EXTENDED RELEASE ORAL at 08:05

## 2020-05-28 RX ADMIN — ASPIRIN 81 MG: 81 TABLET, COATED ORAL at 08:05

## 2020-05-28 RX ADMIN — ATORVASTATIN CALCIUM 80 MG: 40 TABLET, FILM COATED ORAL at 09:05

## 2020-05-28 RX ADMIN — ISOSORBIDE MONONITRATE 30 MG: 30 TABLET, EXTENDED RELEASE ORAL at 08:05

## 2020-05-28 NOTE — PLAN OF CARE
Pt remains fall free this shift.  Pt AAOx4, verbal, clear speech.  Skin warm and dry. No new skin issues.  Telemonitoring in progress, 90s SR  Room air  Refused BiPAP tonight.   Urinal   Assist x2 with transfers.   Bed low, side rails up x 2, wheels locked, call light in reach.   Bed alarm maintained for safety.   Patient instructed to call for assistance.   Hourly rounding completed.   24 hour chart check completed  POC updated and reviewed with pt. Will continue POC.

## 2020-05-28 NOTE — ASSESSMENT & PLAN NOTE
05/28/2020  ASSESSMENT: No infectious source. Could be reaction to corticosteroids or stress.   PLAN:  Monitor.    Recent Labs   Lab 05/22/20  0707 05/23/20  0524 05/24/20  0519 05/25/20  0428 05/27/20  0621   WBC 13.65* 11.01 10.89 11.46 11.69     Microbiology Results (last 7 days)     Procedure Component Value Units Date/Time    Blood culture x two cultures. Draw prior to antibiotics. [[id number]] Collected:  05/20/20 0811    Order Status:  Completed Specimen:  Blood from Peripheral, Antecubital, Left Updated:  05/25/20 1612     Blood Culture, Routine No growth after 5 days.    Narrative:       Aerobic and anaerobic    Blood culture x two cultures. Draw prior to antibiotics. [[id number]] Collected:  05/20/20 0814    Order Status:  Completed Specimen:  Blood from Peripheral, Antecubital, Right Updated:  05/25/20 1612     Blood Culture, Routine No growth after 5 days.    Narrative:       Aerobic and anaerobic         Antibiotics (From admission, onward)    None

## 2020-05-28 NOTE — ASSESSMENT & PLAN NOTE
5/24-On aspirin, plavix  Coreg,lipitor, heparin drip. Flora score 130. Cards planning [facility]. Hold ACEI due to worsening kidney functions   5/25- LHC postponed by Cardiology due to concern over tremor . Will start Librium   5/26- C today . Further plan pending  [facility]  5/27- C revealed severe 3 vessels CAD. CT Surgery consulted and pt deemed  not a candidate for coronary artery bypass grafting due to poor coronary targets. Cardiology to optimize medical therapy   5/28- Life Vest before discharge

## 2020-05-28 NOTE — SUBJECTIVE & OBJECTIVE
Review of Systems   Constitution: Positive for malaise/fatigue.   HENT: Negative.    Eyes: Negative.    Cardiovascular: Negative.    Respiratory: Negative.    Endocrine: Negative.    Hematologic/Lymphatic: Negative.    Skin: Negative.    Musculoskeletal: Positive for arthritis and joint pain.   Gastrointestinal: Negative.    Genitourinary: Negative.    Neurological: Negative.    Psychiatric/Behavioral: Negative.    Allergic/Immunologic: Negative.      Objective:     Vital Signs (Most Recent):  Temp: 98 °F (36.7 °C) (05/28/20 1109)  Pulse: 95 (05/28/20 1109)  Resp: 18 (05/28/20 1109)  BP: 115/67 (05/28/20 1109)  SpO2: 95 % (05/28/20 1109) Vital Signs (24h Range):  Temp:  [97.4 °F (36.3 °C)-99.6 °F (37.6 °C)] 98 °F (36.7 °C)  Pulse:  [90-98] 95  Resp:  [17-20] 18  SpO2:  [94 %-97 %] 95 %  BP: (105-120)/(60-72) 115/67     Weight: 89 kg (196 lb 3.4 oz)  Body mass index is 28.98 kg/m².     SpO2: 95 %  O2 Device (Oxygen Therapy): room air      Intake/Output Summary (Last 24 hours) at 5/28/2020 1436  Last data filed at 5/28/2020 0600  Gross per 24 hour   Intake 480 ml   Output 1025 ml   Net -545 ml       Lines/Drains/Airways     Peripheral Intravenous Line                 Peripheral IV - Single Lumen 05/20/20 2240 20 G Left Hand 7 days                Physical Exam   Constitutional: He is oriented to person, place, and time. He appears well-developed and well-nourished. No distress.   HENT:   Head: Normocephalic and atraumatic.   Eyes: Pupils are equal, round, and reactive to light. Right eye exhibits no discharge. Left eye exhibits no discharge.   Neck: Neck supple. No JVD present.   Cardiovascular: Normal rate, regular rhythm, S1 normal, S2 normal and normal heart sounds.   No murmur heard.  Pulmonary/Chest: Effort normal and breath sounds normal. No respiratory distress. He has no wheezes. He has no rales.   Abdominal: Soft. He exhibits no distension.   Musculoskeletal: He exhibits no edema.   Neurological: He is alert  and oriented to person, place, and time.   Skin: Skin is warm and dry. He is not diaphoretic. No erythema.   Psychiatric: He has a normal mood and affect. His behavior is normal. Thought content normal.   Nursing note and vitals reviewed.      Significant Labs:   CMP   Recent Labs   Lab 05/27/20  0621 05/28/20  0425    140   K 4.2 3.6    107   CO2 21* 22*   GLU 87 114*   BUN 23 29*   CREATININE 1.6* 1.9*   CALCIUM 8.9 9.1   ANIONGAP 10 11   ESTGFRAFRICA 53* 43*   EGFRNONAA 46* 37*   , CBC   Recent Labs   Lab 05/27/20  0621   WBC 11.69   HGB 11.9*   HCT 36.7*   *   , Troponin No results for input(s): TROPONINI in the last 48 hours. and All pertinent lab results from the last 24 hours have been reviewed.    Significant Imaging: Echocardiogram: 2D echo with color flow doppler: No results found for this or any previous visit., EKG: Reviewed and X-Ray: CXR: X-Ray Chest 1 View (CXR): No results found for this visit on 05/20/20. and X-Ray Chest PA and Lateral (CXR): No results found for this visit on 05/20/20.

## 2020-05-28 NOTE — ASSESSMENT & PLAN NOTE
Currently Euvolemic. IV lasix changed to PO lasix .   5/25- Lasix on hold and started on gentle hydration for possible LHC.   5/26- LHC today   5/27- LHC revealed severe 3 vessels CAD. CT Surgery consulted and pt deemed  not a candidate for coronary artery bypass grafting due to poor coronary targets. Cardiology to optimize medical therapy   5/28- Life Vest before discharge

## 2020-05-28 NOTE — TELEPHONE ENCOUNTER
I attempted to call the patient to let him know that his hospital follow up is scheduled for 6/12/20 with Margie Escalera for 1:30 pm at [facility]

## 2020-05-28 NOTE — CONSULTS
CM met with patient at the bedside to discuss the discharge plan.  Patient states that he has support at home and does not have any other needs.  CM inquired about SNF and medical equipment and declined the need.  He stated that he has a walker at home if it is needed.  Patient will call his PCP to arrange a follow up appointment.

## 2020-05-28 NOTE — ASSESSMENT & PLAN NOTE
05/28/2020  ASSESSMENT: Chronic condition. Maintaining SpO2 on room air.   PLAN:  Continue present treatment plan.

## 2020-05-28 NOTE — SUBJECTIVE & OBJECTIVE
Interval History:   Not a candidate for coronary artery bypass grafting due to poor coronary targets. Optimize medical therapy. Awaiting Life vest.     Review of Systems   Constitutional: Negative for activity change, appetite change and fever.   HENT: Negative for sore throat.    Eyes: Negative for visual disturbance.   Respiratory: Negative for cough, chest tightness and shortness of breath.    Cardiovascular: Negative for chest pain, palpitations and leg swelling.   Gastrointestinal: Negative for abdominal distention, abdominal pain, constipation, diarrhea, nausea and vomiting.        Loose stools    Endocrine: Negative for polyuria.   Genitourinary: Negative for decreased urine volume, dysuria, flank pain, frequency and hematuria.   Musculoskeletal: Negative for back pain and gait problem.   Skin: Negative for rash.   Neurological: Negative for syncope, speech difficulty, weakness, light-headedness and headaches.   Psychiatric/Behavioral: Negative for confusion, hallucinations and sleep disturbance.     Objective:     Vital Signs (Most Recent):  Temp: 98 °F (36.7 °C) (05/28/20 1109)  Pulse: 95 (05/28/20 1109)  Resp: 18 (05/28/20 1109)  BP: 115/67 (05/28/20 1109)  SpO2: 95 % (05/28/20 1109) Vital Signs (24h Range):  Temp:  [97.4 °F (36.3 °C)-99.6 °F (37.6 °C)] 98 °F (36.7 °C)  Pulse:  [90-98] 95  Resp:  [17-20] 18  SpO2:  [94 %-97 %] 95 %  BP: (105-120)/(60-72) 115/67     Weight: 89 kg (196 lb 3.4 oz)  Body mass index is 28.98 kg/m².    Intake/Output Summary (Last 24 hours) at 5/28/2020 1350  Last data filed at 5/28/2020 0600  Gross per 24 hour   Intake 480 ml   Output 1025 ml   Net -545 ml      Physical Exam   Constitutional: He is oriented to person, place, and time. He appears well-developed and well-nourished. No distress.   HENT:   Head: Normocephalic and atraumatic.   Mouth/Throat: No oropharyngeal exudate.   Eyes: Pupils are equal, round, and reactive to light. Conjunctivae and EOM are normal.   Neck:  Normal range of motion. Neck supple. No JVD present. No thyromegaly present.   Cardiovascular: Normal rate, regular rhythm and normal heart sounds.   No murmur heard.  Pulmonary/Chest: Effort normal and breath sounds normal. No respiratory distress. He has no wheezes. He has no rales. He exhibits no tenderness.   Abdominal: Soft. Bowel sounds are normal. He exhibits no distension. There is no tenderness. There is no rebound and no guarding.   Musculoskeletal: Normal range of motion. He exhibits no edema.   Lymphadenopathy:     He has no cervical adenopathy.   Neurological: He is alert and oriented to person, place, and time. He displays normal reflexes. No cranial nerve deficit or sensory deficit.   Skin: Skin is warm and dry. No rash noted. He is not diaphoretic.   Psychiatric: He has a normal mood and affect.       Significant Labs:   CBC:   Recent Labs   Lab 05/27/20  0621   WBC 11.69   HGB 11.9*   HCT 36.7*   *     CMP:   Recent Labs   Lab 05/27/20  0621 05/28/20  0425    140   K 4.2 3.6    107   CO2 21* 22*   GLU 87 114*   BUN 23 29*   CREATININE 1.6* 1.9*   CALCIUM 8.9 9.1   ANIONGAP 10 11   EGFRNONAA 46* 37*       Significant Imaging:

## 2020-05-28 NOTE — ASSESSMENT & PLAN NOTE
-Troponin > 6 this AM, repeat this afternoon  -Continue ASA, Plavix, BB, statin, ACEi  -Add Imdur 30 mg daily  -Continue heparin gtt for additional day  -EF 20%  -Will need ischemic evaluation with probable LHC pending clinical course    5/25/2020  -Remains chest pain free  -Continue ASA, Plavix, BB, statin  -Hold ACEi in anticipation of LHC  -Ok to stop heparin gtt  -NPO after MN  -Reassess in AM    5/26/2020  -Stable overnight  -Continue ASA, Plavix, BB, statin  -R/LHC today. All risks benefits, and treatment alternatives explained to patient in detail. All questions answered. He has agreed to proceed    5/27/2020  -LHC showed multivessel CAD  -Not candidate for CABG due to poor targets  -Will optimize medical therapy  -Continue ASA, Plavix, BB, statin, Imdur  -Add Ranexa 500 mg BID and Losartan 25 mg daily  -Reassess in AM    5/28/2020  -Continue OMT  -Reduce Ranexa to 500 mg daily  -LifeVest

## 2020-05-28 NOTE — ASSESSMENT & PLAN NOTE
05/28/2020  ASSESSMENT: Controlled.   PLAN:  Continue present treatment plan.  Temp:  [97.4 °F (36.3 °C)-99.6 °F (37.6 °C)] 98 °F (36.7 °C)  Pulse:  [90-98] 95  Resp:  [17-20] 18  SpO2:  [94 %-97 %] 95 %  BP: (105-120)/(60-72) 115/67

## 2020-05-28 NOTE — ASSESSMENT & PLAN NOTE
05/28/2020  ASSESSMENT: He is currently at the action (quitting) stage of smoking cessation. Smoking cessation encouraged.    PLAN:  Continue nicotine patch. Referral to tobacco cessation program after discharge.

## 2020-05-28 NOTE — PROGRESS NOTES
[facility] Hospital Medicine  Progress Note    Patient Name: Ceasar Hernandez  MRN: [id number]  Patient Class: IP- Inpatient   Admission Date: 5/20/2020  Length of Stay: 8 days  Attending Physician: Timothy Hunter MD  Primary Care Provider: Provider [facility]        Subjective:     Principal Problem:NSTEMI (non-ST elevated myocardial infarction)        HPI:  Pt is a 61 yo male with PMhx of COPD and HTN who was admitted to ICU in early AM of 5/21/2020 for Acute Respiratory Failure requiring BIPAP and decompensated combined heart failure. Pt reported WINSTON and lightheadedness. COVID 19 negative. Troponins were elevated 1.180 > 2.686 > 2.573 and Cardiology recommended heparin infusion. Pt was in acute CHF exacerbation and 2 d ECHO showed LVEF 20 % and diastolic dysfunction. Diuresis with IV lasix given. Initially, pt required supplemental oxygen via NIPPV which has thus been weaned. Also, pt received Plavix, BB, ACE inhibitor, ASA and STATIN. Pt's respiratory status improved and he was downgraded to Telemetry unit.     Overview/Hospital Course:  05/22/2020 Admitted 05/20/2020 to ICU for NSTEMI and Acute Combined Systolic and Diastolic heart failure. ECHO showed EF 20% and severe diastolic dysfunction. He stabilized and was transferred out of ICU to telemetry. Cardiology following. CV Meds include carvedilol, lisinopril, furosemide, isosorbide mononitrate, atorvastatin, and clopidogrel, aspirin. Symptoms MUCH improved. No present chest pain. Breathing much more comfortably. Good diuresis. Anticipating cardiac catheterization when more stable, probably Monday.    5/25/20- [facility] postponed by Cardiology this  morning due to concern over tremors  or shakes . Last drink was 5 days ago. Pt denies any H/O delirium tremens or alcohol withdrawal  in the past , states he can go days without drinking. States he was shaking due to room being too cold. Vitals are stable . Pt is awake , alert and oriented x 3 and  situation. Labs resulted Na 140, K 3.8, BUN 36, Creatinine 2.0.     5/26- Pt has no C/O today . Tremor or shakes are better . Creatinine is down to 1.6 . Vitals are stable . [facility] today     5/27- S/P LHC/RHC yesterday . Noted severe 3 vessels CAD and CT Surgery was consulted . After careful evaluation CT surgery suggested that the patient with severe multivessel coronary artery disease with an EF of 15 to 20% % is not a candidate for coronary artery bypass grafting due to poor coronary targets. Cardiology will optimize medical therapy. Consult PT/OT before planning discharge.     5/28- Afebrile . No c/o today . Feels better overall . Vitals are stable . On RA. C/O loose stools  but wants to think it is due to chocolate milk. Denies chest pain, N/V , diaphoresis , abd pain. Labs resulted Na 140, K 3.6, creatinine increased to 1.9 from 1.6. Ranexa added to medication regimen . Awaiting Life Vest.     Interval History:   Not a candidate for coronary artery bypass grafting due to poor coronary targets. Optimize medical therapy. Awaiting Life vest.     Review of Systems   Constitutional: Negative for activity change, appetite change and fever.   HENT: Negative for sore throat.    Eyes: Negative for visual disturbance.   Respiratory: Negative for cough, chest tightness and shortness of breath.    Cardiovascular: Negative for chest pain, palpitations and leg swelling.   Gastrointestinal: Negative for abdominal distention, abdominal pain, constipation, diarrhea, nausea and vomiting.        Loose stools    Endocrine: Negative for polyuria.   Genitourinary: Negative for decreased urine volume, dysuria, flank pain, frequency and hematuria.   Musculoskeletal: Negative for back pain and gait problem.   Skin: Negative for rash.   Neurological: Negative for syncope, speech difficulty, weakness, light-headedness and headaches.   Psychiatric/Behavioral: Negative for confusion, hallucinations and sleep disturbance.     Objective:      Vital Signs (Most Recent):  Temp: 98 °F (36.7 °C) (05/28/20 1109)  Pulse: 95 (05/28/20 1109)  Resp: 18 (05/28/20 1109)  BP: 115/67 (05/28/20 1109)  SpO2: 95 % (05/28/20 1109) Vital Signs (24h Range):  Temp:  [97.4 °F (36.3 °C)-99.6 °F (37.6 °C)] 98 °F (36.7 °C)  Pulse:  [90-98] 95  Resp:  [17-20] 18  SpO2:  [94 %-97 %] 95 %  BP: (105-120)/(60-72) 115/67     Weight: 89 kg (196 lb 3.4 oz)  Body mass index is 28.98 kg/m².    Intake/Output Summary (Last 24 hours) at 5/28/2020 1350  Last data filed at 5/28/2020 0600  Gross per 24 hour   Intake 480 ml   Output 1025 ml   Net -545 ml      Physical Exam   Constitutional: He is oriented to person, place, and time. He appears well-developed and well-nourished. No distress.   HENT:   Head: Normocephalic and atraumatic.   Mouth/Throat: No oropharyngeal exudate.   Eyes: Pupils are equal, round, and reactive to light. Conjunctivae and EOM are normal.   Neck: Normal range of motion. Neck supple. No JVD present. No thyromegaly present.   Cardiovascular: Normal rate, regular rhythm and normal heart sounds.   No murmur heard.  Pulmonary/Chest: Effort normal and breath sounds normal. No respiratory distress. He has no wheezes. He has no rales. He exhibits no tenderness.   Abdominal: Soft. Bowel sounds are normal. He exhibits no distension. There is no tenderness. There is no rebound and no guarding.   Musculoskeletal: Normal range of motion. He exhibits no edema.   Lymphadenopathy:     He has no cervical adenopathy.   Neurological: He is alert and oriented to person, place, and time. He displays normal reflexes. No cranial nerve deficit or sensory deficit.   Skin: Skin is warm and dry. No rash noted. He is not diaphoretic.   Psychiatric: He has a normal mood and affect.       Significant Labs:   CBC:   Recent Labs   Lab 05/27/20  0621   WBC 11.69   HGB 11.9*   HCT 36.7*   *     CMP:   Recent Labs   Lab 05/27/20 0621 05/28/20  0425    140   K 4.2 3.6    107   CO2  21* 22*   GLU 87 114*   BUN 23 29*   CREATININE 1.6* 1.9*   CALCIUM 8.9 9.1   ANIONGAP 10 11   EGFRNONAA 46* 37*       Significant Imaging:       Assessment/Plan:      * NSTEMI (non-ST elevated myocardial infarction)  5/24-On aspirin, plavix  Coreg,lipitor, heparin drip. Flora score 130. Cards planning C. Hold ACEI due to worsening kidney functions   5/25- LHC postponed by Cardiology due to concern over tremor . Will start Librium   5/26- C today . Further plan pending  LHC  5/27- LHC revealed severe 3 vessels CAD. CT Surgery consulted and pt deemed  not a candidate for coronary artery bypass grafting due to poor coronary targets. Cardiology to optimize medical therapy   5/28- Life Vest before discharge     Acute combined systolic and diastolic CHF, NYHA class 4, EF 20%   Currently Euvolemic. IV lasix changed to PO lasix .   5/25- Lasix on hold and started on gentle hydration for possible LHC.   5/26- LH today   5/27- [facility] revealed severe 3 vessels CAD. CT Surgery consulted and pt deemed  not a candidate for coronary artery bypass grafting due to poor coronary targets. Cardiology to optimize medical therapy   5/28- Life Vest before discharge     Respiratory distress, acute  05/28/2020 RESOLVED.    CRI (chronic renal insufficiency)  - Baseline creatinine is unknown .   - Stable creatinine since admission at 1.9 to 2.1   -Renal indices remains stable       ETOH abuse  - Pt denies dependency and any h/o withdrawal in the past   -Last drink on 5/20/20   -Stat Librium       COPD (chronic obstructive pulmonary disease)  05/28/2020  ASSESSMENT: Chronic condition. Maintaining SpO2 on room air.   PLAN:  Continue present treatment plan.        Panlobular emphysema  - Stable   -Bronchodilator inhalational treatment as needed       PAD (peripheral artery disease)        Anxiety  05/27/2020  ASSESSMENT: Anxiety about health, exacerbated by smoking cessation and insomnia. [place] Board of Pharm[employer] Controlled Prescription  Drug Monitoring database was queried and showed no activity to suggest abuse, diversion, or other inappropriate use of prescription medications.  PLAN:  Alprazolam PRN.        Leukocytosis  05/28/2020  ASSESSMENT: No infectious source. Could be reaction to corticosteroids or stress.   PLAN:  Monitor.    Recent Labs   Lab 05/22/20  0707 05/23/20  0524 05/24/20  0519 05/25/20  0428 05/27/20  0621   WBC 13.65* 11.01 10.89 11.46 11.69     Microbiology Results (last 7 days)     Procedure Component Value Units Date/Time    Blood culture x two cultures. Draw prior to antibiotics. [[id number]] Collected:  05/20/20 0811    Order Status:  Completed Specimen:  Blood from Peripheral, Antecubital, Left Updated:  05/25/20 1612     Blood Culture, Routine No growth after 5 days.    Narrative:       Aerobic and anaerobic    Blood culture x two cultures. Draw prior to antibiotics. [[id number]] Collected:  05/20/20 0814    Order Status:  Completed Specimen:  Blood from Peripheral, Antecubital, Right Updated:  05/25/20 1612     Blood Culture, Routine No growth after 5 days.    Narrative:       Aerobic and anaerobic         Antibiotics (From admission, onward)    None           Smoking history  05/28/2020  ASSESSMENT: He is currently at the action (quitting) stage of smoking cessation. Smoking cessation encouraged.    PLAN:  Continue nicotine patch. Referral to tobacco cessation program after discharge.      Lactic acidosis  05/28/2020  ASSESSMENT: RESOLVED.  No results for input(s): LACTATE in the last 168 hours.     Essential hypertension  05/28/2020  ASSESSMENT: Controlled.   PLAN:  Continue present treatment plan.  Temp:  [97.4 °F (36.3 °C)-99.6 °F (37.6 °C)] 98 °F (36.7 °C)  Pulse:  [90-98] 95  Resp:  [17-20] 18  SpO2:  [94 %-97 %] 95 %  BP: (105-120)/(60-72) 115/67        Abnormal ECG          VTE Risk Mitigation (From admission, onward)         Ordered     enoxaparin injection 40 mg  Daily      05/28/20 1345     IP VTE HIGH RISK  PATIENT  Once      05/20/20 2058     Place sequential compression device  Until discontinued      05/20/20 2058                      Timothy Hunter MD  Department of Hospital Medicine   [facility]

## 2020-05-28 NOTE — CONSULTS
CM received a consult for a Lifevest.  RUTH ANN obtained order from blue folder and faxed the order to [facility] with supporting clinical information.  Dennis with [employer] notified of new order.

## 2020-05-28 NOTE — PLAN OF CARE
All needs met. VSS. Safety precautions in place. Education presented. POC reviewed. Will continue monitoring.  Problem: Fall Injury Risk  Goal: Absence of Fall and Fall-Related Injury  Outcome: Ongoing, Progressing     Problem: Adult Inpatient Plan of Care  Goal: Plan of Care Review  Outcome: Ongoing, Progressing  Goal: Patient-Specific Goal (Individualization)  Outcome: Ongoing, Progressing  Goal: Absence of Hospital-Acquired Illness or Injury  Outcome: Ongoing, Progressing  Goal: Optimal Comfort and Wellbeing  Outcome: Ongoing, Progressing  Goal: Readiness for Transition of Care  Outcome: Ongoing, Progressing  Goal: Rounds/Family Conference  Outcome: Ongoing, Progressing     Problem: Adjustment to Illness (Acute Coronary Syndrome)  Goal: Optimal Adaptation to Illness  Outcome: Ongoing, Progressing     Problem: Arrhythmia (Acute Coronary Syndrome)  Goal: Normalized Cardiac Rhythm  Outcome: Ongoing, Progressing     Problem: Pain (Acute Coronary Syndrome)  Goal: Absence of Cardiac-Related Pain  Outcome: Ongoing, Progressing     Problem: Hemodynamic Instability (Acute Coronary Syndrome)  Goal: Effective Cardiac Pump Function  Outcome: Ongoing, Progressing     Problem: Tissue Perfusion (Acute Coronary Syndrome)  Goal: Adequate Tissue Perfusion  Outcome: Ongoing, Progressing     Problem: Adjustment to Illness (Heart Failure)  Goal: Optimal Coping  Outcome: Ongoing, Progressing     Problem: Arrhythmia/Dysrhythmia (Heart Failure)  Goal: Stable Heart Rate and Rhythm  Outcome: Ongoing, Progressing     Problem: Cardiac Output Decreased (Heart Failure)  Goal: Optimal Cardiac Output  Outcome: Ongoing, Progressing     Problem: Fluid Imbalance (Heart Failure)  Goal: Fluid Balance  Outcome: Ongoing, Progressing     Problem: Functional Ability Impaired (Heart Failure)  Goal: Optimal Functional Ability  Outcome: Ongoing, Progressing     Problem: Oral Intake Inadequate (Heart Failure)  Goal: Optimal Nutrition Intake  Outcome:  Ongoing, Progressing     Problem: Respiratory Compromise (Heart Failure)  Goal: Effective Oxygenation and Ventilation  Outcome: Ongoing, Progressing     Problem: Sleep Disordered Breathing (Heart Failure)  Goal: Effective Breathing Pattern During Sleep  Outcome: Ongoing, Progressing

## 2020-05-28 NOTE — ASSESSMENT & PLAN NOTE
-Presents with acute decompensated combined CHF  -EF 20%  -Continue IV diuresis for additional day  -Continue BB, ACEi  -Add Imdur  -Strict I's/O's  -Ischemic evaluation prior to d/c    5/25/2020  -Clinically improved  -Continue BB, po Lasix, Imdur  -Hold ACEi in anticipation of R/LHC, postponed today due to possible DT's  -Will reassess in AM    5/26/2020  -Stable overnight  -Continue BB, Lasix, Imdur  -R/LHC today    5/27/2020  -Continue BB, Lasix, Imdur  -Add Losartan 25 mg daily  -Reassess in AM  -LHC showed multivessel CAD, will need LifeVest prior to d/c for SCD prevention--->ICM    5/28/2020  -Stable  -Continue BB, Lasix,Imdur, ARB

## 2020-05-28 NOTE — PROGRESS NOTES
[facility] -   Cardiology  Progress Note    Patient Name: Ceasar Hernandez  MRN: [id number]  Admission Date: 5/20/2020  Hospital Length of Stay: 8 days  Code Status: Full Code   Attending Physician: Timothy Hunter MD   Primary Care Physician: Provider Notinsystem  Expected Discharge Date:   Principal Problem:NSTEMI (non-ST elevated myocardial infarction)    Subjective:   HPI:  Pt presents with acute respiratory failure.  Per ER chart, suspicious for Covid 19.  Cardiology consult performed based on communication from ER physician and chart review.  He has h/o COPD.  Pt presented to ER with c/o dyspnea since yesterday with associated wheezing.  Per chart no cp sxs.  ECG in ER showed possible SVT.  Adenosine administered and f/u ecgs looked more like sinus tachycardia.  BP very high on arrival 217/109.  Given Labetalol in ER.    BNP 1194  Troponin 0.09  Lactic acid +  ecg with inferolateral st-t abnl.  No old records to compare/review.    Hospital Course:   5/21/20:  PT SEEN AND EXAMINED IN ICU.  HE FELT REMARKABLY IMPROVED, ON NASAL CANNULA.  DENIES CHEST PAIN SXS.  DYSPNEA MUCH IMPROVED.  TROPONIN PEAK NEAR 2.6  BP HAS BEEN VERY HIGH. LACTIC ACID NORMAL NOW.  CREATININE STABLE.    5/22/2020-Patient seen and examined today, resting in bed. Feeling much better, SOB greatly improved. Some mild chest pressure, overnight, has since resolved. Labs reviewed. Troponin> 6 this AM, will repeat later today. Creatinine 1.9. BNP improved but still elevated.     5/23/20:  PT SEEN AND EXAMINED THIS AM.  NO CP SXS.  STILL WITH SOME DYSPNEA BUT MUCH IMPROVED.  LABS STABLE OVERALL. CREATININE BUMPED UP SLIGHTLY.  TROPONIN TRENDING DOWN.  BP MUCH IMPROVED.  ALSO DRINKS 1 PINT ETOH EVERY FEW DAYS.     5/24/20:  PT SEEN AND EXAMINED THIS AM. FELT BETTER.  NO RECURRENT CP.  DYSPNEA IMPROVED.  CREATININE STABLE, 1.9.  BNP MUCH IMPROVED.  BP REMAINS WELL CONTROLLED NOW.  + ABNL VASC STUDIES INFRAPOPLITEAL  "DISEASE.    5/25/2020-Patient seen and examined, lying in bed. Feels ok. Complains of having the "shakes". No chest pain or SOB. Labs reviewed. Creatinine stable at 2.0. L/RHC postponed due to possible DT's, discussed with hospital medicine.    5/26/2020-Patient seen and examined today, lying comfortably in bed. Feeling better. Shakes have resolved. No chest pain/SOB. Labs reviewed. Creatinine improved to 1.6. L/RHC today.    5/27/2020-Patient seen and examined today, s/p LHC yesterday that showed multivessel CAD. Feels ok. No chest pain/SOB. CVT evaluated patient, not candidate for CABG given poor targets. Labs reviewed and are stable. Meds further optimized.     5/28/2020-Patient seen and examined today. Feels ok. Less joint pain. No chest pain/SOB. Labs reviewed. BMP stable. Needs LifeVest for SCD prevention.      Review of Systems   Constitution: Positive for malaise/fatigue.   HENT: Negative.    Eyes: Negative.    Cardiovascular: Negative.    Respiratory: Negative.    Endocrine: Negative.    Hematologic/Lymphatic: Negative.    Skin: Negative.    Musculoskeletal: Positive for arthritis and joint pain.   Gastrointestinal: Negative.    Genitourinary: Negative.    Neurological: Negative.    Psychiatric/Behavioral: Negative.    Allergic/Immunologic: Negative.      Objective:     Vital Signs (Most Recent):  Temp: 98 °F (36.7 °C) (05/28/20 1109)  Pulse: 95 (05/28/20 1109)  Resp: 18 (05/28/20 1109)  BP: 115/67 (05/28/20 1109)  SpO2: 95 % (05/28/20 1109) Vital Signs (24h Range):  Temp:  [97.4 °F (36.3 °C)-99.6 °F (37.6 °C)] 98 °F (36.7 °C)  Pulse:  [90-98] 95  Resp:  [17-20] 18  SpO2:  [94 %-97 %] 95 %  BP: (105-120)/(60-72) 115/67     Weight: 89 kg (196 lb 3.4 oz)  Body mass index is 28.98 kg/m².     SpO2: 95 %  O2 Device (Oxygen Therapy): room air      Intake/Output Summary (Last 24 hours) at 5/28/2020 1436  Last data filed at 5/28/2020 0600  Gross per 24 hour   Intake 480 ml   Output 1025 ml   Net -545 ml "       Lines/Drains/Airways     Peripheral Intravenous Line                 Peripheral IV - Single Lumen 05/20/20 2240 20 G Left Hand 7 days                Physical Exam   Constitutional: He is oriented to person, place, and time. He appears well-developed and well-nourished. No distress.   HENT:   Head: Normocephalic and atraumatic.   Eyes: Pupils are equal, round, and reactive to light. Right eye exhibits no discharge. Left eye exhibits no discharge.   Neck: Neck supple. No JVD present.   Cardiovascular: Normal rate, regular rhythm, S1 normal, S2 normal and normal heart sounds.   No murmur heard.  Pulmonary/Chest: Effort normal and breath sounds normal. No respiratory distress. He has no wheezes. He has no rales.   Abdominal: Soft. He exhibits no distension.   Musculoskeletal: He exhibits no edema.   Neurological: He is alert and oriented to person, place, and time.   Skin: Skin is warm and dry. He is not diaphoretic. No erythema.   Psychiatric: He has a normal mood and affect. His behavior is normal. Thought content normal.   Nursing note and vitals reviewed.      Significant Labs:   CMP   Recent Labs   Lab 05/27/20  0621 05/28/20  0425    140   K 4.2 3.6    107   CO2 21* 22*   GLU 87 114*   BUN 23 29*   CREATININE 1.6* 1.9*   CALCIUM 8.9 9.1   ANIONGAP 10 11   ESTGFRAFRICA 53* 43*   EGFRNONAA 46* 37*   , CBC   Recent Labs   Lab 05/27/20  0621   WBC 11.69   HGB 11.9*   HCT 36.7*   *   , Troponin No results for input(s): TROPONINI in the last 48 hours. and All pertinent lab results from the last 24 hours have been reviewed.    Significant Imaging: Echocardiogram: 2D echo with color flow doppler: No results found for this or any previous visit., EKG: Reviewed and X-Ray: CXR: X-Ray Chest 1 View (CXR): No results found for this visit on 05/20/20. and X-Ray Chest PA and Lateral (CXR): No results found for this visit on 05/20/20.    Assessment and Plan:   Patient who presents with NSTEMI/acute CHF s/p  LHC that showed multivessel CAD. Continue OMT. LifeVest for SCD prevention.    * NSTEMI (non-ST elevated myocardial infarction)  -Troponin > 6 this AM, repeat this afternoon  -Continue ASA, Plavix, BB, statin, ACEi  -Add Imdur 30 mg daily  -Continue heparin gtt for additional day  -EF 20%  -Will need ischemic evaluation with probable LHC pending clinical course    5/25/2020  -Remains chest pain free  -Continue ASA, Plavix, BB, statin  -Hold ACEi in anticipation of LHC  -Ok to stop heparin gtt  -NPO after MN  -Reassess in AM    5/26/2020  -Stable overnight  -Continue ASA, Plavix, BB, statin  -R/LHC today. All risks benefits, and treatment alternatives explained to patient in detail. All questions answered. He has agreed to proceed    5/27/2020  -LHC showed multivessel CAD  -Not candidate for CABG due to poor targets  -Will optimize medical therapy  -Continue ASA, Plavix, BB, statin, Imdur  -Add Ranexa 500 mg BID and Losartan 25 mg daily  -Reassess in AM    5/28/2020  -Continue OMT  -Reduce Ranexa to 500 mg daily  -LifeVest    CRI (chronic renal insufficiency)  -Creatinine stable at 1.9  -Continue to monitor      ETOH abuse  -Patient counseled on cessation of alcohol.  -Concerned about possible DT's, discussed with hospital medicine    PAD (peripheral artery disease)  -Continue ASA, Plavix, statin    Smoking history  -Smoking cessation      Lactic acidosis  -Normalized since admission    COPD (chronic obstructive pulmonary disease)  -Per hospital medicine, critical care mgt.    Essential hypertension  -BP improved  -Continue Coreg, ACEi  -Imdur 30 mg daily added  -Monitor    5/25/2020  -BP stable  -Continue Coreg, Imdur  -ACEi held in anticipation of R/LHC    5/27/2020  -Continue Coreg, Imdur  -Losartan added  -Monitor    Abnormal ECG  -Ischemic evaluation with LHC vs MPI stress test pending clinical course    Acute combined systolic and diastolic CHF, NYHA class 4, EF 20%   -Presents with acute decompensated combined  CHF  -EF 20%  -Continue IV diuresis for additional day  -Continue BB, ACEi  -Add Imdur  -Strict I's/O's  -Ischemic evaluation prior to d/c    5/25/2020  -Clinically improved  -Continue BB, po Lasix, Imdur  -Hold ACEi in anticipation of R/LHC, postponed today due to possible DT's  -Will reassess in AM    5/26/2020  -Stable overnight  -Continue BB, Lasix, Imdur  -R/LHC today    5/27/2020  -Continue BB, Lasix, Imdur  -Add Losartan 25 mg daily  -Reassess in AM  -LHC showed multivessel CAD, will need LifeVest prior to d/c for SCD prevention--->ICM    5/28/2020  -Stable  -Continue BB, Lasix,Imdur, ARB        VTE Risk Mitigation (From admission, onward)         Ordered     enoxaparin injection 40 mg  Daily      05/28/20 1345     IP VTE HIGH RISK PATIENT  Once      05/20/20 2058     Place sequential compression device  Until discontinued      05/20/20 2058                Renata Chowdhury PA-C  Cardiology  [facility]

## 2020-05-29 VITALS
HEART RATE: 90 BPM | RESPIRATION RATE: 18 BRPM | WEIGHT: 199.5 LBS | TEMPERATURE: 98 F | DIASTOLIC BLOOD PRESSURE: 62 MMHG | HEIGHT: 69 IN | BODY MASS INDEX: 29.55 KG/M2 | SYSTOLIC BLOOD PRESSURE: 112 MMHG | OXYGEN SATURATION: 93 %

## 2020-05-29 DIAGNOSIS — I21.4 NSTEMI (NON-ST ELEVATED MYOCARDIAL INFARCTION): Primary | ICD-10-CM

## 2020-05-29 PROBLEM — R06.03 RESPIRATORY DISTRESS, ACUTE: Status: RESOLVED | Noted: 2020-05-21 | Resolved: 2020-05-29

## 2020-05-29 PROBLEM — D72.829 LEUKOCYTOSIS: Status: RESOLVED | Noted: 2020-05-21 | Resolved: 2020-05-29

## 2020-05-29 LAB
ANION GAP SERPL CALC-SCNC: 11 MMOL/L (ref 8–16)
BASOPHILS # BLD AUTO: 0.05 K/UL (ref 0–0.2)
BASOPHILS NFR BLD: 0.5 % (ref 0–1.9)
BUN SERPL-MCNC: 24 MG/DL (ref 8–23)
CALCIUM SERPL-MCNC: 9.2 MG/DL (ref 8.7–10.5)
CHLORIDE SERPL-SCNC: 107 MMOL/L (ref 95–110)
CO2 SERPL-SCNC: 21 MMOL/L (ref 23–29)
CREAT SERPL-MCNC: 1.7 MG/DL (ref 0.5–1.4)
DIFFERENTIAL METHOD: ABNORMAL
EOSINOPHIL # BLD AUTO: 0.4 K/UL (ref 0–0.5)
EOSINOPHIL NFR BLD: 3.9 % (ref 0–8)
ERYTHROCYTE [DISTWIDTH] IN BLOOD BY AUTOMATED COUNT: 16.7 % (ref 11.5–14.5)
EST. GFR  (AFRICAN AMERICAN): 49 ML/MIN/1.73 M^2
EST. GFR  (NON AFRICAN AMERICAN): 43 ML/MIN/1.73 M^2
GLUCOSE SERPL-MCNC: 103 MG/DL (ref 70–110)
HCT VFR BLD AUTO: 37 % (ref 40–54)
HGB BLD-MCNC: 12.2 G/DL (ref 14–18)
IMM GRANULOCYTES # BLD AUTO: 0.14 K/UL (ref 0–0.04)
IMM GRANULOCYTES NFR BLD AUTO: 1.3 % (ref 0–0.5)
LYMPHOCYTES # BLD AUTO: 1.1 K/UL (ref 1–4.8)
LYMPHOCYTES NFR BLD: 10.7 % (ref 18–48)
MAGNESIUM SERPL-MCNC: 2.2 MG/DL (ref 1.6–2.6)
MCH RBC QN AUTO: 31.4 PG (ref 27–31)
MCHC RBC AUTO-ENTMCNC: 33 G/DL (ref 32–36)
MCV RBC AUTO: 95 FL (ref 82–98)
MONOCYTES # BLD AUTO: 1.5 K/UL (ref 0.3–1)
MONOCYTES NFR BLD: 14.3 % (ref 4–15)
NEUTROPHILS # BLD AUTO: 7.3 K/UL (ref 1.8–7.7)
NEUTROPHILS NFR BLD: 69.3 % (ref 38–73)
NRBC BLD-RTO: 0 /100 WBC
PHOSPHATE SERPL-MCNC: 3.7 MG/DL (ref 2.7–4.5)
PLATELET # BLD AUTO: 163 K/UL (ref 150–350)
PMV BLD AUTO: 13.9 FL (ref 9.2–12.9)
POTASSIUM SERPL-SCNC: 4 MMOL/L (ref 3.5–5.1)
RBC # BLD AUTO: 3.88 M/UL (ref 4.6–6.2)
SODIUM SERPL-SCNC: 139 MMOL/L (ref 136–145)
WBC # BLD AUTO: 10.53 K/UL (ref 3.9–12.7)

## 2020-05-29 PROCEDURE — 94761 N-INVAS EAR/PLS OXIMETRY MLT: CPT

## 2020-05-29 PROCEDURE — 84100 ASSAY OF PHOSPHORUS: CPT

## 2020-05-29 PROCEDURE — 97110 THERAPEUTIC EXERCISES: CPT

## 2020-05-29 PROCEDURE — 36415 COLL VENOUS BLD VENIPUNCTURE: CPT

## 2020-05-29 PROCEDURE — 85025 COMPLETE CBC W/AUTO DIFF WBC: CPT

## 2020-05-29 PROCEDURE — 25000003 PHARM REV CODE 250: Performed by: INTERNAL MEDICINE

## 2020-05-29 PROCEDURE — 97530 THERAPEUTIC ACTIVITIES: CPT

## 2020-05-29 PROCEDURE — 25000003 PHARM REV CODE 250: Performed by: PHYSICIAN ASSISTANT

## 2020-05-29 PROCEDURE — 83735 ASSAY OF MAGNESIUM: CPT

## 2020-05-29 PROCEDURE — S4991 NICOTINE PATCH NONLEGEND: HCPCS | Performed by: INTERNAL MEDICINE

## 2020-05-29 PROCEDURE — 80048 BASIC METABOLIC PNL TOTAL CA: CPT

## 2020-05-29 RX ORDER — ASPIRIN 81 MG/1
81 TABLET ORAL DAILY
Qty: 30 TABLET | Refills: 0 | Status: SHIPPED | OUTPATIENT
Start: 2020-05-30 | End: 2020-06-29

## 2020-05-29 RX ORDER — RANOLAZINE 500 MG/1
500 TABLET, EXTENDED RELEASE ORAL DAILY
Qty: 30 TABLET | Refills: 0 | Status: SHIPPED | OUTPATIENT
Start: 2020-05-30 | End: 2020-06-29

## 2020-05-29 RX ORDER — ISOSORBIDE MONONITRATE 30 MG/1
30 TABLET, EXTENDED RELEASE ORAL DAILY
Qty: 30 TABLET | Refills: 0 | Status: SHIPPED | OUTPATIENT
Start: 2020-05-30 | End: 2020-06-29

## 2020-05-29 RX ORDER — FUROSEMIDE 40 MG/1
40 TABLET ORAL DAILY
Qty: 30 TABLET | Refills: 0 | Status: SHIPPED | OUTPATIENT
Start: 2020-05-30 | End: 2020-06-29

## 2020-05-29 RX ORDER — ATORVASTATIN CALCIUM 80 MG/1
80 TABLET, FILM COATED ORAL NIGHTLY
Qty: 30 TABLET | Refills: 0 | Status: SHIPPED | OUTPATIENT
Start: 2020-05-29 | End: 2020-06-28

## 2020-05-29 RX ORDER — CLOPIDOGREL BISULFATE 75 MG/1
75 TABLET ORAL DAILY
Qty: 30 TABLET | Refills: 0 | Status: SHIPPED | OUTPATIENT
Start: 2020-05-30 | End: 2020-06-29

## 2020-05-29 RX ORDER — LOSARTAN POTASSIUM 25 MG/1
25 TABLET ORAL DAILY
Qty: 30 TABLET | Refills: 0 | Status: SHIPPED | OUTPATIENT
Start: 2020-05-30 | End: 2020-06-29

## 2020-05-29 RX ORDER — CHLORDIAZEPOXIDE HYDROCHLORIDE 10 MG/1
10 CAPSULE, GELATIN COATED ORAL DAILY
Qty: 3 CAPSULE | Refills: 0 | Status: SHIPPED | OUTPATIENT
Start: 2020-05-29 | End: 2020-06-01

## 2020-05-29 RX ORDER — CARVEDILOL 3.12 MG/1
3.12 TABLET ORAL 2 TIMES DAILY
Qty: 60 TABLET | Refills: 0 | Status: SHIPPED | OUTPATIENT
Start: 2020-05-29 | End: 2020-06-28

## 2020-05-29 RX ADMIN — CARVEDILOL 3.12 MG: 3.12 TABLET, FILM COATED ORAL at 10:05

## 2020-05-29 RX ADMIN — FUROSEMIDE 20 MG: 20 TABLET ORAL at 10:05

## 2020-05-29 RX ADMIN — ASPIRIN 81 MG: 81 TABLET, COATED ORAL at 10:05

## 2020-05-29 RX ADMIN — CHLORDIAZEPOXIDE HYDROCHLORIDE 10 MG: 10 CAPSULE ORAL at 10:05

## 2020-05-29 RX ADMIN — ISOSORBIDE MONONITRATE 30 MG: 30 TABLET, EXTENDED RELEASE ORAL at 10:05

## 2020-05-29 RX ADMIN — ALPRAZOLAM 0.5 MG: 0.5 TABLET ORAL at 10:05

## 2020-05-29 RX ADMIN — LOSARTAN POTASSIUM 25 MG: 25 TABLET, FILM COATED ORAL at 10:05

## 2020-05-29 RX ADMIN — RANOLAZINE 500 MG: 500 TABLET, FILM COATED, EXTENDED RELEASE ORAL at 10:05

## 2020-05-29 RX ADMIN — NICOTINE 1 PATCH: 14 PATCH TRANSDERMAL at 10:05

## 2020-05-29 RX ADMIN — ACETAMINOPHEN 650 MG: 325 TABLET ORAL at 10:05

## 2020-05-29 RX ADMIN — Medication 100 MG: at 10:05

## 2020-05-29 RX ADMIN — CLOPIDOGREL BISULFATE 75 MG: 75 TABLET ORAL at 10:05

## 2020-05-29 NOTE — PLAN OF CARE
Bed Mobility mod A with cues for log-rolling; sit to stand min A with ue support and pivot steps from bed to w/c min A for balance/safety/coord r/t unsteady and inc time needed; rec hhpt and family spv

## 2020-05-29 NOTE — PT/OT/SLP PROGRESS
Physical Therapy  Treatment    Ceasar Hernandez   MRN: [id number]   Admitting Diagnosis: NSTEMI (non-ST elevated myocardial infarction)       PT Start Time: 1350     PT Stop Time: 1415    PT Total Time (min): 25 min       Billable Minutes:  Therapeutic Activity 15 and Therapeutic Exercise 10    Treatment Type: Treatment  PT/PTA: PT             General Precautions: Standard, fall  Orthopedic Precautions: N/A   Braces: N/A         Subjective:  Communicated with RN prior to session.      Pain/Comfort  Pain Rating 1: 5/10  Location - Side 1: Right  Location - Orientation 1: generalized(ue/le)  Pain Addressed 1: Reposition, Cessation of Activity, Distraction  Pain Rating Post-Intervention 1: 0/10(at rest)    Objective:        Functional Mobility:  Bed Mobility: supine to sit with mod A and tc/vc's for log-rolling and use of bed rail - inc time needed; sat eob F+ static but F dynamic - unsteady with reaching       Transfers: sit to/from stand min A with inc time needed then patient only able to take pivot steps to w/c on his L; b ue support during transfer - cues form pT for wt shifting       Gait: pivot steps only       Stairs:  n/a    Balance:   Static Sit: F+  Dynamic Sit: F  Static Stand: F with ue support  Dynamic stand: P+ with ue support     Therapeutic Activities and Exercises:  PT educated patient on POC, b le te/rom to prep mobility/dec stiffness and on safety/fall precautions with mobility    AM-PAC 6 CLICK MOBILITY  How much help from another person does this patient currently need?   1 = Unable, Total/Dependent Assistance  2 = A lot, Maximum/Moderate Assistance  3 = A little, Minimum/Contact Guard/Supervision  4 = None, Modified [place]/Independent    Turning over in bed (including adjusting bedclothes, sheets and blankets)?: 3  Sitting down on and standing up from a chair with arms (e.g., wheelchair, bedside commode, etc.): 3  Moving from lying on back to sitting on the side of the bed?: 4  Moving to and  from a bed to a chair (including a wheelchair)?: 3  Need to walk in hospital room?: 1  Climbing 3-5 steps with a railing?: 1  Basic Mobility Total Score: 15    AM-PAC Raw Score CMS G-Code Modifier Level of Impairment Assistance   6 % Total / Unable   7 - 9 CM 80 - 100% Maximal Assist   10 - 14 CL 60 - 80% Moderate Assist   15 - 19 CK 40 - 60% Moderate Assist   20 - 22 CJ 20 - 40% Minimal Assist   23 CI 1-20% SBA / CGA   24 CH 0% Independent/ Mod I     Patient left up in chair with all lines intact, call button in reach, RN notified and CNA present.    Assessment:  Ceasar Hernandez is a 60 y.o. male with a medical diagnosis of NSTEMI (non-ST elevated myocardial infarction) and presents with impaired mobility.    Rehab identified problem list/impairments: Rehab identified problem list/impairments: weakness, impaired functional mobilty, decreased safety awareness, decreased coordination, impaired endurance, gait instability, pain, decreased upper extremity function, decreased lower extremity function, impaired self care skills, impaired balance, decreased ROM, edema    Rehab potential is fair.    Activity tolerance: Fair    Discharge recommendations: Discharge Facility/Level of Care Needs: home health PT, home(with famiy spv/assist)     Barriers to discharge:      Equipment recommendations: Equipment Needed After Discharge: bedside commode, bath bench     GOALS:   Multidisciplinary Problems     Physical Therapy Goals        Problem: Physical Therapy Goal    Goal Priority Disciplines Outcome Goal Variances Interventions   Physical Therapy Goal     PT, PT/OT Ongoing, Progressing     Description:  LTG'S TO BE MET IN 7 DAYS (6-3-20)  1. PT WILL REQUIRE KIMBERLY FOR SUP>SIT  2. PT WILL REQUIRE MODA FOR TF  3. PT WILL ' WITH RW AND MODA                    PLAN:    Patient to be seen 5 x/week  to address the above listed problems via gait training, therapeutic activities, therapeutic exercises  Plan of Care expires:  06/03/20  Plan of Care reviewed with: patient         Robby Corazon, PT  05/29/2020

## 2020-05-29 NOTE — PLAN OF CARE
Dx nstemi  Poc instructed on dbc 10 x q1h wa. Instructed on turning q2h. Instructed on fall risk and precautinos. PT and OT seeing pt. Urinal. Bsc at bedside. Bed alarm on. Denies chest pain. Life vest on PT. Tolerating. Water, p linda and call liight in reach.

## 2020-05-29 NOTE — PROGRESS NOTES
"  [facility]  Adult Nutrition  Consult Note    SUMMARY     Recommendations    Recommendation:   1. Continue cardiac diet    2. RD to provide education and f/u  Goals: Pt to continue >75% meal intake by RD f/u  Nutrition Goal Status: new  Communication of RD Recs: (POC)     Reason for Assessment    Reason For Assessment: length of stay  Diagnosis: cardiac disease  Relevant Medical History: COPD, HTN, alcohol abuse  General Information Comments: Pt presented with SOB, diagnosed with CAD, not a good candidate for CABG. He is on telemetry on a cardiac diet, % meal intake. Pt unavailable for cardiac education- will reattempt at follow up.  Nutrition Discharge Planning: cardiac diet    Nutrition Risk Screen    Nutrition Risk Screen: no indicators present    Nutrition/Diet History    Spiritual, Cultural Beliefs, Taoism Practices, Values that Affect Care: no  Food Allergies: NKFA    Anthropometrics    Temp: 96.6 °F (35.9 °C)  Height Method: Stated  Height: 5' 9" (175.3 cm)  Height (inches): 69 in  Weight Method: Bed Scale  Weight: 90.5 kg (199 lb 8.3 oz)  Weight (lb): 199.52 lb  Ideal Body Weight (IBW), Male: 160 lb  % Ideal Body Weight, Male (lb): 123.13 %  BMI (Calculated): 29.4  BMI Grade: 25 - 29.9 - overweight       Lab/Procedures/Meds  Pertinent Labs: reviewed    Lab Results   Component Value Date     05/29/2020    K 4.0 05/29/2020     05/29/2020    CO2 21 (L) 05/29/2020    BUN 24 (H) 05/29/2020    CREATININE 1.7 (H) 05/29/2020    CALCIUM 9.2 05/29/2020    ANIONGAP 11 05/29/2020    ESTGFRAFRICA 49 (A) 05/29/2020    EGFRNONAA 43 (A) 05/29/2020     Lab Results   Component Value Date    CALCIUM 9.2 05/29/2020    PHOS 3.7 05/29/2020     Lab Results   Component Value Date    ALBUMIN 2.7 (L) 05/26/2020     No results found for: LABA1C, HGBA1C  No results for input(s): POCTGLUCOSE in the last 24 hours.  Pertinent Medications: reviewed    Estimated/Assessed Needs    Weight Used For " Calorie Calculations: 90.5 kg (199 lb 8.3 oz)  Energy Calorie Requirements (kcal): 2040-   Energy Need Method: [place]-St Jeor(x 1.2- 1.4)  Protein Requirements: 72-90g  Weight Used For Protein Calculations: 90.5 kg (199 lb 8.3 oz)     Estimated Fluid Requirement Method: RDA Method(or per MD)  RDA Method (mL): 2040     Nutrition Prescription Ordered    Current Diet Order: cardiac diet    Evaluation of Received Nutrient/Fluid Intake       Intake/Output Summary (Last 24 hours) at 5/29/2020 1231  Last data filed at 5/29/2020 0500  Gross per 24 hour   Intake 1440 ml   Output 2075 ml   Net -635 ml     % Intake of Estimated Energy Needs: 75 - 100 %  % Meal Intake: 75 - 100 %    Nutrition Risk    1x week     Assessment and Plan    Nutrition Problem  Altered nutrition related lab values    Related to (etiology):   Cardiac dysfunction    Signs and Symptoms (as evidenced by):   Edema, shortness of breath    Interventions:  Nutrition Education content    Nutrition Diagnosis Status:   New    Monitor and Evaluation    Food and Nutrient Intake: food and beverage intake  Food and Nutrient Adminstration: diet order  Anthropometric Measurements: weight  Biochemical Data, Medical Tests and Procedures: electrolyte and renal panel, glucose/endocrine profile, lipid profile  Nutrition-Focused Physical Findings: overall appearance     Malnutrition Assessment             Due to recent hospital wide restrictions to limit the transfer of (COVID-19), we are limiting physical exams. All S/S will be observational; NFPE to be performed at a future date.  Nutrition Follow-Up    RD Follow-up?: Yes    Thanks!  Nova Chavez MPH RD LDN

## 2020-05-29 NOTE — PLAN OF CARE
Recommendations    Recommendation:   1. Continue cardiac diet    2. RD to provide education and f/u  Goals: Pt to continue >75% meal intake by RD f/u  Nutrition Goal Status: new  Communication of RD Recs: (POC)

## 2020-05-29 NOTE — PLAN OF CARE
Pt AAOx4. VSS. Pt remained free of falls this shift. No complaints of pain or discomfort. Medications administered as ordered. Pt is normal sinus on monitor. Hourly rounding completed. Pt instructed to call for assistance. POC reviewed. Pt verbalized understanding. Will continue to monitor.

## 2020-05-29 NOTE — PLAN OF CARE
Lifevest provided by Bernice     05/29/20 1455   Final Note   Assessment Type Final Discharge Note   Anticipated Discharge Disposition Home   Right Care Referral Info   Post Acute Recommendation No Care

## 2020-05-29 NOTE — PT/OT/SLP PROGRESS
Physical Therapy  Treatment    Ceasar Hernandez   MRN: [id number]   Admitting Diagnosis: NSTEMI (non-ST elevated myocardial infarction)    PT Received On: 05/28/20  PT Start Time: 1000     PT Stop Time: 1025    PT Total Time (min): 25 min       Billable Minutes:  Therapeutic Activity 25    Treatment Type: Treatment  PT/PTA: PT        General Precautions: Standard, fall  Orthopedic Precautions: N/A     Subjective:  Communicated with NURSE ALDO prior to session.    Objective:  8/10 PAIN TO RUE AND RLE DUE TO GOUT    Functional Mobility:  Therapeutic Activities and Exercises:  PT FOUND SUPINE IN BED UPON ARRIVAL, AGREEABLE TO TX., READY TO GO HOME, SUP>SIT WITH SBA, SEATED SCOOT TO EOB WITH SBA, EXTRA TIME DUE TO C/O GOUT PAIN IN RUE AND RLE, REVIEW RW USE AND SAFETY DURING TF'S AND GAIT, WITH MUCH ENCOURAGEMENT PT AGREEABLE TO STAND, SIT>STAND WITH MODA, PT STOOD FOR APPROX. 4 MINUTES WITH CGA FOR  TO EXAMINE AND SPEAK, PT DECLINED TF TO CHAIR DESPITE ENCOURAGEMENT, RETURN TO SITTING EOB WITH MODA, PT ABLE TO SEATED SCOOT TOWARD HOB WITH SBA AND EXTRA TIME, PT RETURN TO SUPINE WITH SBA, PT EDUCATED IN AND PERFORMED BLE BRIDGING X 6 REPS AROM WITH REST, REVIEW BLE THEREX TO PERFORM WHILE SUPINE IN BED, PT LEFT WITH ALL NEEDS MET    AM-PAC 6 CLICK MOBILITY  How much help from another person does this patient currently need?   1 = Unable, Total/Dependent Assistance  2 = A lot, Maximum/Moderate Assistance  3 = A little, Minimum/Contact Guard/Supervision  4 = None, Modified [place]/Independent         AM-PAC Raw Score CMS G-Code Modifier Level of Impairment Assistance   6 % Total / Unable   7 - 9 CM 80 - 100% Maximal Assist   10 - 14 CL 60 - 80% Moderate Assist   15 - 19 CK 40 - 60% Moderate Assist   20 - 22 CJ 20 - 40% Minimal Assist   23 CI 1-20% SBA / CGA   24 CH 0% Independent/ Mod I     Patient left HOB elevated with all lines intact, call button in reach and NURSE notified.    Assessment:  Ceasar  David is a 60 y.o. male with a medical diagnosis of NSTEMI (non-ST elevated myocardial infarction) and presents with IMPAIRED FUNCTIONAL  MOBILITY. PT WILL BENEFIT FROM CONT. SKILLED P.T. TO ADDRESS IMPAIRMENTS    Rehab identified problem list/impairments: Rehab identified problem list/impairments: weakness, impaired functional mobilty, gait instability, impaired balance, decreased coordination, decreased safety awareness, pain    Rehab potential is good.    Activity tolerance: Good    Discharge recommendations: Discharge Facility/Level of Care Needs: home health PT     Barriers to discharge:      Equipment recommendations: Equipment Needed After Discharge: bedside commode, bath bench     GOALS:   Multidisciplinary Problems     Physical Therapy Goals        Problem: Physical Therapy Goal    Goal Priority Disciplines Outcome Goal Variances Interventions   Physical Therapy Goal     PT, PT/OT Ongoing, Progressing     Description:  LTG'S TO BE MET IN 7 DAYS (6-3-20)  1. PT WILL REQUIRE KIMBERLY FOR SUP>SIT  2. PT WILL REQUIRE MODA FOR TF  3. PT WILL ' WITH RW AND MODA                    PLAN:    Patient to be seen 5 x/week  to address the above listed problems via gait training, therapeutic activities, therapeutic exercises  Plan of Care expires: 06/03/20  Plan of Care reviewed with: patient    Josefina Mikey, PT  5/28/20

## 2020-05-30 NOTE — DISCHARGE SUMMARY
[facility] Hospital Medicine  Discharge Summary      Patient Name: Ceasar Hernandez  MRN: [id number]  Admission Date: 5/20/2020  Hospital Length of Stay: 9 days  Discharge Date and Time: 5/29/2020  2:37 PM  Attending Physician: No att. providers found   Discharging Provider: Timothy Hunter MD  Primary Care Provider: Lillian Evansnsystem      HPI:   Pt is a 61 yo male with PMhx of COPD and HTN who was admitted to ICU in early AM of 5/21/2020 for Acute Respiratory Failure requiring BIPAP and decompensated combined heart failure. Pt reported WINSTON and lightheadedness. COVID 19 negative. Troponins were elevated 1.180 > 2.686 > 2.573 and Cardiology recommended heparin infusion. Pt was in acute CHF exacerbation and 2 d ECHO showed LVEF 20 % and diastolic dysfunction. Diuresis with IV lasix given. Initially, pt required supplemental oxygen via NIPPV which has thus been weaned. Also, pt received Plavix, BB, ACE inhibitor, ASA and STATIN. Pt's respiratory status improved and he was downgraded to Telemetry unit.     Procedure(s) (LRB):  CATHETERIZATION, HEART, BOTH LEFT AND RIGHT (N/A)      Hospital Course:   05/22/2020 Admitted 05/20/2020 to ICU for NSTEMI and Acute Combined Systolic and Diastolic heart failure. ECHO showed EF 20% and severe diastolic dysfunction. He stabilized and was transferred out of ICU to telemetry. Cardiology following. CV Meds include carvedilol, lisinopril, furosemide, isosorbide mononitrate, atorvastatin, and clopidogrel, aspirin. Symptoms MUCH improved. No present chest pain. Breathing much more comfortably. Good diuresis. Anticipating cardiac catheterization when more stable, probably Monday.    5/25/20- [facility] postponed by Cardiology this  morning due to concern over tremors  or shakes . Last drink was 5 days ago. Pt denies any H/O delirium tremens or alcohol withdrawal  in the past , states he can go days without drinking. States he was shaking due to room being too cold. Vitals are  stable . Pt is awake , alert and oriented x 3 and situation. Labs resulted Na 140, K 3.8, BUN 36, Creatinine 2.0.     5/26- Pt has no C/O today . Tremor or shakes are better . Creatinine is down to 1.6 . Vitals are stable . LHC today     5/27- S/P LHC/RHC yesterday . Noted severe 3 vessels CAD and CT Surgery was consulted . After careful evaluation CT surgery suggested that the patient with severe multivessel coronary artery disease with an EF of 15 to 20% % is not a candidate for coronary artery bypass grafting due to poor coronary targets. Cardiology will optimize medical therapy. Consult PT/OT before planning discharge.     5/28- Afebrile . No c/o today . Feels better overall . Vitals are stable . On RA. C/O loose stools  but wants to think it is due to chocolate milk. Denies chest pain, N/V , diaphoresis , abd pain. Labs resulted Na 140, K 3.6, creatinine increased to 1.9 from 1.6. Ranexa added to medication regimen . Awaiting Life Vest.     5/29- Life Vest arrived and applied . Pt is deemed medically stable to be discharged home with life vest in place .      Consults:   Consults (From admission, onward)        Status Ordering Provider     Inpatient consult to Cardiology  Once     Provider:  (Not yet assigned)    Completed TUSHAR ROCHA     Inpatient consult to Social Work  Once     Provider:  (Not yet assigned)    Completed MAE PANG     Inpatient consult to Social Work  Once     Provider:  (Not yet assigned)    Completed CHELY BURNETTE     Inpatient consult to Social Work  Once     Provider:  (Not yet assigned)    Completed MAE PANG          No new Assessment & Plan notes have been filed under this hospital service since the last note was generated.  Service: Hospital Medicine    Final Active Diagnoses:    Diagnosis Date Noted POA    PRINCIPAL PROBLEM:  NSTEMI (non-ST elevated myocardial infarction) [I21.4] 05/20/2020 Yes    Acute combined systolic and diastolic CHF, NYHA class  4, EF 20%  [I50.41] 05/20/2020 Yes    CRI (chronic renal insufficiency) [N18.9] 05/24/2020 Yes    ETOH abuse [F10.10] 05/23/2020 Yes    COPD (chronic obstructive pulmonary disease) [J44.9] 05/20/2020 Yes    Panlobular emphysema [J43.1] 05/22/2020 Yes    PAD (peripheral artery disease) [I73.9] 05/23/2020 Yes    Anxiety [F41.9] 05/22/2020 Yes    Smoking history [Z87.891] 05/21/2020 Not Applicable    Essential hypertension [I10] 05/20/2020 Yes    Lactic acidosis [E87.2] 05/20/2020 Yes      Problems Resolved During this Admission:    Diagnosis Date Noted Date Resolved POA    Respiratory distress, acute [R06.03] 05/21/2020 05/29/2020 Yes    Leukocytosis [D72.829] 05/21/2020 05/29/2020 Yes    Respiratory failure, acute [J96.00] 05/20/2020 05/21/2020 Yes       Discharged Condition: stable    Disposition: Home or Self Care    Follow Up:  Follow-up Information     Provider Notinsystem.    Why:  Have pt sees PCP in 3 days for discharge follow up           Benjamin Correia MD In 1 week.    Specialty:  Cardiology  Why:  Discharge follow up   Contact information:  [address] [place] [address] [phone]                 Patient Instructions:      Diet Cardiac   Order Comments: Salt 2 gram/day   Fluid restriction 1.5 Liter /day     Activity as tolerated       Significant Diagnostic Studies: Labs:   BMP:   Recent Labs   Lab 05/29/20  0505         K 4.0      CO2 21*   BUN 24*   CREATININE 1.7*   CALCIUM 9.2   MG 2.2   , CMP   Recent Labs   Lab 05/29/20  0505      K 4.0      CO2 21*      BUN 24*   CREATININE 1.7*   CALCIUM 9.2   ANIONGAP 11   ESTGFRAFRICA 49*   EGFRNONAA 43*    and CBC   Recent Labs   Lab 05/29/20  0505   WBC 10.53   HGB 12.2*   HCT 37.0*          Pending Diagnostic Studies:     None         Medications:  Reconciled Home Medications:      Medication List      START taking these medications    aspirin 81 MG EC tablet  Commonly known as:   ECOTRIN  Take 1 tablet (81 mg total) by mouth once daily.     atorvastatin 80 MG tablet  Commonly known as:  LIPITOR  Take 1 tablet (80 mg total) by mouth every evening.     carvediloL 3.125 MG tablet  Commonly known as:  COREG  Take 1 tablet (3.125 mg total) by mouth 2 (two) times daily.     chlordiazepoxide 10 MG capsule  Commonly known as:  LIBRIUM  Take 1 capsule (10 mg total) by mouth once daily. for 3 days     clopidogreL 75 mg tablet  Commonly known as:  PLAVIX  Take 1 tablet (75 mg total) by mouth once daily.     furosemide 40 MG tablet  Commonly known as:  LASIX  Take 1 tablet (40 mg total) by mouth once daily.     isosorbide mononitrate 30 MG 24 hr tablet  Commonly known as:  IMDUR  Take 1 tablet (30 mg total) by mouth once daily.     losartan 25 MG tablet  Commonly known as:  COZAAR  Take 1 tablet (25 mg total) by mouth once daily.     ranolazine 500 MG Tb12  Commonly known as:  RANEXA  Take 1 tablet (500 mg total) by mouth once daily.            Indwelling Lines/Drains at time of discharge:   Lines/Drains/Airways     None                 Time spent on the discharge of patient: 30  minutes  Patient was seen and examined on the date of discharge and determined to be suitable for discharge.         Timothy Hunter MD  Department of Hospital Medicine  [facility]

## 2020-06-01 ENCOUNTER — PATIENT OUTREACH (OUTPATIENT)
Dept: ADMINISTRATIVE | Facility: CLINIC | Age: 60
End: 2020-06-01

## 2020-06-01 NOTE — PHYSICIAN QUERY
PT Name: Ceasar Hernandez  MR #: [id number]     RENAL CONDITION CLARIFICATION     CDS/: Ladonna Buchanan RN CCDS            Contact information:[email or web address]  This form is a permanent document in the medical record.     Query Date: June 1, 2020    By submitting this query, we are merely seeking further clarification of documentation.  Please utilize your independent clinical judgment when addressing the question(s) below.    The Medical Record contains the following:   Indicator Supporting Clinical Findings Location in Medical Record   x Kidney (Renal) Insufficiency CRI (chronic renal insufficiency)   -Creatinine stable at 1.9   -Continue to monitor    PN 5/24-Discharge summary  PN 5/28    Kidney (Renal) Failure/Injury      Nephrotoxic Agents     x BUN/Creatinine GFR   31 (H) 36 (H) 40 (H) 45 (H) 43 (H) 45 (H) 36 (H) 41 (H) 31 (H) 29 (H) 24 (H)   2.0 (H) 1.8 (H) 1.9 (H) 2.1 (H) 1.9 (H) 1.9 (H) 2.0 (H) 2.0 (H) 1.6 (H) 1.9 (H) 1.7 (H)   35 (A) 40 (A) 37 (A) 33 (A) 37 (A) 37 (A) 35 (A) 35 (A) 46 (A) 37 (A) 43 (A)    Lab 5/20-5/29    Urine: Casts         Eosinophils      Dehydration      Nausea/Vomiting      Dialysis/CRRT      Treatment:     x Other:  CATHETERIZATION, HEART, BOTH LEFT AND RIGHT (N/A)    Op note 5/26     Acute Kidney Injury / Acute Renal Failure has different defining criteria. A generally accepted guideline is:   A greater than 100% (2X) rise in serum creatinine from baseline* occurring during the course of a single hospital stay.   *Baseline as determined by the providers judgment and consideration of previous lab values and other documentation, if available.    A diagnosis of Acute Kidney Injury/Acute Renal Failure should incorporate abnormal labs and clinical findings that are clinically significant      References: 1. April et al. Acute renal failure-definition, outcome measures, animal models, fluid therapy and information technology needs: the Second International Consensus  Conference of the Acute Dialysis Quality Initiative (ADQI) Group. Crit Care 2004; 8:B204; 2. Hernandez et al. Acute Kidney Injury Network: report of an initiative to improve outcomes in acute kidney injury. Crit Care 2007; 11:R31; 3. Kidney Disease: Improving Global Outcomes (KDIGO). Acute Kidney Injury Work Group. KDIGO clinical practice guidelines for acute kidney injury. Kidney Int Suppl 2012; 2:1.    The clinical guidelines noted above are only a system guideline. It does not replace the providers clinical judgment.     Please clarify the chronic renal insufficiency. Check all that apply. Thank you.    [    ] Unspecified Acute Kidney Failure/Injury     [    ] Chronic Kidney Disease (CKD) stage 2 - Mildly reduced kidney function eGFR 60-89    [x    ] Chronic Kidney Disease (CKD) stage 3 - Moderately reduced kidney function eGFR 30-59   [    ] Other (please specify): _______________________________   [  ] Clinically Undetermined       Present on admission (POA) status:   [   ] Yes (Y)              [   ] Clinically Undetermined (W)             [   ] No (N)               [   ] Documentation insufficient to determine if condition is POA (U)     Please document in your progress notes daily for the duration of treatment until resolved and include in your discharge summary.

## 2020-06-02 ENCOUNTER — CLINICAL SUPPORT (OUTPATIENT)
Dept: CARDIOLOGY | Facility: CLINIC | Age: 60
End: 2020-06-02
Payer: MEDICAID

## 2020-06-02 ENCOUNTER — TELEPHONE (OUTPATIENT)
Dept: CARDIOLOGY | Facility: CLINIC | Age: 60
End: 2020-06-02

## 2020-06-02 NOTE — PROGRESS NOTES
48 hour CHF hospital discharge f/u call:      Called and spoke with pt to do symptom check. Pt informed feels better but still weak and unable to stand or walk at this time. Inquired if pt would like HH ordered for PT. Pt sister, Lilian informed not at this time and that she is doing some therapy exercises with pt. Informed Lilian and pt to notify us if change their mind at any time about PT consult. Pt denies CP, sob, PND, orthopnea, or swelling. Inquired about new medications from hospital discharge. Pt requested I review medications with his sister, Lilian. Lilian informed someone from the hospital called and reviewed the medications yesterday and that pt is taking all medications as ordered. Pt appt was re-scheduled for next week per pt due to weakness. Inquired if pt would like to do virtual appt and pt agreed. Virtual appt scheduled for 6-3-20. Pt with c/o problems sleeping at night. Informed pt that I would notify provider and will advise at virtual appt tomorrow. Provided pt/Lilian with office number for any concerns or signs/symptoms listed below.        CHF education with pt in detail  Recommend 2 gram sodium restriction and 1500cc fluid restriction.  Encourage physical activity with graded exercise program.  Requested patient to weigh themselves daily, and to notify us if their weight increases by more than 2 lbs in 1 day or 5 lbs in 1 week.      Watch for these Signs and Symptoms  If any of these occur, contact your doctor immediately:   Increase in shortness of breath with movement   Increase in swelling in your legs and ankles   Weight gain of more than 2 pounds in a night or 5 pounds in a week   Difficulty breathing when you are lying down   Worsening fatigue or tiredness   Stomach bloating, a full feeling or a loss of appetite   Increased coughing--especially when you are lying down    Call 911 if any of the followin: Worsening chest tightness or chest pain  2: Cough with pink, frothy  sputum    Pt and Lilian, sister verbalized understanding of above information and all questions answered.

## 2020-06-02 NOTE — TELEPHONE ENCOUNTER
----- Message from Anaya Santos sent at 6/2/2020 10:19 AM CDT -----  Contact: sister, mario egan  Needs to reschedule appt set for tomorrow. Please call Mario at [phone]

## 2020-06-03 ENCOUNTER — OFFICE VISIT (OUTPATIENT)
Dept: CARDIOLOGY | Facility: CLINIC | Age: 60
End: 2020-06-03
Payer: MEDICAID

## 2020-06-03 DIAGNOSIS — I21.4 NSTEMI (NON-ST ELEVATED MYOCARDIAL INFARCTION): ICD-10-CM

## 2020-06-03 DIAGNOSIS — I25.5 ISCHEMIC CARDIOMYOPATHY: Chronic | ICD-10-CM

## 2020-06-03 DIAGNOSIS — I50.41 ACUTE COMBINED SYSTOLIC AND DIASTOLIC CHF, NYHA CLASS 4: Primary | ICD-10-CM

## 2020-06-03 DIAGNOSIS — I25.10 CORONARY ARTERY DISEASE, OCCLUSIVE: Chronic | ICD-10-CM

## 2020-06-03 DIAGNOSIS — I10 ESSENTIAL HYPERTENSION: ICD-10-CM

## 2020-06-03 PROCEDURE — 99213 OFFICE O/P EST LOW 20 MIN: CPT | Mod: 95,,, | Performed by: NUCLEAR MEDICINE

## 2020-06-03 PROCEDURE — 99213 PR OFFICE/OUTPT VISIT, EST, LEVL III, 20-29 MIN: ICD-10-PCS | Mod: 95,,, | Performed by: NUCLEAR MEDICINE

## 2020-06-04 ENCOUNTER — TELEPHONE (OUTPATIENT)
Dept: CARDIOLOGY | Facility: CLINIC | Age: 60
End: 2020-06-04

## 2020-06-04 NOTE — TELEPHONE ENCOUNTER
Orders received yesterday for Home health sent to [facility]. Farzana with [facility] informed pt insurance is not accepted. Checked with Leonor with hospital case management to see what  agencies might accept pt insurance. Leonor informed only agency that might accept is [facility]. Spoke with Cesar at [facility] who informed to send referral and they would let us know if approved. Faxed referral/orders to [facility] and awaiting approval.

## 2020-06-04 NOTE — PROGRESS NOTES
The patient location is: HOME  The chief complaint leading to consultation is: HFrEF,  HOSPITAL FOLLOW UP    Visit type: audiovisual    Face to Face time with patient: 20 MINUTES  30 minutes of total time spent on the encounter, which includes face to face time and non-face to face time preparing to see the patient (eg, review of tests), Obtaining and/or reviewing separately obtained history, Documenting clinical information in the electronic or other health record, Independently interpreting results (not separately reported) and communicating results to the patient/family/caregiver, or Care coordination (not separately reported).         Each patient to whom he or she provides medical services by telemedicine is:  (1) informed of the relationship between the physician and patient and the respective role of any other health care provider with respect to management of the patient; and (2) notified that he or she may decline to receive medical services by telemedicine and may withdraw from such care at any time.    Notes:  CARD PROBLEMS-  HFrEF, STAGE C, FC 3B,  ISCHEMIC CMP  RECENT[place] [facility]  [place] ADHF ON CHRONIC HF  STILL FEELING WEAK AND UNSTEADY.  WINSTON IMPROVING, NO PND  EDEMA IMPROVED- GOOD DIURESIS  NO PALPITATIONS  NO CHEST DISCOMFORT- ANGINA OR EQUIVALENT  NO FOCAL CNS SYMPTOMS OR SIGNS TO SUGGEST TIA OR STROKE  NO FEVER OR CHILLS  CARD MED- NO SIDE EFFECT  UNABLE TO TAKE HIS WEIGHT OR BP.  NO HAVE EQUIPMENT    PLAN- REFER AND SCHEDULE HOME HEALTH VISIT  TO  CHECK AND EDUCATE FOR MEDICINE COMPLIANCE  AND CHECK VITAL SIGNS AND WEIGHT  EVALUATION FOR HOME PHYSICAL RX    FURTHER RECOMMENDATIONS AFTER CONTACT WITH HH SERVICE

## 2020-06-08 ENCOUNTER — NURSE TRIAGE (OUTPATIENT)
Dept: ADMINISTRATIVE | Facility: CLINIC | Age: 60
End: 2020-06-08

## 2020-06-08 ENCOUNTER — DOCUMENTATION ONLY (OUTPATIENT)
Dept: CARDIOLOGY | Facility: CLINIC | Age: 60
End: 2020-06-08

## 2020-06-08 ENCOUNTER — TELEPHONE (OUTPATIENT)
Dept: CARDIOLOGY | Facility: CLINIC | Age: 60
End: 2020-06-08

## 2020-06-08 NOTE — TELEPHONE ENCOUNTER
----- Message from Jeanne Longoria RN sent at 6/8/2020  9:22 AM CDT -----  Regarding: FW: Sx check      ----- Message -----  From: Josefina Tolentino  Sent: 6/8/2020   8:05 AM CDT  To: Jeanne Longoria RN  Subject: RE: Sx check                                     Ok, he is only scheduled for labs. Do you want me to cancel his lab appt?   ----- Message -----  From: Jeanne Longoria RN  Sent: 6/8/2020  To: [employer] Card Proc Clinical Staff  Subject: Sx check                                         Symptom check weekly starting today. Pt unable to come in at this time for appts at this time.

## 2020-06-08 NOTE — PROGRESS NOTES
[facility] - Cardiology  Medical Specialty       Patient Name: Ceasar Hernandez  MRN: [id number]  Primary Care Physician: Primary Doctor No  Reason for referral: home health/community resources    Attempted to find home health agency to accept patient. [facility], stated they would accept but when they sent to [facility] they would not authorize any visits. Attempted to speak to someone at [facility] about this, but was not able to get any information why visits were not being authorized. I was told they have a Heart Failure program, however they were not able to provide any details about what the program entails. Patient can call to Rapid Response dept at [facility] ([phone]) to sign up.     Plan: Attempted to contact patient to let him know this and assess any other needs. No answer/no voicemail set up. I will try to get in touch with patient again later today/tomorrow.      Michelle Dias, MSW, [facility]  [phone]

## 2020-06-08 NOTE — TELEPHONE ENCOUNTER
Pt check in provided by cardiology clinic earlier today.  No contact needed per post procedure protocol.      Additional Information   Negative: Caller has already spoken with the PCP (or office), and has no further questions   Negative: Caller has already spoken with another triager and has no further questions   Negative: Caller has already spoken with another triager or PCP (or office), and has further questions and triager able to answer questions.   Negative: Busy signal.  First attempt to contact caller.  Follow-up call scheduled within 15 minutes.   Negative: No answer.  First attempt to contact caller.  Follow-up call scheduled within 15 minutes.   Negative: Message left on identified voicemail   Negative: Message left on unidentified voice mail. Phone number verified.   Negative: Message left with person in household   Negative: Wrong number reached. Phone number verified.   Negative: Second attempt to contact family AND no contact made. Phone number verified.   Negative: Cell phone out of range. Phone number verified.   Negative: Patient already left for the hospital/clinic   Negative: Caller has cancelled the call before the first contact   Negative: Unable to complete triage due to phone connection issues    Protocols used: NO CONTACT OR DUPLICATE CONTACT CALL-A-[place]

## 2020-06-08 NOTE — TELEPHONE ENCOUNTER
Called to do symptom check with patient.     Pt informed feeling better and doing exercises his sister, Lilian helps him with. Pt informed able to stand up now to get in w/c but not able to walk yet. Pt denies CP, WINSTON, PND, orthopnea, or edema. Pt informed is having some numbness to his left foot, no swelling and that someone told him in the hospital that he has 2 clots in his foot. Arterial lower extremity ultrasound done during recent hospital stay. Informed Lilian and pt unable to get HH started due to insurance not accepted. Inquired if pt is weighing himself daily and checking vital signs. Pt not weighing self daily but Lilian,sister informed will start and keep a daily record. Lilian also informed does not have a BP machine but will try to get one and keep a daily record. Reviewed medications with Justin Quintana, and she informed taking all medications as prescribed. Informed Lilian and pt need to come in for f/u appt and lab work ordered for today. Inquired if could schedule an appt this week since pt now able to get up in w/c. Lilian informed she did not want to make an appt right now d/t still too difficult to move pt around. Informed Lilian to call for f/u appt as soon as pt able to come in. Lilian informed pt has been wearing Lifevest since discharged from the hospital.      CHF education with pt in detail  Recommend 2 gram sodium restriction and 1500cc fluid restriction.  Encourage physical activity with graded exercise program.  Requested patient to weigh themselves daily, and to notify us if their weight increases by more than 2 lbs in 1 day or 5 lbs in 1 week.      Watch for these Signs and Symptoms  If any of these occur, contact your doctor immediately:   Increase in shortness of breath with movement   Increase in swelling in your legs and ankles   Weight gain of more than 2 pounds in a night or 5 pounds in a week   Difficulty breathing when you are lying down   Worsening fatigue or  tiredness   Stomach bloating, a full feeling or a loss of appetite   Increased coughing--especially when you are lying down    Call 911 if any of the followin: Worsening chest tightness or chest pain  2: Cough with pink, frothy sputum      Lilian,sister and pt verbalized understanding of above information and all questions answered.       Please advise.

## 2020-06-09 ENCOUNTER — TELEPHONE (OUTPATIENT)
Dept: CARDIOLOGY | Facility: CLINIC | Age: 60
End: 2020-06-09

## 2020-06-09 NOTE — TELEPHONE ENCOUNTER
[facility] - Cardiology  Medical Specialty       Patient Name: Ceasar Hernandez  MRN: [id number]  Primary Care Physician: Primary Doctor No    Spoke to patient's sister, Lilian, who was sitting next to him. Patient reports his phone had been off, so he did not see my call yesterday. Explained difficulties finding home health agency and provided information on the Heart Failure program through his insurance. Lilian states she will call to get him signed up for the program. Liilan also reports patient is doing well today. She assists him with getting up/down, but she states he seems to be making some progress. They deny any other needs at this time.  Encouraged Lilian/patient to call me if any needs/questions arise.    Michelle Dias, MSW, [facility]  [phone]

## 2020-06-10 ENCOUNTER — TELEPHONE (OUTPATIENT)
Dept: CARDIOLOGY | Facility: CLINIC | Age: 60
End: 2020-06-10

## 2020-06-10 NOTE — TELEPHONE ENCOUNTER
----- Message from Jeanne Longoria RN sent at 6/8/2020  3:19 PM CDT -----  Sx check and try to schedule f/u appt. Needs close f/u, check on pt every other day.

## 2020-06-12 NOTE — TELEPHONE ENCOUNTER
----- Message from Lilian Phillips LPN sent at 6/10/2020 10:08 AM CDT -----  Sx check   Schedule f/u appt

## 2021-01-13 ENCOUNTER — TELEPHONE (OUTPATIENT)
Dept: CARDIOLOGY | Facility: CLINIC | Age: 61
End: 2021-01-13

## 2021-01-15 ENCOUNTER — TELEPHONE (OUTPATIENT)
Dept: CARDIOLOGY | Facility: CLINIC | Age: 61
End: 2021-01-15

## 2021-01-22 DIAGNOSIS — I50.41 ACUTE COMBINED SYSTOLIC AND DIASTOLIC CHF, NYHA CLASS 4: Primary | ICD-10-CM

## 2021-02-01 ENCOUNTER — TELEPHONE (OUTPATIENT)
Dept: TRANSPLANT | Facility: CLINIC | Age: 61
End: 2021-02-01

## 2022-10-19 NOTE — SIGNIFICANT EVENT
Patient re evaluated at bedside     Troponin peaked at 2.5.  Off BIPAP     Tachycardia and hypertension.    Started on COREG, LISINOPRIL, PLAVIX       Discussed with cardiology. Ok to transfer to telemetry.   Probably on too much medication. Will check tsh and go from there

## 2022-12-16 ENCOUNTER — PATIENT MESSAGE (OUTPATIENT)
Dept: RESEARCH | Facility: HOSPITAL | Age: 62
End: 2022-12-16
Payer: MEDICAID

## (undated) DEVICE — KIT SYR REUSABLE

## (undated) DEVICE — CATH SWAN GANZ STND 7FR

## (undated) DEVICE — CONTRAST OMNIPAQUE 240 150ML

## (undated) DEVICE — SEE MEDLINE ITEM 157187

## (undated) DEVICE — PACK CATH LAB CUSTOM BR

## (undated) DEVICE — GUIDEWIRE WHOLEY HI TORQ 175CM

## (undated) DEVICE — KIT MANIFOLD LOW PRESS TUBING

## (undated) DEVICE — GUIDEWIRE EMERALD .035IN 260CM

## (undated) DEVICE — KIT GLIDESHEATH SLEND 6FR 10CM

## (undated) DEVICE — CATH JR4 5FR

## (undated) DEVICE — CATH INFINITI MULTIPAK JR4 5FR

## (undated) DEVICE — CATH JL4 5FR

## (undated) DEVICE — CATH PIGTAIL 5FX110CM

## (undated) DEVICE — ANGIOTOUCH KIT

## (undated) DEVICE — BAND TR COMP DEVICE REG 24CM